# Patient Record
Sex: MALE | Race: WHITE | NOT HISPANIC OR LATINO | Employment: FULL TIME | URBAN - METROPOLITAN AREA
[De-identification: names, ages, dates, MRNs, and addresses within clinical notes are randomized per-mention and may not be internally consistent; named-entity substitution may affect disease eponyms.]

---

## 2017-01-03 ENCOUNTER — ALLSCRIPTS OFFICE VISIT (OUTPATIENT)
Dept: OTHER | Facility: OTHER | Age: 47
End: 2017-01-03

## 2017-03-07 ENCOUNTER — ALLSCRIPTS OFFICE VISIT (OUTPATIENT)
Dept: OTHER | Facility: OTHER | Age: 47
End: 2017-03-07

## 2017-03-07 DIAGNOSIS — E29.1 TESTICULAR HYPOFUNCTION: ICD-10-CM

## 2017-03-07 DIAGNOSIS — R68.82 DECREASED LIBIDO: ICD-10-CM

## 2017-03-27 ENCOUNTER — TRANSCRIBE ORDERS (OUTPATIENT)
Dept: ADMINISTRATIVE | Facility: HOSPITAL | Age: 47
End: 2017-03-27

## 2017-03-27 DIAGNOSIS — R68.82 DECREASED LIBIDO: Primary | ICD-10-CM

## 2017-04-03 DIAGNOSIS — E29.1 TESTICULAR HYPOFUNCTION: ICD-10-CM

## 2017-04-05 ENCOUNTER — TRANSCRIBE ORDERS (OUTPATIENT)
Dept: ADMINISTRATIVE | Facility: HOSPITAL | Age: 47
End: 2017-04-05

## 2017-04-05 DIAGNOSIS — G47.33 OBSTRUCTIVE SLEEP APNEA (ADULT) (PEDIATRIC): Primary | ICD-10-CM

## 2017-04-24 ENCOUNTER — LAB CONVERSION - ENCOUNTER (OUTPATIENT)
Dept: OTHER | Facility: OTHER | Age: 47
End: 2017-04-24

## 2017-04-24 LAB
25(OH)D3 SERPL-MCNC: 42 NG/ML (ref 30–100)
FERRITIN SERPL-MCNC: 266 NG/ML (ref 20–380)
PROLACTIN (HISTORICAL): 4.1 NG/ML (ref 2–18)

## 2017-04-25 ENCOUNTER — HOSPITAL ENCOUNTER (OUTPATIENT)
Dept: SLEEP CENTER | Facility: CLINIC | Age: 47
Discharge: HOME | End: 2017-04-25
Payer: COMMERCIAL

## 2017-04-25 DIAGNOSIS — G47.33 OBSTRUCTIVE SLEEP APNEA (ADULT) (PEDIATRIC): ICD-10-CM

## 2017-04-25 PROCEDURE — 95810 POLYSOM 6/> YRS 4/> PARAM: CPT

## 2017-05-01 ENCOUNTER — GENERIC CONVERSION - ENCOUNTER (OUTPATIENT)
Dept: OTHER | Facility: OTHER | Age: 47
End: 2017-05-01

## 2017-06-02 ENCOUNTER — ALLSCRIPTS OFFICE VISIT (OUTPATIENT)
Dept: OTHER | Facility: OTHER | Age: 47
End: 2017-06-02

## 2017-06-02 DIAGNOSIS — R73.01 IMPAIRED FASTING GLUCOSE: ICD-10-CM

## 2017-06-02 DIAGNOSIS — E04.1 NONTOXIC SINGLE THYROID NODULE: ICD-10-CM

## 2017-06-04 ENCOUNTER — HOSPITAL ENCOUNTER (OUTPATIENT)
Dept: ULTRASOUND IMAGING | Facility: HOSPITAL | Age: 47
Discharge: HOME | End: 2017-06-04
Attending: INTERNAL MEDICINE
Payer: COMMERCIAL

## 2017-06-04 DIAGNOSIS — E04.1 NONTOXIC SINGLE THYROID NODULE: ICD-10-CM

## 2017-06-04 PROCEDURE — 76536 US EXAM OF HEAD AND NECK: CPT

## 2017-06-06 ENCOUNTER — GENERIC CONVERSION - ENCOUNTER (OUTPATIENT)
Dept: OTHER | Facility: OTHER | Age: 47
End: 2017-06-06

## 2017-06-08 ENCOUNTER — TRANSCRIBE ORDERS (OUTPATIENT)
Dept: ADMINISTRATIVE | Facility: HOSPITAL | Age: 47
End: 2017-06-08

## 2017-06-08 DIAGNOSIS — E04.1 NONTOXIC UNINODULAR GOITER: Primary | ICD-10-CM

## 2017-06-17 ENCOUNTER — LAB CONVERSION - ENCOUNTER (OUTPATIENT)
Dept: OTHER | Facility: OTHER | Age: 47
End: 2017-06-17

## 2017-06-17 LAB
A/G RATIO (HISTORICAL): 2.3 (CALC) (ref 1–2.5)
ALBUMIN SERPL BCP-MCNC: 4.3 G/DL (ref 3.6–5.1)
ALP SERPL-CCNC: 34 U/L (ref 40–115)
ALT SERPL W P-5'-P-CCNC: 16 U/L (ref 9–46)
AST SERPL W P-5'-P-CCNC: 12 U/L (ref 10–40)
BILIRUB SERPL-MCNC: 0.7 MG/DL (ref 0.2–1.2)
BUN SERPL-MCNC: 14 MG/DL (ref 7–25)
BUN/CREA RATIO (HISTORICAL): ABNORMAL (CALC) (ref 6–22)
CALCIUM SERPL-MCNC: 9.3 MG/DL (ref 8.6–10.3)
CHLORIDE SERPL-SCNC: 107 MMOL/L (ref 98–110)
CO2 SERPL-SCNC: 28 MMOL/L (ref 20–31)
CREAT SERPL-MCNC: 0.86 MG/DL (ref 0.6–1.35)
EGFR AFRICAN AMERICAN (HISTORICAL): 121 ML/MIN/1.73M2
EGFR-AMERICAN CALC (HISTORICAL): 104 ML/MIN/1.73M2
GAMMA GLOBULIN (HISTORICAL): 1.9 G/DL (CALC) (ref 1.9–3.7)
GLUCOSE (HISTORICAL): 94 MG/DL (ref 65–99)
HBA1C MFR BLD HPLC: 4.9 % OF TOTAL HGB
POTASSIUM SERPL-SCNC: 4.4 MMOL/L (ref 3.5–5.3)
SODIUM SERPL-SCNC: 141 MMOL/L (ref 135–146)
T4 FREE SERPL-MCNC: 1.3 NG/DL (ref 0.8–1.8)
TOTAL PROTEIN (HISTORICAL): 6.2 G/DL (ref 6.1–8.1)
TSH SERPL DL<=0.05 MIU/L-ACNC: 1.35 MIU/L (ref 0.4–4.5)

## 2017-06-19 ENCOUNTER — HOSPITAL ENCOUNTER (OUTPATIENT)
Dept: RADIOLOGY | Facility: HOSPITAL | Age: 47
Discharge: HOME | End: 2017-06-19
Attending: INTERNAL MEDICINE
Payer: COMMERCIAL

## 2017-06-19 DIAGNOSIS — E04.1 NONTOXIC UNINODULAR GOITER: ICD-10-CM

## 2017-06-19 PROCEDURE — 88173 CYTOPATH EVAL FNA REPORT: CPT | Performed by: INTERNAL MEDICINE

## 2017-06-19 PROCEDURE — 88172 CYTP DX EVAL FNA 1ST EA SITE: CPT | Performed by: INTERNAL MEDICINE

## 2017-06-19 PROCEDURE — 10022 HB FNA W/IMAGE: CPT

## 2017-06-19 PROCEDURE — 76942 ECHO GUIDE FOR BIOPSY: CPT

## 2017-06-19 RX ORDER — LIDOCAINE HYDROCHLORIDE 10 MG/ML
4 INJECTION, SOLUTION INFILTRATION; PERINEURAL ONCE
Status: COMPLETED | OUTPATIENT
Start: 2017-06-19 | End: 2017-06-19

## 2017-06-19 RX ADMIN — LIDOCAINE HYDROCHLORIDE 4 ML: 10 INJECTION, SOLUTION INFILTRATION; PERINEURAL at 09:24

## 2017-06-20 ENCOUNTER — GENERIC CONVERSION - ENCOUNTER (OUTPATIENT)
Dept: OTHER | Facility: OTHER | Age: 47
End: 2017-06-20

## 2017-06-27 ENCOUNTER — ALLSCRIPTS OFFICE VISIT (OUTPATIENT)
Dept: OTHER | Facility: OTHER | Age: 47
End: 2017-06-27

## 2017-07-14 ENCOUNTER — ALLSCRIPTS OFFICE VISIT (OUTPATIENT)
Dept: OTHER | Facility: OTHER | Age: 47
End: 2017-07-14

## 2017-07-17 ENCOUNTER — GENERIC CONVERSION - ENCOUNTER (OUTPATIENT)
Dept: OTHER | Facility: OTHER | Age: 47
End: 2017-07-17

## 2017-07-31 DIAGNOSIS — E29.1 TESTICULAR HYPOFUNCTION: ICD-10-CM

## 2017-08-01 ENCOUNTER — GENERIC CONVERSION - ENCOUNTER (OUTPATIENT)
Dept: OTHER | Facility: OTHER | Age: 47
End: 2017-08-01

## 2017-08-02 ENCOUNTER — LAB CONVERSION - ENCOUNTER (OUTPATIENT)
Dept: OTHER | Facility: OTHER | Age: 47
End: 2017-08-02

## 2017-08-02 LAB
TESTOSTERONE FREE (HISTORICAL): 98.4 PG/ML (ref 35–155)
TESTOSTERONE TOTAL (HISTORICAL): 734 NG/DL (ref 250–1100)

## 2017-08-03 ENCOUNTER — ALLSCRIPTS OFFICE VISIT (OUTPATIENT)
Dept: OTHER | Facility: OTHER | Age: 47
End: 2017-08-03

## 2017-08-06 ENCOUNTER — GENERIC CONVERSION - ENCOUNTER (OUTPATIENT)
Dept: OTHER | Facility: OTHER | Age: 47
End: 2017-08-06

## 2017-08-31 ENCOUNTER — GENERIC CONVERSION - ENCOUNTER (OUTPATIENT)
Dept: OTHER | Facility: OTHER | Age: 47
End: 2017-08-31

## 2017-09-12 ENCOUNTER — GENERIC CONVERSION - ENCOUNTER (OUTPATIENT)
Dept: OTHER | Facility: OTHER | Age: 47
End: 2017-09-12

## 2017-10-12 ENCOUNTER — GENERIC CONVERSION - ENCOUNTER (OUTPATIENT)
Dept: OTHER | Facility: OTHER | Age: 47
End: 2017-10-12

## 2017-11-28 ENCOUNTER — GENERIC CONVERSION - ENCOUNTER (OUTPATIENT)
Dept: FAMILY MEDICINE CLINIC | Facility: CLINIC | Age: 47
End: 2017-11-28

## 2017-12-05 ENCOUNTER — GENERIC CONVERSION - ENCOUNTER (OUTPATIENT)
Dept: FAMILY MEDICINE CLINIC | Facility: CLINIC | Age: 47
End: 2017-12-05

## 2017-12-22 ENCOUNTER — GENERIC CONVERSION - ENCOUNTER (OUTPATIENT)
Dept: OTHER | Facility: OTHER | Age: 47
End: 2017-12-22

## 2017-12-22 ENCOUNTER — GENERIC CONVERSION - ENCOUNTER (OUTPATIENT)
Dept: ENDOCRINOLOGY | Facility: CLINIC | Age: 47
End: 2017-12-22

## 2018-01-02 ENCOUNTER — GENERIC CONVERSION - ENCOUNTER (OUTPATIENT)
Dept: FAMILY MEDICINE CLINIC | Facility: CLINIC | Age: 48
End: 2018-01-02

## 2018-01-10 NOTE — RESULT NOTES
Discussion/Summary   Needs ultrasound-guided fine needle aspirate of the right thyroid nodule  Verified Results  US THYROID [id number] 02:42PM Bharti Fulton Order Number: [id number]    - Patient Instructions: To schedule this appointment, please contact Central Scheduling at [phone]  Test Name Result Flag Reference   US THYROID (Report)     THYROID ULTRASOUND     INDICATION: Palpable nodule     COMPARISON: None  TECHNIQUE:  Ultrasound of the thyroid was performed with a high frequency linear transducer in transverse and sagittal planes including volumetric imaging sweeps as well as traditional still imaging technique  FINDINGS:   Normal homogeneous smooth echotexture  Right gland: 1 8 x 2 0 x 4 7 cm  Right lower pole nodule measuring 2 0 x 2 2 x 2 5 cm  Solid hypoechoic with microcalcification and infiltrative margins with extension to the capsule  No priors for comparison  HIGH SUSPICION PATTERN (estimated risk of malignancy > 70-90%) FNA    recommended at >/= 1 0 cm  Left gland: 1 1 x 1 3 x 3 7 cm  No dominant nodules  Isthmus: The isthmus is 0 3 cm in AP dimension  IMPRESSION:      Right thyroid nodule, highly suspicious for malignancy  Sampling recommended       ##cfslh   I personally discussed this result with Dylan Cox on 6/6/2017 10:03 AM    ##               Workstation performed: [id number]     Signed by:   Devin Tubbs MD   6/6/17

## 2018-01-12 VITALS
HEART RATE: 66 BPM | DIASTOLIC BLOOD PRESSURE: 80 MMHG | WEIGHT: 204 LBS | SYSTOLIC BLOOD PRESSURE: 120 MMHG | HEIGHT: 67 IN | BODY MASS INDEX: 32.02 KG/M2

## 2018-01-13 VITALS
HEIGHT: 67 IN | SYSTOLIC BLOOD PRESSURE: 118 MMHG | HEART RATE: 92 BPM | BODY MASS INDEX: 31.58 KG/M2 | DIASTOLIC BLOOD PRESSURE: 84 MMHG | WEIGHT: 201.19 LBS

## 2018-01-13 VITALS
HEIGHT: 67 IN | BODY MASS INDEX: 31.57 KG/M2 | WEIGHT: 201.13 LBS | DIASTOLIC BLOOD PRESSURE: 90 MMHG | HEART RATE: 84 BPM | SYSTOLIC BLOOD PRESSURE: 130 MMHG

## 2018-01-13 VITALS
DIASTOLIC BLOOD PRESSURE: 86 MMHG | WEIGHT: 203.13 LBS | HEART RATE: 76 BPM | BODY MASS INDEX: 31.81 KG/M2 | SYSTOLIC BLOOD PRESSURE: 140 MMHG

## 2018-01-14 VITALS
WEIGHT: 196.5 LBS | SYSTOLIC BLOOD PRESSURE: 132 MMHG | BODY MASS INDEX: 30.84 KG/M2 | HEART RATE: 84 BPM | HEIGHT: 67 IN | DIASTOLIC BLOOD PRESSURE: 82 MMHG

## 2018-01-15 VITALS
HEIGHT: 67 IN | WEIGHT: 200 LBS | TEMPERATURE: 97.7 F | BODY MASS INDEX: 31.39 KG/M2 | RESPIRATION RATE: 16 BRPM | SYSTOLIC BLOOD PRESSURE: 136 MMHG | DIASTOLIC BLOOD PRESSURE: 84 MMHG | OXYGEN SATURATION: 98 % | HEART RATE: 96 BPM

## 2018-01-16 NOTE — RESULT NOTES
Discussion/Summary   Normal laboratory testing     Verified Results  US GUIDED THYROID BIOPSY [id number] 08:59AM Sherron Villa     Test Name Result Flag Reference   US GUIDED THYROID BIOPSY (Report)     ULTRASOUND-GUIDED THYROID BIOPSY     HISTORY: 56year-old male with a history of a right-sided thyroid nodule  COMPARISON: Thyroid ultrasound from June 4, 2017  FINDINGS:      Prior ultrasound images were reviewed  On prior thyroid ultrasound from June 4, 2017, there was a right lower pole thyroid nodule measuring 2 0 x 2 2 x 2 5 cm that met criteria for FNA  On today's limited thyroid ultrasound, the right lower pole thyroid nodule measured 2 5 x 2 0 x 2 1 cm  The procedure was explained to the patient, including risks of hemorrhage, infection and local injury  Informed consent was freely obtained  The patient verbalized expressed understanding of the above risks and wished to proceed with the procedure  Final standard time-out procedure performed  PROCEDURE: The neck was prepped and draped in normal sterile fashion  Under real-time ultrasound guidance and local anesthesia 3 passes with a 25 gauge needle were made through the right lower pole thyroid nodule  Cytopathology was present and deemed    the specimens adequate for evaluation  The patient tolerated the procedure well  There was no evidence of post biopsy hematoma formation on repeat ultrasound evaluation  Postprocedure instructions were provided for the patient  I asked the patient to    call us with any questions, concerns, or acute problems  The patient expressed understanding of the above  IMPRESSION:     Status post successful ultrasound-guided thyroid biopsy of the right lower pole thyroid nodule previously measuring 2 0 x 2 2 x 2 5 cm  There was no evidence of post biopsy hematoma on repeat ultrasound evaluation  I reviewed the above findings and procedure with Dr Pia Santillan        Procedure was performed by Nan Wilder [place] PA-C under the direct supervision of Dr Yenni Jack  PERFORMED, DICTATED AND SIGNED BY: Thalia Feng       Workstation performed: [id number]     Signed by:   Laura Cordova MD   6/19/17     (1) COMPREHENSIVE METABOLIC PANEL [id number] 14:27ZO Percell Citrin     Test Name Result Flag Reference   GLUCOSE 94 mg/dL  65-99   Fasting reference interval   UREA NITROGEN (BUN) 14 mg/dL  7-25   CREATININE 0 86 mg/dL  0 60-1 35   eGFR NON-AFR  AMERICAN 104 mL/min/1 73m2  > OR = 60   eGFR AFRICAN AMERICAN 121 mL/min/1 73m2  > OR = 60   BUN/CREATININE RATIO   3-17   NOT APPLICABLE (calc)   SODIUM 141 mmol/L  135-146   POTASSIUM 4 4 mmol/L  3 5-5 3   CHLORIDE 107 mmol/L     CARBON DIOXIDE 28 mmol/L  20-31   CALCIUM 9 3 mg/dL  8 6-10 3   PROTEIN, TOTAL 6 2 g/dL  6 1-8 1   ALBUMIN 4 3 g/dL  3 6-5 1   GLOBULIN 1 9 g/dL (calc)  1 9-3 7   ALBUMIN/GLOBULIN RATIO 2 3 (calc)  1 0-2 5   BILIRUBIN, TOTAL 0 7 mg/dL  0 2-1 2   ALKALINE PHOSPHATASE 34 U/L L    AST 12 U/L  10-40   ALT 16 U/L  9-46     (1) T4, FREE [id number] 06:32AM Daisy, Bankim     Test Name Result Flag Reference   T4, FREE 1 3 ng/dL  0 8-1 8     (Q) TSH, 3RD GENERATION [id number] 06:32AM Daisy, Bankim     Test Name Result Flag Reference   TSH 1 35 mIU/L  0 40-4 50     (Q) HEMOGLOBIN A1c [id number] 06:32AM Daisy, Bankim   REPORT COMMENT:  FASTING:YES     Test Name Result Flag Reference   HEMOGLOBIN A1c 4 9 % of total Hgb  <5 7   For the purpose of screening for the presence of  diabetes:     <5 7%       Consistent with the absence of diabetes  5 7-6 4%    Consistent with increased risk for diabetes              (prediabetes)  > or =6 5%  Consistent with diabetes     This assay result is consistent with a decreased risk  of diabetes  Currently, no consensus exists regarding use of  hemoglobin A1c for diagnosis of diabetes in children       According to American Diabetes Association (ADA)  guidelines, hemoglobin A1c <7 0% represents optimal  control in non-pregnant diabetic patients  Different  metrics may apply to specific patient populations  Standards of Medical Care in Diabetes(ADA)  Plan  Right thyroid nodule    · Biopsy Thyroid FNA Using Ultrasound Guidance, [id number]; Status:Active;  Requested  EZS:[id number];

## 2018-01-16 NOTE — RESULT NOTES
Discussion/Summary   Normal     Verified Results  (1) OP ALEKSANDRA FERRITIN 21Apr2017 09:33AM Ivon Shearer     Test Name Result Flag Reference   FERRITIN 266 ng/mL       *(Q) VITAMIN D, 25-HYDROXY, LC/MS/MS 21Apr2017 09:33AM Sherron Villa   REPORT COMMENT:  FASTING:YES     Test Name Result Flag Reference   VITAMIN D, 25-OH, TOTAL 42 ng/mL     Vitamin D Status         25-OH Vitamin D:     Deficiency:                    <20 ng/mL  Insufficiency:             20 - 29 ng/mL  Optimal:                 > or = 30 ng/mL     For 25-OH Vitamin D testing on patients on   D2-supplementation and patients for whom quantitation   of D2 and D3 fractions is required, the QuestAssureD(TM)  25-OH VIT D, (D2,D3), LC/MS/MS is recommended: order   code 81998 (patients >2yrs)  For more information on this test, go to:  http://education  [employer]/faq/RDD601  (This link is being provided for   informational/educational purposes only )

## 2018-02-12 DIAGNOSIS — E23.0 HYPOPITUITARISM (HCC): ICD-10-CM

## 2018-02-12 DIAGNOSIS — R73.01 IMPAIRED FASTING GLUCOSE: ICD-10-CM

## 2018-02-12 DIAGNOSIS — E29.1 TESTICULAR HYPOFUNCTION: ICD-10-CM

## 2018-02-14 LAB
ALBUMIN SERPL-MCNC: 4.3 G/DL (ref 3.6–5.1)
ALBUMIN/GLOB SERPL: 2.2 (CALC) (ref 1–2.5)
ALP SERPL-CCNC: 36 U/L (ref 40–115)
ALT SERPL-CCNC: 17 U/L (ref 9–46)
AST SERPL-CCNC: 14 U/L (ref 10–40)
BILIRUB SERPL-MCNC: 0.8 MG/DL (ref 0.2–1.2)
BUN SERPL-MCNC: 16 MG/DL (ref 7–25)
BUN/CREAT SERPL: ABNORMAL (CALC) (ref 6–22)
CALCIUM SERPL-MCNC: 9.1 MG/DL (ref 8.6–10.3)
CHLORIDE SERPL-SCNC: 108 MMOL/L (ref 98–110)
CO2 SERPL-SCNC: 28 MMOL/L (ref 20–31)
CREAT SERPL-MCNC: 0.84 MG/DL (ref 0.6–1.35)
GLOBULIN SER CALC-MCNC: 2 G/DL (CALC) (ref 1.9–3.7)
GLUCOSE SERPL-MCNC: 106 MG/DL (ref 65–99)
HCT VFR BLD AUTO: 41.6 % (ref 38.5–50)
HGB BLD-MCNC: 14.4 G/DL (ref 13.2–17.1)
POTASSIUM SERPL-SCNC: 4.2 MMOL/L (ref 3.5–5.3)
PROT SERPL-MCNC: 6.3 G/DL (ref 6.1–8.1)
PSA SERPL-MCNC: 0.8 NG/ML
SL AMB EGFR AFRICAN AMERICAN: 122 ML/MIN/1.73M2
SL AMB EGFR NON AFRICAN AMERICAN: 105 ML/MIN/1.73M2
SODIUM SERPL-SCNC: 142 MMOL/L (ref 135–146)
TESTOST FREE SERPL-MCNC: 105.5 PG/ML (ref 35–155)
TESTOST SERPL-MCNC: 668 NG/DL (ref 250–1100)

## 2018-02-15 NOTE — PROGRESS NOTES
Please call the patient regarding his abnormal result  Most of the laboratory testing is normal except for slightly elevated blood sugar  Please send him a slip for hemoglobin A1c to be done with his next blood draw

## 2018-02-19 ENCOUNTER — TELEPHONE (OUTPATIENT)
Dept: ENDOCRINOLOGY | Facility: CLINIC | Age: 48
End: 2018-02-19

## 2018-02-19 DIAGNOSIS — E23.0 HYPOGONADOTROPIC HYPOGONADISM (HCC): Primary | ICD-10-CM

## 2018-02-19 NOTE — TELEPHONE ENCOUNTER
----- Message from Nataliya Lam MD sent at 2/15/2018  2:09 PM EST -----  Please call the patient regarding his abnormal result  Most of the laboratory testing is normal except for slightly elevated blood sugar  Please send him a slip for hemoglobin A1c to be done with his next blood draw

## 2018-02-21 LAB — HBA1C MFR BLD: 4.8 % OF TOTAL HGB

## 2018-02-21 RX ORDER — MULTIVIT-MIN/IRON/FOLIC ACID/K 18-600-40
1 CAPSULE ORAL DAILY
COMMUNITY
Start: 2017-03-07

## 2018-02-21 RX ORDER — GABAPENTIN 300 MG/1
CAPSULE ORAL
COMMUNITY
End: 2018-04-27

## 2018-02-21 RX ORDER — LEVOTHYROXINE SODIUM 137 UG/1
150 TABLET ORAL DAILY
COMMUNITY
End: 2018-04-27

## 2018-02-22 ENCOUNTER — TELEPHONE (OUTPATIENT)
Dept: ENDOCRINOLOGY | Facility: CLINIC | Age: 48
End: 2018-02-22

## 2018-02-22 NOTE — TELEPHONE ENCOUNTER
----- Message from Josee Almonte MD sent at 2/22/2018  2:09 PM EST -----  The laboratory testing results are normal     Has appointment tomorrow

## 2018-02-23 ENCOUNTER — OFFICE VISIT (OUTPATIENT)
Dept: ENDOCRINOLOGY | Facility: CLINIC | Age: 48
End: 2018-02-23
Payer: COMMERCIAL

## 2018-02-23 VITALS
HEART RATE: 91 BPM | HEIGHT: 67 IN | SYSTOLIC BLOOD PRESSURE: 124 MMHG | DIASTOLIC BLOOD PRESSURE: 62 MMHG | BODY MASS INDEX: 29.9 KG/M2 | WEIGHT: 190.5 LBS

## 2018-02-23 DIAGNOSIS — E89.0 POSTOPERATIVE HYPOTHYROIDISM: ICD-10-CM

## 2018-02-23 DIAGNOSIS — C73 PAPILLARY THYROID CARCINOMA (HCC): ICD-10-CM

## 2018-02-23 DIAGNOSIS — E23.0 HYPOGONADOTROPIC HYPOGONADISM (HCC): Primary | ICD-10-CM

## 2018-02-23 DIAGNOSIS — F52.21 ERECTILE DYSFUNCTION OF NON-ORGANIC ORIGIN: ICD-10-CM

## 2018-02-23 PROCEDURE — 99214 OFFICE O/P EST MOD 30 MIN: CPT | Performed by: INTERNAL MEDICINE

## 2018-02-23 NOTE — ASSESSMENT & PLAN NOTE
He continues on coma fan every other day  His testosterone levels are in the normal range  Continue current therapy  Recheck total and free testosterone and PSA prior to the next visit

## 2018-02-23 NOTE — PROGRESS NOTES
Assessment/Plan:    Hypogonadotropic hypogonadism ([facility] [address] )  He continues on coma fan every other day  His testosterone levels are in the normal range  Continue current therapy  Recheck total and free testosterone and PSA prior to the next visit  Papillary thyroid carcinoma [facility])  He is seeing physicians at [facility]  Postoperative hypothyroidism  The TSH is in the low-normal range  Management per [facility]  Erectile dysfunction of non-organic origin  This has improved with increase in testosterone  Diagnoses and all orders for this visit:    Hypogonadotropic hypogonadism ([facility] [address] )  -     Testosterone, free, total Lab Collect; Future  -     PSA Lab Collect; Future    Erectile dysfunction of non-organic origin    Papillary thyroid carcinoma (HCC)    Postoperative hypothyroidism    Other orders  -     clomiPHENE (CLOMID) 50 mg tablet; Take 0 5 tablets by mouth daily    -     Omega-3 Fatty Acids (FISH OIL) 645 MG CAPS; Take by mouth  -     gabapentin (NEURONTIN) 300 mg capsule; Take by mouth  -     levothyroxine 137 mcg tablet; Take 150 tablets by mouth daily    -     Cholecalciferol (VITAMIN D) 2000 units CAPS; Take 1 capsule by mouth daily          Subjective:      Patient ID: Lucy Gauthier is a 56 y o  male  60-year-old male with hypogonadotropic hypogonadism for which she is on clomiphene  He denies any difficulty with urination  He has papillary thyroid cancer for which she had total thyroidectomy in July of 2017  He has not had radioactive iodine therapy  His most recent thyroglobulin level was undetectable with negative thyroglobulin antibodies  For the postoperative hypothyroidism, he is on levothyroxine  He denies any symptoms of hypo or hyperthyroidism  Erectile dysfunction has improved          The following portions of the patient's history were reviewed and updated as appropriate: allergies, current medications, past family history, past medical history, past social history, past surgical history and problem list     Review of Systems   Constitutional: Negative for chills and fever  Respiratory: Negative for shortness of breath  Cardiovascular: Negative for chest pain  Gastrointestinal: Negative for constipation, diarrhea, nausea and vomiting  Endocrine: Negative for cold intolerance and heat intolerance  All other systems reviewed and are negative  Objective:      /62   Pulse 91   Ht 5' 7" (1 702 m)   Wt 86 4 kg (190 lb 8 oz)   BMI 29 84 kg/m²          Physical Exam   Constitutional: He is oriented to person, place, and time  He appears well-developed and well-nourished  No distress  HENT:   Head: Normocephalic and atraumatic  Mouth/Throat: Oropharynx is clear and moist and mucous membranes are normal  No oropharyngeal exudate  Eyes: Conjunctivae, EOM and lids are normal  Right eye exhibits no discharge  Left eye exhibits no discharge  No scleral icterus  Neck: Neck supple  The thyroid is absent  Cardiovascular: Normal rate, regular rhythm and normal heart sounds  Exam reveals no gallop and no friction rub  No murmur heard  Pulmonary/Chest: Effort normal and breath sounds normal  No respiratory distress  He has no wheezes  Abdominal: Soft  Bowel sounds are normal  He exhibits no distension  There is no tenderness  Musculoskeletal: Normal range of motion  He exhibits no edema, tenderness or deformity  Lymphadenopathy:        Head (right side): No occipital adenopathy present  Head (left side): No occipital adenopathy present  Right: No supraclavicular adenopathy present  Left: No supraclavicular adenopathy present  Neurological: He is alert and oriented to person, place, and time  No cranial nerve deficit  Coordination normal    Skin: Skin is warm and intact  No rash noted  He is not diaphoretic  No erythema  Psychiatric: He has a normal mood and affect  Vitals reviewed

## 2018-04-27 ENCOUNTER — OFFICE VISIT (OUTPATIENT)
Dept: FAMILY MEDICINE CLINIC | Facility: CLINIC | Age: 48
End: 2018-04-27
Payer: COMMERCIAL

## 2018-04-27 VITALS
BODY MASS INDEX: 27.94 KG/M2 | SYSTOLIC BLOOD PRESSURE: 124 MMHG | TEMPERATURE: 98.1 F | WEIGHT: 178 LBS | DIASTOLIC BLOOD PRESSURE: 68 MMHG | RESPIRATION RATE: 16 BRPM | HEIGHT: 67 IN

## 2018-04-27 DIAGNOSIS — E89.0 POSTOPERATIVE HYPOTHYROIDISM: Primary | ICD-10-CM

## 2018-04-27 DIAGNOSIS — E23.0 HYPOGONADOTROPIC HYPOGONADISM (HCC): ICD-10-CM

## 2018-04-27 DIAGNOSIS — R10.32 GROIN DISCOMFORT, LEFT: ICD-10-CM

## 2018-04-27 DIAGNOSIS — E78.00 HYPERCHOLESTEROLEMIA: ICD-10-CM

## 2018-04-27 DIAGNOSIS — M79.2 NEURALGIA OF LEFT INGUINAL REGION: ICD-10-CM

## 2018-04-27 PROCEDURE — 1036F TOBACCO NON-USER: CPT | Performed by: FAMILY MEDICINE

## 2018-04-27 PROCEDURE — 3008F BODY MASS INDEX DOCD: CPT | Performed by: FAMILY MEDICINE

## 2018-04-27 PROCEDURE — 99214 OFFICE O/P EST MOD 30 MIN: CPT | Performed by: FAMILY MEDICINE

## 2018-04-27 RX ORDER — LEVOTHYROXINE SODIUM 0.15 MG/1
TABLET ORAL
COMMUNITY
Start: 2018-03-06 | End: 2019-08-27

## 2018-04-27 RX ORDER — MULTIVIT WITH MINERALS/LUTEIN
1000 TABLET ORAL DAILY
COMMUNITY

## 2018-04-27 NOTE — ASSESSMENT & PLAN NOTE
Patient remains on clomiphene  Check free and total testosterone level    Will need to have conversation with endocrinologist in regards to management and presc

## 2018-04-27 NOTE — PROGRESS NOTES
Subjective:      Patient ID: Jackelin Degroot is a 53 y o  male  Patient presents primarily complaining of a fullness in numbness in the left inguinal region  Patient states that approximately 2 weeks ago he was doing sit-ups when he felt a sharp pain  He stop doing any lifting and sit-ups  He still has discomfort in the left inguinal region as well as a numbness  In the left groin that extends across his abdomen  No difficulty with bowel movements  Patient does have history of left inguinal hernia repair with mesh approximately 10 years ago on the same side  Patient also has a very extensive history of having been diagnosed with papillary carcinoma of the thyroid which was noted by endocrinologist when patient was being seen for testosterone deficiency  Patient had partial thyroidectomy at [facility] and is following up with endocrinologist at [facility]  Patient has been on clomiphene as prescribed by endocrinologist to increase his testosterone level  He would like to have testosterone levels repeated  Endocrinologist have mentioned having clomiphene refilled here at the primary care office  Patient otherwise feels well  He will be going for screening for his employer  He has lost a significant amount of weight since having surgery performed  He also had spine surgery including micro diskectomy and decompression of his right shoulder by physicians at [facility] [employer]        No past medical history on file  No family history on file  No past surgical history on file  reports that he has never smoked  He has never used smokeless tobacco  He reports that he drinks alcohol  He reports that he does not use drugs        Current Outpatient Prescriptions:     Ascorbic Acid (VITAMIN C) 1000 MG tablet, Take 1,000 mg by mouth daily, Disp: , Rfl:     Cholecalciferol (VITAMIN D) 2000 units CAPS, Take 1 capsule by mouth daily, Disp: , Rfl:    Cholecalciferol 1000 units capsule, Take by mouth, Disp: , Rfl:     clomiPHENE (CLOMID) 50 mg tablet, Take 0 5 tablets by mouth daily  , Disp: , Rfl:     levothyroxine 150 mcg tablet, , Disp: , Rfl:     Magnesium 100 MG TABS, Take by mouth, Disp: , Rfl:     Omega-3 Fatty Acids (FISH OIL) 645 MG CAPS, Take by mouth, Disp: , Rfl:     Potassium 99 MG TABS, Take by mouth, Disp: , Rfl:     The following portions of the patient's history were reviewed and updated as appropriate: allergies, current medications, past family history, past medical history, past social history, past surgical history and problem list     Review of Systems   Constitutional: Negative for activity change and unexpected weight change ( weight loss)  HENT: Negative  Respiratory: Negative  Cardiovascular: Negative  Gastrointestinal: Negative  Genitourinary:        Inguinal discomfort in the left inguinal region   Musculoskeletal: Negative  Objective:    /68   Temp 98 1 °F (36 7 °C) (Tympanic)   Resp 16   Ht 5' 7" (1 702 m)   Wt 80 7 kg (178 lb)   BMI 27 88 kg/m²      Physical Exam   Constitutional: He appears well-developed and well-nourished  Neck: Normal range of motion  Neck supple  Cardiovascular: Normal rate, regular rhythm and normal heart sounds  Abdominal: Soft  Bowel sounds are normal  There is no rebound  A hernia is present  Hernia confirmed positive in the left inguinal area ( questionable hernia)  Hernia confirmed negative in the right inguinal area  Genitourinary:       Right testis shows no mass, no swelling and no tenderness  Left testis shows no mass, no swelling and no tenderness  Lymphadenopathy:        Right: No inguinal adenopathy present  Left: No inguinal adenopathy present  No results found for this or any previous visit (from the past 1008 hour(s))  Assessment/Plan:    No problem-specific Assessment & Plan notes found for this encounter             Problem List Items Addressed This Visit     Hypogonadotropic hypogonadism [facility])      Patient remains on clomiphene  Check free and total testosterone level  Will need to have conversation with endocrinologist in regards to management and presc         Relevant Orders    Testosterone, free, total    Postoperative hypothyroidism - Primary      Management as per endocrinologist at [facility] and oncologic surgeon         Relevant Medications    levothyroxine 150 mcg tablet    Other Relevant Orders    CBC and differential    Vitamin D 25 hydroxy    Groin discomfort, left    Relevant Orders    US groin/inguinal area    Hypercholesterolemia      Patient commended on his recent weight loss  Check lipid profile         Relevant Orders    Comprehensive metabolic panel    Neuralgia of left inguinal region      Patient may have developed a hernia or even door mesh  Check left inguinal ultrasound              Follow-up will be based upon results of ultrasound and laboratory testing

## 2018-04-28 ENCOUNTER — HOSPITAL ENCOUNTER (OUTPATIENT)
Dept: ULTRASOUND IMAGING | Facility: HOSPITAL | Age: 48
Discharge: HOME | End: 2018-04-28
Payer: COMMERCIAL

## 2018-04-28 DIAGNOSIS — R10.32 GROIN DISCOMFORT, LEFT: ICD-10-CM

## 2018-04-28 PROCEDURE — 76705 ECHO EXAM OF ABDOMEN: CPT

## 2018-05-03 NOTE — PROGRESS NOTES
Please call patient and notify that results are normal   No evidence of hernia or fluid collection  In all likelihood he probably has a muscular strain

## 2018-05-09 LAB
25(OH)D3 SERPL-MCNC: 77 NG/ML (ref 30–100)
ALBUMIN SERPL-MCNC: 4.4 G/DL (ref 3.6–5.1)
ALBUMIN/GLOB SERPL: 2.2 (CALC) (ref 1–2.5)
ALP SERPL-CCNC: 36 U/L (ref 40–115)
ALT SERPL-CCNC: 20 U/L (ref 9–46)
AST SERPL-CCNC: 18 U/L (ref 10–40)
BASOPHILS # BLD AUTO: 28 CELLS/UL (ref 0–200)
BASOPHILS NFR BLD AUTO: 0.6 %
BILIRUB SERPL-MCNC: 0.4 MG/DL (ref 0.2–1.2)
BUN SERPL-MCNC: 16 MG/DL (ref 7–25)
BUN/CREAT SERPL: ABNORMAL (CALC) (ref 6–22)
CALCIUM SERPL-MCNC: 9.3 MG/DL (ref 8.6–10.3)
CHLORIDE SERPL-SCNC: 106 MMOL/L (ref 98–110)
CO2 SERPL-SCNC: 25 MMOL/L (ref 20–31)
CREAT SERPL-MCNC: 0.97 MG/DL (ref 0.6–1.35)
EOSINOPHIL # BLD AUTO: 189 CELLS/UL (ref 15–500)
EOSINOPHIL NFR BLD AUTO: 4.1 %
ERYTHROCYTE [DISTWIDTH] IN BLOOD BY AUTOMATED COUNT: 12.8 % (ref 11–15)
GLOBULIN SER CALC-MCNC: 2 G/DL (CALC) (ref 1.9–3.7)
GLUCOSE SERPL-MCNC: 102 MG/DL (ref 65–99)
HCT VFR BLD AUTO: 44.3 % (ref 38.5–50)
HGB BLD-MCNC: 14.5 G/DL (ref 13.2–17.1)
LYMPHOCYTES # BLD AUTO: 1467 CELLS/UL (ref 850–3900)
LYMPHOCYTES NFR BLD AUTO: 31.9 %
MCH RBC QN AUTO: 29 PG (ref 27–33)
MCHC RBC AUTO-ENTMCNC: 32.7 G/DL (ref 32–36)
MCV RBC AUTO: 88.6 FL (ref 80–100)
MONOCYTES # BLD AUTO: 359 CELLS/UL (ref 200–950)
MONOCYTES NFR BLD AUTO: 7.8 %
NEUTROPHILS # BLD AUTO: 2558 CELLS/UL (ref 1500–7800)
NEUTROPHILS NFR BLD AUTO: 55.6 %
PLATELET # BLD AUTO: 259 THOUSAND/UL (ref 140–400)
PMV BLD REES-ECKER: 11 FL (ref 7.5–12.5)
POTASSIUM SERPL-SCNC: 4.4 MMOL/L (ref 3.5–5.3)
PROT SERPL-MCNC: 6.4 G/DL (ref 6.1–8.1)
RBC # BLD AUTO: 5 MILLION/UL (ref 4.2–5.8)
SL AMB EGFR AFRICAN AMERICAN: 107 ML/MIN/1.73M2
SL AMB EGFR NON AFRICAN AMERICAN: 93 ML/MIN/1.73M2
SODIUM SERPL-SCNC: 139 MMOL/L (ref 135–146)
TESTOST FREE SERPL-MCNC: 102.9 PG/ML (ref 35–155)
TESTOST SERPL-MCNC: 865 NG/DL (ref 250–1100)
WBC # BLD AUTO: 4.6 THOUSAND/UL (ref 3.8–10.8)

## 2018-10-11 DIAGNOSIS — E34.9 TESTOSTERONE DEFICIENCY: Primary | ICD-10-CM

## 2019-02-10 DIAGNOSIS — E34.9 TESTOSTERONE DEFICIENCY: ICD-10-CM

## 2019-06-11 DIAGNOSIS — E34.9 TESTOSTERONE DEFICIENCY: ICD-10-CM

## 2019-08-27 ENCOUNTER — OFFICE VISIT (OUTPATIENT)
Dept: FAMILY MEDICINE CLINIC | Facility: CLINIC | Age: 49
End: 2019-08-27
Payer: COMMERCIAL

## 2019-08-27 VITALS
WEIGHT: 178 LBS | SYSTOLIC BLOOD PRESSURE: 122 MMHG | HEIGHT: 67 IN | RESPIRATION RATE: 16 BRPM | HEART RATE: 72 BPM | DIASTOLIC BLOOD PRESSURE: 64 MMHG | OXYGEN SATURATION: 99 % | BODY MASS INDEX: 27.94 KG/M2

## 2019-08-27 DIAGNOSIS — Z80.42 FAMILY HX OF PROSTATE CANCER: ICD-10-CM

## 2019-08-27 DIAGNOSIS — E23.0 HYPOGONADOTROPIC HYPOGONADISM (HCC): ICD-10-CM

## 2019-08-27 DIAGNOSIS — F52.21 ERECTILE DYSFUNCTION OF NON-ORGANIC ORIGIN: ICD-10-CM

## 2019-08-27 DIAGNOSIS — Z12.5 PROSTATE CANCER SCREENING: ICD-10-CM

## 2019-08-27 DIAGNOSIS — E34.9 TESTOSTERONE DEFICIENCY: ICD-10-CM

## 2019-08-27 DIAGNOSIS — E78.00 HYPERCHOLESTEROLEMIA: Primary | ICD-10-CM

## 2019-08-27 DIAGNOSIS — C73 PAPILLARY THYROID CARCINOMA (HCC): ICD-10-CM

## 2019-08-27 PROBLEM — R10.32 GROIN DISCOMFORT, LEFT: Status: RESOLVED | Noted: 2018-04-27 | Resolved: 2019-08-27

## 2019-08-27 PROBLEM — M79.2: Status: RESOLVED | Noted: 2018-04-27 | Resolved: 2019-08-27

## 2019-08-27 PROCEDURE — 3008F BODY MASS INDEX DOCD: CPT | Performed by: FAMILY MEDICINE

## 2019-08-27 PROCEDURE — 99214 OFFICE O/P EST MOD 30 MIN: CPT | Performed by: FAMILY MEDICINE

## 2019-08-27 PROCEDURE — 99396 PREV VISIT EST AGE 40-64: CPT | Performed by: FAMILY MEDICINE

## 2019-08-27 PROCEDURE — 1036F TOBACCO NON-USER: CPT | Performed by: FAMILY MEDICINE

## 2019-08-27 RX ORDER — LEVOTHYROXINE SODIUM 137 UG/1
137 TABLET ORAL DAILY
COMMUNITY
Start: 2019-08-22 | End: 2020-08-21

## 2019-08-27 NOTE — ASSESSMENT & PLAN NOTE
Patient continues to follow a ketogenic diet  Overall his cholesterol is improved but he does have history elevated triglycerides  Triglycerides were not recently completed with labs done for work  Check triglyceride level    He remains on omega-3 fatty acids

## 2019-08-27 NOTE — ASSESSMENT & PLAN NOTE
Management as per endocrinologist at [facility]  Last TSH and free T4 were therapeutic

## 2019-08-27 NOTE — PROGRESS NOTES
Subjective:      Patient ID: Sara Stevens is a 51 y o  male  43-year-old male presents for annual physical examination  Patient does have past medical history of papillary carcinoma of thyroid status post resection  Patient does follow up with Endocrinology at [facility] where he had his cancer treated  He is 2 years status post treatment and is free of disease  Patient remains on levothyroxine 137 mcg once daily  He does have follow-up thyroid function testing performed through [facility]  Patient did have blood work done for his employer which includes biometric testing  Hemoglobin A1c 4 6, total cholesterol 193, HDL 49, , BMI 27 9, blood pressure 117/71, waist circumference 35 inches  Patient feels well  He remains physically active  He is trying to follow a ketogenic diet  Patient questions whether he needs PSA testing  There is family history of prostate cancer around age 48 in his father and grandfather  Patient is also on clomiphene for treatment of hypogonadism  He is due for testosterone testing  Patient overall feels well      Past Medical History:   Diagnosis Date    Disease of thyroid gland        Family History   Problem Relation Age of Onset    Prostate cancer Father     Prostate cancer Paternal Grandfather        Past Surgical History:   Procedure Laterality Date    HERNIA REPAIR      THYROIDECTOMY, PARTIAL          reports that he has never smoked  He has never used smokeless tobacco  He reports that he drinks alcohol  He reports that he does not use drugs  Current Outpatient Medications:     Ascorbic Acid (VITAMIN C) 1000 MG tablet, Take 1,000 mg by mouth daily, Disp: , Rfl:     Cholecalciferol (VITAMIN D) 2000 units CAPS, Take 1 capsule by mouth daily, Disp: , Rfl:     clomiPHENE (CLOMID) 50 mg tablet, 1/2 tablet p o   Every other day, Disp: 15 tablet, Rfl: 5    levothyroxine 137 mcg tablet, Take 137 mcg by mouth daily, Disp: , Rfl:     Magnesium 100 MG TABS, Take by mouth, Disp: , Rfl:     Omega-3 Fatty Acids (FISH OIL) 645 MG CAPS, Take by mouth, Disp: , Rfl:     Potassium 99 MG TABS, Take by mouth, Disp: , Rfl:     The following portions of the patient's history were reviewed and updated as appropriate: allergies, current medications, past family history, past medical history, past social history, past surgical history and problem list     Review of Systems   Constitutional: Negative  HENT: Negative  Eyes: Negative  Respiratory: Negative  Cardiovascular: Negative  Gastrointestinal: Negative  Endocrine: Negative  Genitourinary: Negative  Musculoskeletal: Negative  Skin: Negative  Allergic/Immunologic: Negative  Neurological: Negative  Hematological: Negative  Psychiatric/Behavioral: Negative  All other systems reviewed and are negative  Objective:    /64   Pulse 72   Resp 16   Ht 5' 7" (1 702 m)   Wt 80 7 kg (178 lb)   SpO2 99%   BMI 27 88 kg/m²      Physical Exam   Constitutional: He is oriented to person, place, and time  He appears well-developed and well-nourished  HENT:   Head: Normocephalic  Eyes: Pupils are equal, round, and reactive to light  Conjunctivae and EOM are normal    Neck: Normal range of motion  Neck supple  No thyromegaly present  Cardiovascular: Normal rate, regular rhythm and normal heart sounds  Pulmonary/Chest: Effort normal and breath sounds normal    Abdominal: Soft  Bowel sounds are normal    Musculoskeletal: Normal range of motion  Lymphadenopathy:     He has no cervical adenopathy  Neurological: He is alert and oriented to person, place, and time  Psychiatric: He has a normal mood and affect  His behavior is normal  Judgment and thought content normal    Nursing note and vitals reviewed  No results found for this or any previous visit (from the past 1008 hour(s))      Assessment/Plan:    Papillary thyroid carcinoma [facility])  Follow-up with endocrinologist at [facility]    Postoperative hypothyroidism  Management as per endocrinologist at [facility]  Last TSH and free T4 were therapeutic  Hypogonadotropic hypogonadism ([address] )  Patient remains on clomiphene 25 mg every other day  This has been effective for the patient  Patient will have repeat free and total testosterone level performed    Prostate cancer screening  Patient does have family history of prostate cancer in first-degree relatives  Check screening PSA    Hypercholesterolemia  Patient continues to follow a ketogenic diet  Overall his cholesterol is improved but he does have history elevated triglycerides  Triglycerides were not recently completed with labs done for work  Check triglyceride level  He remains on omega-3 fatty acids    Family hx of prostate cancer  Check screening PSA          Problem List Items Addressed This Visit        Endocrine    Hypogonadotropic hypogonadism [facility])     Patient remains on clomiphene 25 mg every other day  This has been effective for the patient  Patient will have repeat free and total testosterone level performed         Relevant Orders    CBC and differential    Testosterone, free, total    Comprehensive metabolic panel    Papillary thyroid carcinoma [facility])     Follow-up with endocrinologist at [facility]         Relevant Medications    levothyroxine 137 mcg tablet    Other Relevant Orders    PSA, Total Screen       Other    Erectile dysfunction of non-organic origin    Family hx of prostate cancer     Check screening PSA         Hypercholesterolemia - Primary     Patient continues to follow a ketogenic diet  Overall his cholesterol is improved but he does have history elevated triglycerides  Triglycerides were not recently completed with labs done for work  Check triglyceride level    He remains on omega-3 fatty acids         Relevant Orders    Triglycerides    Prostate cancer screening     Patient does have family history of prostate cancer in first-degree relatives  Check screening PSA         Relevant Orders    PSA, Total Screen      Other Visit Diagnoses     Testosterone deficiency        Relevant Medications    clomiPHENE (CLOMID) 50 mg tablet          BMI Counseling: Body mass index is 27 88 kg/m²  Discussed the patient's BMI with him  The BMI is above average  BMI counseling and education was provided to the patient  Nutrition recommendations include increasing intake of lean protein, reducing intake of saturated fat and trans fat and reducing intake of cholesterol

## 2019-08-27 NOTE — ASSESSMENT & PLAN NOTE
Patient remains on clomiphene 25 mg every other day  This has been effective for the patient    Patient will have repeat free and total testosterone level performed

## 2019-09-18 LAB
ALBUMIN SERPL-MCNC: 4.9 G/DL (ref 3.6–5.1)
ALBUMIN/GLOB SERPL: 2.3 (CALC) (ref 1–2.5)
ALP SERPL-CCNC: 32 U/L (ref 40–115)
ALT SERPL-CCNC: 22 U/L (ref 9–46)
AST SERPL-CCNC: 18 U/L (ref 10–40)
BASOPHILS # BLD AUTO: 48 CELLS/UL (ref 0–200)
BASOPHILS NFR BLD AUTO: 1 %
BILIRUB SERPL-MCNC: 0.8 MG/DL (ref 0.2–1.2)
BUN SERPL-MCNC: 17 MG/DL (ref 7–25)
BUN/CREAT SERPL: ABNORMAL (CALC) (ref 6–22)
CALCIUM SERPL-MCNC: 9.7 MG/DL (ref 8.6–10.3)
CHLORIDE SERPL-SCNC: 104 MMOL/L (ref 98–110)
CO2 SERPL-SCNC: 25 MMOL/L (ref 20–32)
CREAT SERPL-MCNC: 1.03 MG/DL (ref 0.6–1.35)
EOSINOPHIL # BLD AUTO: 158 CELLS/UL (ref 15–500)
EOSINOPHIL NFR BLD AUTO: 3.3 %
ERYTHROCYTE [DISTWIDTH] IN BLOOD BY AUTOMATED COUNT: 12.6 % (ref 11–15)
GLOBULIN SER CALC-MCNC: 2.1 G/DL (CALC) (ref 1.9–3.7)
GLUCOSE SERPL-MCNC: 86 MG/DL (ref 65–99)
HCT VFR BLD AUTO: 47.4 % (ref 38.5–50)
HGB BLD-MCNC: 15.8 G/DL (ref 13.2–17.1)
LYMPHOCYTES # BLD AUTO: 1555 CELLS/UL (ref 850–3900)
LYMPHOCYTES NFR BLD AUTO: 32.4 %
MCH RBC QN AUTO: 29.9 PG (ref 27–33)
MCHC RBC AUTO-ENTMCNC: 33.3 G/DL (ref 32–36)
MCV RBC AUTO: 89.8 FL (ref 80–100)
MONOCYTES # BLD AUTO: 341 CELLS/UL (ref 200–950)
MONOCYTES NFR BLD AUTO: 7.1 %
NEUTROPHILS # BLD AUTO: 2698 CELLS/UL (ref 1500–7800)
NEUTROPHILS NFR BLD AUTO: 56.2 %
PLATELET # BLD AUTO: 277 THOUSAND/UL (ref 140–400)
PMV BLD REES-ECKER: 11.6 FL (ref 7.5–12.5)
POTASSIUM SERPL-SCNC: 4.2 MMOL/L (ref 3.5–5.3)
PROT SERPL-MCNC: 7 G/DL (ref 6.1–8.1)
PSA SERPL-MCNC: 0.7 NG/ML
RBC # BLD AUTO: 5.28 MILLION/UL (ref 4.2–5.8)
SL AMB EGFR AFRICAN AMERICAN: 99 ML/MIN/1.73M2
SL AMB EGFR NON AFRICAN AMERICAN: 85 ML/MIN/1.73M2
SODIUM SERPL-SCNC: 139 MMOL/L (ref 135–146)
TESTOST FREE SERPL-MCNC: 72.7 PG/ML (ref 35–155)
TESTOST SERPL-MCNC: 643 NG/DL (ref 250–1100)
TRIGL SERPL-MCNC: 93 MG/DL
WBC # BLD AUTO: 4.8 THOUSAND/UL (ref 3.8–10.8)

## 2020-02-12 ENCOUNTER — TELEPHONE (OUTPATIENT)
Dept: FAMILY MEDICINE CLINIC | Facility: CLINIC | Age: 50
End: 2020-02-12

## 2020-02-12 DIAGNOSIS — E34.9 TESTOSTERONE DEFICIENCY: ICD-10-CM

## 2020-02-12 NOTE — TELEPHONE ENCOUNTER
Patient called for a refill of Clomid   Patient was last seen in August  Patient needs an office visit scheduled to received refills

## 2020-05-08 ENCOUNTER — TELEMEDICINE (OUTPATIENT)
Dept: FAMILY MEDICINE CLINIC | Facility: CLINIC | Age: 50
End: 2020-05-08
Payer: COMMERCIAL

## 2020-05-08 VITALS — WEIGHT: 175 LBS | BODY MASS INDEX: 27.47 KG/M2 | HEIGHT: 67 IN

## 2020-05-08 DIAGNOSIS — Z20.822 EXPOSURE TO COVID-19 VIRUS: ICD-10-CM

## 2020-05-08 DIAGNOSIS — E23.0 HYPOGONADOTROPIC HYPOGONADISM (HCC): Primary | ICD-10-CM

## 2020-05-08 DIAGNOSIS — Z80.42 FAMILY HX OF PROSTATE CANCER: ICD-10-CM

## 2020-05-08 DIAGNOSIS — E89.0 POSTOPERATIVE HYPOTHYROIDISM: ICD-10-CM

## 2020-05-08 DIAGNOSIS — Z12.5 PROSTATE CANCER SCREENING: ICD-10-CM

## 2020-05-08 DIAGNOSIS — E78.00 HYPERCHOLESTEROLEMIA: ICD-10-CM

## 2020-05-08 DIAGNOSIS — C73 PAPILLARY THYROID CARCINOMA (HCC): ICD-10-CM

## 2020-05-08 PROCEDURE — 3008F BODY MASS INDEX DOCD: CPT | Performed by: FAMILY MEDICINE

## 2020-05-08 PROCEDURE — 99213 OFFICE O/P EST LOW 20 MIN: CPT | Performed by: FAMILY MEDICINE

## 2020-05-15 LAB
ALBUMIN SERPL-MCNC: 4.5 G/DL (ref 3.6–5.1)
ALBUMIN/GLOB SERPL: 2.6 (CALC) (ref 1–2.5)
ALP SERPL-CCNC: 30 U/L (ref 36–130)
ALT SERPL-CCNC: 27 U/L (ref 9–46)
AST SERPL-CCNC: 21 U/L (ref 10–40)
BASOPHILS # BLD AUTO: 39 CELLS/UL (ref 0–200)
BASOPHILS NFR BLD AUTO: 0.9 %
BILIRUB SERPL-MCNC: 0.5 MG/DL (ref 0.2–1.2)
BUN SERPL-MCNC: 18 MG/DL (ref 7–25)
BUN/CREAT SERPL: ABNORMAL (CALC) (ref 6–22)
CALCIUM SERPL-MCNC: 9 MG/DL (ref 8.6–10.3)
CHLORIDE SERPL-SCNC: 109 MMOL/L (ref 98–110)
CHOLEST SERPL-MCNC: 192 MG/DL
CHOLEST/HDLC SERPL: 3.7 (CALC)
CO2 SERPL-SCNC: 27 MMOL/L (ref 20–32)
CREAT SERPL-MCNC: 1.02 MG/DL (ref 0.6–1.35)
EOSINOPHIL # BLD AUTO: 249 CELLS/UL (ref 15–500)
EOSINOPHIL NFR BLD AUTO: 5.8 %
ERYTHROCYTE [DISTWIDTH] IN BLOOD BY AUTOMATED COUNT: 12.7 % (ref 11–15)
GLOBULIN SER CALC-MCNC: 1.7 G/DL (CALC) (ref 1.9–3.7)
GLUCOSE SERPL-MCNC: 92 MG/DL (ref 65–99)
HCT VFR BLD AUTO: 45.1 % (ref 38.5–50)
HDLC SERPL-MCNC: 52 MG/DL
HGB BLD-MCNC: 15.1 G/DL (ref 13.2–17.1)
LDLC SERPL CALC-MCNC: 119 MG/DL (CALC)
LYMPHOCYTES # BLD AUTO: 1324 CELLS/UL (ref 850–3900)
LYMPHOCYTES NFR BLD AUTO: 30.8 %
MCH RBC QN AUTO: 29.6 PG (ref 27–33)
MCHC RBC AUTO-ENTMCNC: 33.5 G/DL (ref 32–36)
MCV RBC AUTO: 88.4 FL (ref 80–100)
MONOCYTES # BLD AUTO: 327 CELLS/UL (ref 200–950)
MONOCYTES NFR BLD AUTO: 7.6 %
NEUTROPHILS # BLD AUTO: 2361 CELLS/UL (ref 1500–7800)
NEUTROPHILS NFR BLD AUTO: 54.9 %
NONHDLC SERPL-MCNC: 140 MG/DL (CALC)
PLATELET # BLD AUTO: 242 THOUSAND/UL (ref 140–400)
PMV BLD REES-ECKER: 11.1 FL (ref 7.5–12.5)
POTASSIUM SERPL-SCNC: 4.4 MMOL/L (ref 3.5–5.3)
PROT SERPL-MCNC: 6.2 G/DL (ref 6.1–8.1)
PSA SERPL-MCNC: 0.7 NG/ML
RBC # BLD AUTO: 5.1 MILLION/UL (ref 4.2–5.8)
SARS-COV-2 IGG SERPL QL IA: NEGATIVE
SL AMB EGFR AFRICAN AMERICAN: 100 ML/MIN/1.73M2
SL AMB EGFR NON AFRICAN AMERICAN: 86 ML/MIN/1.73M2
SODIUM SERPL-SCNC: 143 MMOL/L (ref 135–146)
TESTOST FREE SERPL-MCNC: 53.8 PG/ML (ref 35–155)
TESTOST SERPL-MCNC: 522 NG/DL (ref 250–1100)
TRIGL SERPL-MCNC: 99 MG/DL
TSH SERPL-ACNC: 1.56 MIU/L (ref 0.4–4.5)
WBC # BLD AUTO: 4.3 THOUSAND/UL (ref 3.8–10.8)

## 2020-09-02 ENCOUNTER — TELEMEDICINE (OUTPATIENT)
Dept: FAMILY MEDICINE CLINIC | Facility: CLINIC | Age: 50
End: 2020-09-02
Payer: COMMERCIAL

## 2020-09-02 ENCOUNTER — HOSPITAL ENCOUNTER (OUTPATIENT)
Dept: CT IMAGING | Facility: HOSPITAL | Age: 50
Discharge: HOME | End: 2020-09-02
Payer: COMMERCIAL

## 2020-09-02 VITALS — BODY MASS INDEX: 27.47 KG/M2 | TEMPERATURE: 97.4 F | WEIGHT: 175 LBS | HEIGHT: 67 IN

## 2020-09-02 DIAGNOSIS — G44.84 PRIMARY EXERTIONAL HEADACHE: ICD-10-CM

## 2020-09-02 DIAGNOSIS — R51.9 ACUTE INTRACTABLE HEADACHE, UNSPECIFIED HEADACHE TYPE: Primary | ICD-10-CM

## 2020-09-02 DIAGNOSIS — R51.9 ACUTE INTRACTABLE HEADACHE, UNSPECIFIED HEADACHE TYPE: ICD-10-CM

## 2020-09-02 PROCEDURE — 70450 CT HEAD/BRAIN W/O DYE: CPT

## 2020-09-02 PROCEDURE — G1004 CDSM NDSC: HCPCS

## 2020-09-02 PROCEDURE — 99214 OFFICE O/P EST MOD 30 MIN: CPT | Performed by: FAMILY MEDICINE

## 2020-09-02 RX ORDER — BUTALBITAL, ACETAMINOPHEN AND CAFFEINE 50; 325; 40 MG/1; MG/1; MG/1
1 TABLET ORAL EVERY 4 HOURS PRN
Qty: 20 TABLET | Refills: 0 | Status: SHIPPED | OUTPATIENT
Start: 2020-09-02

## 2020-09-02 RX ORDER — BUTALBITAL, ACETAMINOPHEN AND CAFFEINE 50; 325; 40 MG/1; MG/1; MG/1
1 TABLET ORAL EVERY 4 HOURS PRN
Qty: 20 TABLET | Refills: 0 | Status: SHIPPED | OUTPATIENT
Start: 2020-09-02 | End: 2020-09-02 | Stop reason: SDUPTHER

## 2020-09-02 NOTE — ASSESSMENT & PLAN NOTE
Patient given a prescription for Fioricet  I did advise the patient that when he does go to the pharmacy to  the prescription to check his blood pressure at the pharmacy    -patient will be sent for CT scan of the brain to rule out intracranial pathology especially since the patient has history of papillary carcinoma of the thyroid

## 2020-09-02 NOTE — ASSESSMENT & PLAN NOTE
- persistent headache for the last 5 days    -no focal neurologic deficits however this is being done as a virtual visit unfortunately    -patient will be sent for stat CT scan of the brain to rule out any intracranial pathology  Patient does have history of papillary carcinoma of the thyroid    This is being treated and monitored at [facility]

## 2020-09-02 NOTE — PROGRESS NOTES
Virtual Regular Visit      Assessment/Plan:    Problem List Items Addressed This Visit        Other    Acute intractable headache - Primary     Patient given a prescription for Fioricet  I did advise the patient that when he does go to the pharmacy to  the prescription to check his blood pressure at the pharmacy    -patient will be sent for CT scan of the brain to rule out intracranial pathology especially since the patient has history of papillary carcinoma of the thyroid  Relevant Medications    butalbital-acetaminophen-caffeine (FIORICET,ESGIC) -40 mg per tablet    Other Relevant Orders    CT head wo contrast    Primary exertional headache     - persistent headache for the last 5 days    -no focal neurologic deficits however this is being done as a virtual visit unfortunately    -patient will be sent for stat CT scan of the brain to rule out any intracranial pathology  Patient does have history of papillary carcinoma of the thyroid  This is being treated and monitored at [facility]         Relevant Medications    butalbital-acetaminophen-caffeine (FIORICET,ESGIC) -40 mg per tablet    Other Relevant Orders    CT head wo contrast               Reason for visit is   Chief Complaint   Patient presents with    Headache    Virtual Regular Visit        Encounter provider Lynda Ahuja DO    Provider located at [address] [place] [id number]      Recent Visits  No visits were found meeting these conditions  Showing recent visits within past 7 days and meeting all other requirements     Today's Visits  Date Type Provider Dept   09/02/20 Telemedicine Lynda Ahuja DO [facility]   Showing today's visits and meeting all other requirements     Future Appointments  No visits were found meeting these conditions     Showing future appointments within next 150 days and meeting all other requirements        The patient was identified by name and date of birth  Dwight Aldrich was informed that this is a telemedicine visit and that the visit is being conducted through [facility] and patient was informed that this is a secure, HIPAA-compliant platform  He agrees to proceed     My office door was closed  No one else was in the room  He acknowledged consent and understanding of privacy and security of the video platform  The patient has agreed to participate and understands they can discontinue the visit at any time  Patient is aware this is a billable service  Subjective  Dwight Aldrich is a 53 y o  male    50-year-old male with past medical history papillary carcinoma of the thyroid presents complaining of right-sided exertional headache for the last 5 days  Patient states that headache started on Saturday as a sharp, stabbing pain behind his right eye in on the right side of his head  Headache seems to intensify with any type of exertion  He try going running in could not do it because the headache became more throbbing and intense  He went to carry a box of text books down the stairs and the headache intensified as well  Patient does feel a little off and dizzy  No nausea or vomiting  No blurring of his vision  Patient states that the headache becomes less intense when he is not doing anything but does not completely resolve  Patient did have blood pressure checked last month while at [facility] which was 124/78    Patient did try taking over-the-counter NSAID without any relief       Past Medical History:   Diagnosis Date    Disease of thyroid gland        Past Surgical History:   Procedure Laterality Date    HERNIA REPAIR      THYROIDECTOMY, PARTIAL         Current Outpatient Medications   Medication Sig Dispense Refill    Ascorbic Acid (VITAMIN C) 1000 MG tablet Take 1,000 mg by mouth daily      Cholecalciferol (VITAMIN D) 2000 units CAPS Take 1 capsule by mouth daily      clomiPHENE (CLOMID) 50 mg tablet 1/2 tablet p o  Every other day 15 tablet 5    Magnesium 100 MG TABS Take by mouth      Omega-3 Fatty Acids (FISH OIL) 645 MG CAPS Take by mouth      Potassium 99 MG TABS Take by mouth      butalbital-acetaminophen-caffeine (FIORICET,ESGIC) -40 mg per tablet Take 1 tablet by mouth every 4 (four) hours as needed for headaches 20 tablet 0    levothyroxine 137 mcg tablet Take 137 mcg by mouth daily       No current facility-administered medications for this visit  No Known Allergies    Review of Systems   Constitutional: Negative  Eyes: Negative for visual disturbance  Neurological: Positive for dizziness, light-headedness and headaches  Negative for tremors, seizures, syncope, speech difficulty, weakness and numbness  Psychiatric/Behavioral: Negative  Negative for confusion  Video Exam    Vitals:    09/02/20 0918   Temp: (!) 97 4 °F (36 3 °C)   Weight: 79 4 kg (175 lb)   Height: 5' 7" (1 702 m)       Physical Exam  Vitals signs and nursing note reviewed  Constitutional:       General: He is not in acute distress  Appearance: Normal appearance  He is well-developed  He is not ill-appearing  HENT:      Head: Normocephalic and atraumatic  Eyes:      Conjunctiva/sclera: Conjunctivae normal       Pupils: Pupils are equal, round, and reactive to light  Pulmonary:      Effort: Pulmonary effort is normal  No respiratory distress  Neurological:      General: No focal deficit present  Mental Status: He is alert and oriented to person, place, and time  Psychiatric:         Behavior: Behavior normal          Thought Content: Thought content normal          Judgment: Judgment normal           I spent 15 minutes directly with the patient during this visit      VIRTUAL VISIT Sanjana Robins 29  acknowledges that he has consented to an online visit or consultation   He understands that the online visit is based solely on information provided by him, and that, in the absence of a face-to-face physical evaluation by the physician, the diagnosis he receives is both limited and provisional in terms of accuracy and completeness  This is not intended to replace a full medical face-to-face evaluation by the physician  Kamar Harrison understands and accepts these terms

## 2020-09-03 ENCOUNTER — TELEMEDICINE (OUTPATIENT)
Dept: FAMILY MEDICINE CLINIC | Facility: CLINIC | Age: 50
End: 2020-09-03
Payer: COMMERCIAL

## 2020-09-03 DIAGNOSIS — J34.2 DEVIATED NASAL SEPTUM: ICD-10-CM

## 2020-09-03 DIAGNOSIS — J01.10 ACUTE NON-RECURRENT FRONTAL SINUSITIS: Primary | ICD-10-CM

## 2020-09-03 DIAGNOSIS — G44.84 PRIMARY EXERTIONAL HEADACHE: ICD-10-CM

## 2020-09-03 PROBLEM — R51.9 ACUTE INTRACTABLE HEADACHE: Status: RESOLVED | Noted: 2020-09-02 | Resolved: 2020-09-03

## 2020-09-03 PROCEDURE — 99214 OFFICE O/P EST MOD 30 MIN: CPT | Performed by: FAMILY MEDICINE

## 2020-09-03 PROCEDURE — 1036F TOBACCO NON-USER: CPT | Performed by: FAMILY MEDICINE

## 2020-09-03 RX ORDER — CLARITHROMYCIN 500 MG/1
500 TABLET, COATED ORAL EVERY 12 HOURS SCHEDULED
Qty: 14 TABLET | Refills: 0 | Status: SHIPPED | OUTPATIENT
Start: 2020-09-03 | End: 2020-09-10

## 2020-09-03 NOTE — PROGRESS NOTES
Virtual Regular Visit      Assessment/Plan:    Problem List Items Addressed This Visit        Respiratory    Acute non-recurrent frontal sinusitis - Primary    Deviated nasal septum       Other    Primary exertional headache               Reason for visit is   Chief Complaint   Patient presents with    Virtual Regular Visit        Encounter provider Anuja Roman DO    Provider located at [address] [place] [id number]      Recent Visits  Date Type Provider Dept   09/02/20 Telemedicine Anuja Roman DO [facility]   Showing recent visits within past 7 days and meeting all other requirements     Future Appointments  No visits were found meeting these conditions  Showing future appointments within next 150 days and meeting all other requirements        The patient was identified by name and date of birth  Chino Sandoval was informed that this is a telemedicine visit and that the visit is being conducted through [facility] and patient was informed that this is a secure, HIPAA-compliant platform  He agrees to proceed     My office door was closed  No one else was in the room  He acknowledged consent and understanding of privacy and security of the video platform  The patient has agreed to participate and understands they can discontinue the visit at any time  Patient is aware this is a billable service  Subjective  Chino Sandoval is a 53 y o  male    Patient presents via video virtual visit to review results of CT scan of the brain that was performed last evening  CT scan brain showed cyst of the sphenoid sinus as well as partial opacification of the right frontal sinus  Patient does have history of deviated nasal septum  He does have history of environmental allergies for which she takes over-the-counter fluticasone nasal spray and Claritin  Patient still has exertional headache  No mass or bleed noted    Otherwise unremarkable CT scan of the brain  Past Medical History:   Diagnosis Date    Disease of thyroid gland        Past Surgical History:   Procedure Laterality Date    HERNIA REPAIR      THYROIDECTOMY, PARTIAL         Current Outpatient Medications   Medication Sig Dispense Refill    Ascorbic Acid (VITAMIN C) 1000 MG tablet Take 1,000 mg by mouth daily      butalbital-acetaminophen-caffeine (FIORICET,ESGIC) -40 mg per tablet Take 1 tablet by mouth every 4 (four) hours as needed for headaches 20 tablet 0    Cholecalciferol (VITAMIN D) 2000 units CAPS Take 1 capsule by mouth daily      clomiPHENE (CLOMID) 50 mg tablet 1/2 tablet p o  Every other day 15 tablet 5    levothyroxine 137 mcg tablet Take 137 mcg by mouth daily      Magnesium 100 MG TABS Take by mouth      Omega-3 Fatty Acids (FISH OIL) 645 MG CAPS Take by mouth      Potassium 99 MG TABS Take by mouth       No current facility-administered medications for this visit  No Known Allergies    Review of Systems   Constitutional: Negative for chills, fatigue and fever  HENT: Positive for congestion, sinus pressure and sinus pain  Negative for ear discharge, ear pain, facial swelling, postnasal drip, rhinorrhea, sore throat and trouble swallowing  Eyes: Negative for discharge, redness and itching  Respiratory: Negative for cough and wheezing  Cardiovascular: Negative  Neurological: Positive for headaches  Negative for dizziness and light-headedness  Video Exam    There were no vitals filed for this visit  Physical Exam  Vitals signs and nursing note reviewed  Constitutional:       Appearance: He is well-developed  HENT:      Head: Normocephalic and atraumatic  Eyes:      Conjunctiva/sclera: Conjunctivae normal       Pupils: Pupils are equal, round, and reactive to light  Pulmonary:      Effort: Pulmonary effort is normal  No respiratory distress     Neurological:      Mental Status: He is alert and oriented to person, place, and time  Psychiatric:         Behavior: Behavior normal          Thought Content: Thought content normal          Judgment: Judgment normal           I spent 10 minutes directly with the patient during this visit      VIRTUAL VISIT Sanjana Robins 28  acknowledges that he has consented to an online visit or consultation  He understands that the online visit is based solely on information provided by him, and that, in the absence of a face-to-face physical evaluation by the physician, the diagnosis he receives is both limited and provisional in terms of accuracy and completeness  This is not intended to replace a full medical face-to-face evaluation by the physician  Smooth Rdz understands and accepts these terms

## 2020-09-03 NOTE — ASSESSMENT & PLAN NOTE
-patient will be treated for acute frontal sinusitis which is likely explaining his headache    Given a course of Biaxin 500 mg twice daily x7 days    -recommended that the patient use over-the-counter Afrin nasal spray or can use his fluticasone nasal spray to help with some nasal congestion as well as flow    -follow-up if no improvement or resolution of symptoms

## 2020-09-03 NOTE — ASSESSMENT & PLAN NOTE
History of nasal bone fracture when he was a child    This may be contributing to decreased drainage from his frontal sinus though he has not gotten many sinus infections in the past     -if he does get recurrent sinus infections then he would need referral to ENT for further evaluation and possibly correction of deviated nasal septum

## 2020-12-28 DIAGNOSIS — E34.9 TESTOSTERONE DEFICIENCY: ICD-10-CM

## 2021-03-11 ENCOUNTER — TELEPHONE (OUTPATIENT)
Dept: FAMILY MEDICINE CLINIC | Facility: CLINIC | Age: 51
End: 2021-03-11

## 2021-03-11 DIAGNOSIS — E23.0 HYPOGONADOTROPIC HYPOGONADISM (HCC): ICD-10-CM

## 2021-03-11 DIAGNOSIS — Z00.00 PHYSICAL EXAM, ANNUAL: Primary | ICD-10-CM

## 2021-03-11 DIAGNOSIS — E89.0 POSTOPERATIVE HYPOTHYROIDISM: ICD-10-CM

## 2021-03-11 DIAGNOSIS — Z12.5 PROSTATE CANCER SCREENING: ICD-10-CM

## 2021-03-11 DIAGNOSIS — E78.00 HYPERCHOLESTEROLEMIA: ICD-10-CM

## 2021-03-11 NOTE — TELEPHONE ENCOUNTER
Patient scheduled physical for 4/23/21 and would like to have labs completed prior to appointment  Can you please generate lab order for patient if he is due for labs?

## 2021-04-08 DIAGNOSIS — Z23 ENCOUNTER FOR IMMUNIZATION: ICD-10-CM

## 2021-04-18 ENCOUNTER — IMMUNIZATIONS (OUTPATIENT)
Dept: FAMILY MEDICINE CLINIC | Facility: HOSPITAL | Age: 51
End: 2021-04-18

## 2021-04-18 DIAGNOSIS — Z23 ENCOUNTER FOR IMMUNIZATION: Primary | ICD-10-CM

## 2021-04-18 LAB
ALBUMIN SERPL-MCNC: 4.5 G/DL (ref 3.6–5.1)
ALBUMIN/GLOB SERPL: 2.5 (CALC) (ref 1–2.5)
ALP SERPL-CCNC: 28 U/L (ref 35–144)
ALT SERPL-CCNC: 14 U/L (ref 9–46)
AST SERPL-CCNC: 15 U/L (ref 10–35)
BASOPHILS # BLD AUTO: 42 CELLS/UL (ref 0–200)
BASOPHILS NFR BLD AUTO: 1 %
BILIRUB SERPL-MCNC: 0.5 MG/DL (ref 0.2–1.2)
BUN SERPL-MCNC: 19 MG/DL (ref 7–25)
BUN/CREAT SERPL: ABNORMAL (CALC) (ref 6–22)
CALCIUM SERPL-MCNC: 9.3 MG/DL (ref 8.6–10.3)
CHLORIDE SERPL-SCNC: 107 MMOL/L (ref 98–110)
CHOLEST SERPL-MCNC: 189 MG/DL
CHOLEST/HDLC SERPL: 4.5 (CALC)
CO2 SERPL-SCNC: 30 MMOL/L (ref 20–32)
CREAT SERPL-MCNC: 0.91 MG/DL (ref 0.7–1.33)
EOSINOPHIL # BLD AUTO: 231 CELLS/UL (ref 15–500)
EOSINOPHIL NFR BLD AUTO: 5.5 %
ERYTHROCYTE [DISTWIDTH] IN BLOOD BY AUTOMATED COUNT: 12.9 % (ref 11–15)
GLOBULIN SER CALC-MCNC: 1.8 G/DL (CALC) (ref 1.9–3.7)
GLUCOSE SERPL-MCNC: 80 MG/DL (ref 65–99)
HCT VFR BLD AUTO: 44.8 % (ref 38.5–50)
HDLC SERPL-MCNC: 42 MG/DL
HGB BLD-MCNC: 15.1 G/DL (ref 13.2–17.1)
LDLC SERPL CALC-MCNC: 113 MG/DL (CALC)
LYMPHOCYTES # BLD AUTO: 1495 CELLS/UL (ref 850–3900)
LYMPHOCYTES NFR BLD AUTO: 35.6 %
MCH RBC QN AUTO: 30.5 PG (ref 27–33)
MCHC RBC AUTO-ENTMCNC: 33.7 G/DL (ref 32–36)
MCV RBC AUTO: 90.5 FL (ref 80–100)
MONOCYTES # BLD AUTO: 340 CELLS/UL (ref 200–950)
MONOCYTES NFR BLD AUTO: 8.1 %
NEUTROPHILS # BLD AUTO: 2092 CELLS/UL (ref 1500–7800)
NEUTROPHILS NFR BLD AUTO: 49.8 %
NONHDLC SERPL-MCNC: 147 MG/DL (CALC)
PLATELET # BLD AUTO: 132 THOUSAND/UL (ref 140–400)
PMV BLD REES-ECKER: 12.1 FL (ref 7.5–12.5)
POTASSIUM SERPL-SCNC: 4.6 MMOL/L (ref 3.5–5.3)
PROT SERPL-MCNC: 6.3 G/DL (ref 6.1–8.1)
PSA SERPL-MCNC: 0.6 NG/ML
RBC # BLD AUTO: 4.95 MILLION/UL (ref 4.2–5.8)
SL AMB EGFR AFRICAN AMERICAN: 113 ML/MIN/1.73M2
SL AMB EGFR NON AFRICAN AMERICAN: 98 ML/MIN/1.73M2
SODIUM SERPL-SCNC: 143 MMOL/L (ref 135–146)
TESTOST FREE SERPL-MCNC: 79.5 PG/ML (ref 35–155)
TESTOST SERPL-MCNC: 552 NG/DL (ref 250–1100)
TRIGL SERPL-MCNC: 219 MG/DL
TSH SERPL-ACNC: 1.42 MIU/L (ref 0.4–4.5)
WBC # BLD AUTO: 4.2 THOUSAND/UL (ref 3.8–10.8)

## 2021-04-18 PROCEDURE — 91300 SARS-COV-2 / COVID-19 MRNA VACCINE (PFIZER-BIONTECH) 30 MCG: CPT

## 2021-04-18 PROCEDURE — 0001A SARS-COV-2 / COVID-19 MRNA VACCINE (PFIZER-BIONTECH) 30 MCG: CPT

## 2021-04-23 ENCOUNTER — OFFICE VISIT (OUTPATIENT)
Dept: FAMILY MEDICINE CLINIC | Facility: CLINIC | Age: 51
End: 2021-04-23
Payer: COMMERCIAL

## 2021-04-23 VITALS
HEART RATE: 60 BPM | BODY MASS INDEX: 28.09 KG/M2 | OXYGEN SATURATION: 97 % | WEIGHT: 179 LBS | DIASTOLIC BLOOD PRESSURE: 68 MMHG | RESPIRATION RATE: 16 BRPM | SYSTOLIC BLOOD PRESSURE: 122 MMHG | HEIGHT: 67 IN

## 2021-04-23 DIAGNOSIS — E78.00 HYPERCHOLESTEROLEMIA: ICD-10-CM

## 2021-04-23 DIAGNOSIS — Z00.00 PHYSICAL EXAM, ANNUAL: Primary | ICD-10-CM

## 2021-04-23 DIAGNOSIS — L71.9 ROSACEA: ICD-10-CM

## 2021-04-23 DIAGNOSIS — E89.0 POSTOPERATIVE HYPOTHYROIDISM: ICD-10-CM

## 2021-04-23 DIAGNOSIS — E23.0 HYPOGONADOTROPIC HYPOGONADISM (HCC): ICD-10-CM

## 2021-04-23 DIAGNOSIS — C73 PAPILLARY THYROID CARCINOMA (HCC): ICD-10-CM

## 2021-04-23 DIAGNOSIS — Z12.11 COLON CANCER SCREENING: ICD-10-CM

## 2021-04-23 PROCEDURE — 99396 PREV VISIT EST AGE 40-64: CPT | Performed by: FAMILY MEDICINE

## 2021-04-23 PROCEDURE — 3725F SCREEN DEPRESSION PERFORMED: CPT | Performed by: FAMILY MEDICINE

## 2021-04-23 PROCEDURE — 3008F BODY MASS INDEX DOCD: CPT | Performed by: FAMILY MEDICINE

## 2021-04-23 PROCEDURE — 99213 OFFICE O/P EST LOW 20 MIN: CPT | Performed by: FAMILY MEDICINE

## 2021-04-23 PROCEDURE — 1036F TOBACCO NON-USER: CPT | Performed by: FAMILY MEDICINE

## 2021-04-23 NOTE — PROGRESS NOTES
Subjective:      Patient ID: Klaudia Montana is a 49 y o  male  27-year-old male presents for annual physical examination  Patient is still following up at the [facility] in regards to history of papillary carcinoma of the thyroid  Overall the patient has been feeling well  Vegetarian diet  Physically active  Recently had fall while fishing  Patient did have labs performed which showed normal CBC with the exception of platelet count of [id number], normal CMP, total cholesterol 189, triglycerides 219, , HDL 42, normal PSA at 0 6  Family history of prostate cancer  Patient is in need of colon cancer screening    Patient does complain of rash on either side of his nose  Becomes intensely red and flaky especially when exposed to sunlight or outside  Mother has rosacea      Past Medical History:   Diagnosis Date    Disease of thyroid gland        Family History   Problem Relation Age of Onset    Prostate cancer Father     Prostate cancer Paternal Grandfather        Past Surgical History:   Procedure Laterality Date    HERNIA REPAIR      THYROIDECTOMY, PARTIAL          reports that he has never smoked  He has never used smokeless tobacco  He reports current alcohol use  He reports that he does not use drugs        Current Outpatient Medications:     Ascorbic Acid (VITAMIN C) 1000 MG tablet, Take 1,000 mg by mouth daily, Disp: , Rfl:     butalbital-acetaminophen-caffeine (FIORICET,ESGIC) -40 mg per tablet, Take 1 tablet by mouth every 4 (four) hours as needed for headaches, Disp: 20 tablet, Rfl: 0    Cholecalciferol (VITAMIN D) 2000 units CAPS, Take 1 capsule by mouth daily, Disp: , Rfl:     clomiPHENE (CLOMID) 50 mg tablet, TAKE 1/2 TABLET BY MOUTH EVERY OTHER DAY, Disp: 15 tablet, Rfl: 5    Magnesium 100 MG TABS, Take by mouth, Disp: , Rfl:     Omega-3 Fatty Acids (FISH OIL) 645 MG CAPS, Take by mouth, Disp: , Rfl:     Potassium 99 MG TABS, Take by mouth, Disp: , Rfl:     levothyroxine 137 mcg tablet, Take 137 mcg by mouth daily, Disp: , Rfl:     metroNIDAZOLE (METROCREAM) 0 75 % cream, Apply topically 2 (two) times a day, Disp: 45 g, Rfl: 3    The following portions of the patient's history were reviewed and updated as appropriate: allergies, current medications, past family history, past medical history, past social history, past surgical history and problem list     Review of Systems   Constitutional: Negative  HENT: Negative  Eyes: Negative  Respiratory: Negative  Cardiovascular: Negative  Gastrointestinal: Negative  Endocrine: Negative  Genitourinary: Negative  Musculoskeletal: Negative  Skin: Positive for rash  Allergic/Immunologic: Negative  Neurological: Negative  Hematological: Negative  Psychiatric/Behavioral: Negative  All other systems reviewed and are negative  Objective:    /68   Pulse 60   Resp 16   Ht 5' 7" (1 702 m)   Wt 81 2 kg (179 lb)   SpO2 97%   BMI 28 04 kg/m²      Physical Exam  Vitals signs and nursing note reviewed  Constitutional:       General: He is not in acute distress  Appearance: Normal appearance  He is well-developed and normal weight  He is not ill-appearing  HENT:      Head: Normocephalic and atraumatic  Right Ear: Tympanic membrane, ear canal and external ear normal       Left Ear: Tympanic membrane, ear canal and external ear normal    Eyes:      Extraocular Movements: Extraocular movements intact  Conjunctiva/sclera: Conjunctivae normal       Pupils: Pupils are equal, round, and reactive to light  Neck:      Musculoskeletal: Normal range of motion and neck supple  Cardiovascular:      Rate and Rhythm: Normal rate and regular rhythm  Pulses: Normal pulses  Heart sounds: Normal heart sounds  No murmur  Pulmonary:      Effort: Pulmonary effort is normal       Breath sounds: Normal breath sounds  Abdominal:      General: Abdomen is flat  Bowel sounds are normal       Palpations: Abdomen is soft  Musculoskeletal: Normal range of motion  Skin:     General: Skin is warm and dry  Findings: Rash (Picture is in media folder  Erythematous patches on either side of the nose consistent with rosacea) present  Neurological:      General: No focal deficit present  Mental Status: He is alert and oriented to person, place, and time  Psychiatric:         Mood and Affect: Mood normal          Behavior: Behavior normal          Thought Content:  Thought content normal          Judgment: Judgment normal            Recent Results (from the past 1008 hour(s))   Lipid panel    Collection Time: 04/15/21  7:08 AM   Result Value Ref Range    Total Cholesterol 189 <200 mg/dL    HDL 42 > OR = 40 mg/dL    Triglycerides 219 (H) <150 mg/dL    LDL Calculated 113 (H) mg/dL (calc)    Chol HDLC Ratio 4 5 <5 0 (calc)    Non-HDL Cholesterol 147 (H) <130 mg/dL (calc)   Comprehensive metabolic panel    Collection Time: 04/15/21  7:08 AM   Result Value Ref Range    Glucose, Random 80 65 - 99 mg/dL    BUN 19 7 - 25 mg/dL    Creatinine 0 91 0 70 - 1 33 mg/dL    eGFR Non  98 > OR = 60 mL/min/1 73m2    eGFR  113 > OR = 60 mL/min/1 73m2    SL AMB BUN/CREATININE RATIO NOT APPLICABLE 6 - 22 (calc)    Sodium 143 135 - 146 mmol/L    Potassium 4 6 3 5 - 5 3 mmol/L    Chloride 107 98 - 110 mmol/L    CO2 30 20 - 32 mmol/L    Calcium 9 3 8 6 - 10 3 mg/dL    Protein, Total 6 3 6 1 - 8 1 g/dL    Albumin 4 5 3 6 - 5 1 g/dL    Globulin 1 8 (L) 1 9 - 3 7 g/dL (calc)    Albumin/Globulin Ratio 2 5 1 0 - 2 5 (calc)    TOTAL BILIRUBIN 0 5 0 2 - 1 2 mg/dL    Alkaline Phosphatase 28 (L) 35 - 144 U/L    AST 15 10 - 35 U/L    ALT 14 9 - 46 U/L   CBC and differential    Collection Time: 04/15/21  7:08 AM   Result Value Ref Range    White Blood Cell Count 4 2 3 8 - 10 8 Thousand/uL    Red Blood Cell Count 4 95 4 20 - 5 80 Million/uL    Hemoglobin 15 1 13 2 - 17 1 g/dL    HCT 44 8 38 5 - 50 0 %    MCV 90 5 80 0 - 100 0 fL    MCH 30 5 27 0 - 33 0 pg    MCHC 33 7 32 0 - 36 0 g/dL    RDW 12 9 11 0 - 15 0 %    Platelet Count 369 (L) 140 - 400 Thousand/uL    SL AMB MPV 12 1 7 5 - 12 5 fL    Neutrophils (Absolute) 2,092 1,500 - 7,800 cells/uL    Lymphocytes (Absolute) 1,495 850 - 3,900 cells/uL    Monocytes (Absolute) 340 200 - 950 cells/uL    Eosinophils (Absolute) 231 15 - 500 cells/uL    Basophils ABS 42 0 - 200 cells/uL    Neutrophils 49 8 %    Lymphocytes 35 6 %    Monocytes 8 1 %    Eosinophils 5 5 %    Basophils PCT 1 0 %   PSA, Total Screen    Collection Time: 04/15/21  7:08 AM   Result Value Ref Range    Prostate Specific Antigen Total 0 6 < OR = 4 0 ng/mL   TSH, 3rd generation with Free T4 reflex    Collection Time: 04/15/21  7:08 AM   Result Value Ref Range    TSH W/RFX TO FREE T4 1 42 0 40 - 4 50 mIU/L   Testosterone, free, total    Collection Time: 04/15/21  7:08 AM   Result Value Ref Range    Testosterone, Total, LC/ 250 - 1,100 ng/dL    Testosterone, Free 79 5 35 0 - 155 0 pg/mL       Assessment/Plan:    Postoperative hypothyroidism  Normalized TSH on thyroid hormone supplementation which is managed by specialist at the [facility]    Papillary thyroid carcinoma [facility])  History of papillary carcinoma of the thyroid  Patient does follow up with endocrinologist at [facility]    Hypogonadotropic hypogonadism [facility])  Patient remains on Clomid  I assume management of this  Doing well  Normal free and total testosterone levels  Refill not necessary at this time    Rosacea  Rashes consistent with rosacea  Prescription provided for [employer] cream to apply to the area twice daily  Recommended using sunblock when outside  Physical exam, annual  Annual physical examination performed    Hypercholesterolemia  Patient has made lifestyle changes  Triglycerides are elevated    This is usually related to dietary intake of carbohydrates though clomiphene can also raise triglycerides  Patient remains on fish oil supplementation  Repeat lipid profile to be done in 6 months    Colon cancer screening  Discussed options for colon cancer screening  Cologuard ordered          Problem List Items Addressed This Visit        Endocrine    Hypogonadotropic hypogonadism [facility])     Patient remains on Clomid  I assume management of this  Doing well  Normal free and total testosterone levels  Refill not necessary at this time         Relevant Orders    CBC and differential    Testosterone, free, total    Papillary thyroid carcinoma ([address] )     History of papillary carcinoma of the thyroid  Patient does follow up with endocrinologist at [facility]         Postoperative hypothyroidism     Normalized TSH on thyroid hormone supplementation which is managed by specialist at the [place] [id number]          Relevant Orders    CBC and differential    TSH, 3rd generation with Free T4 reflex       Musculoskeletal and Integument    Rosacea     Rashes consistent with rosacea  Prescription provided for [employer] cream to apply to the area twice daily  Recommended using sunblock when outside  Relevant Medications    metroNIDAZOLE (METROCREAM) 0 75 % cream       Other    Colon cancer screening     Discussed options for colon cancer screening  Cologuard ordered         Relevant Orders    Cologuard    Hypercholesterolemia     Patient has made lifestyle changes  Triglycerides are elevated  This is usually related to dietary intake of carbohydrates though clomiphene can also raise triglycerides  Patient remains on fish oil supplementation  Repeat lipid profile to be done in 6 months         Relevant Orders    Comprehensive metabolic panel    Lipid panel    Physical exam, annual - Primary     Annual physical examination performed         Relevant Orders    CBC and differential          BMI Counseling: Body mass index is 28 04 kg/m²   The BMI is above normal  Nutrition recommendations include moderation in carbohydrate intake and increasing intake of lean protein

## 2021-04-23 NOTE — ASSESSMENT & PLAN NOTE
History of papillary carcinoma of the thyroid    Patient does follow up with endocrinologist at [facility]

## 2021-04-23 NOTE — ASSESSMENT & PLAN NOTE
Patient has made lifestyle changes  Triglycerides are elevated  This is usually related to dietary intake of carbohydrates though clomiphene can also raise triglycerides  Patient remains on fish oil supplementation    Repeat lipid profile to be done in 6 months

## 2021-04-23 NOTE — ASSESSMENT & PLAN NOTE
Patient remains on Clomid  I assume management of this  Doing well  Normal free and total testosterone levels    Refill not necessary at this time

## 2021-04-23 NOTE — ASSESSMENT & PLAN NOTE
Normalized TSH on thyroid hormone supplementation which is managed by specialist at the [place] [id number]

## 2021-04-23 NOTE — ASSESSMENT & PLAN NOTE
Rashes consistent with rosacea  Prescription provided for [employer] cream to apply to the area twice daily  Recommended using sunblock when outside

## 2021-05-11 ENCOUNTER — IMMUNIZATIONS (OUTPATIENT)
Dept: FAMILY MEDICINE CLINIC | Facility: HOSPITAL | Age: 51
End: 2021-05-11

## 2021-05-11 DIAGNOSIS — Z23 ENCOUNTER FOR IMMUNIZATION: Primary | ICD-10-CM

## 2021-05-11 PROCEDURE — 0002A SARS-COV-2 / COVID-19 MRNA VACCINE (PFIZER-BIONTECH) 30 MCG: CPT

## 2021-05-11 PROCEDURE — 91300 SARS-COV-2 / COVID-19 MRNA VACCINE (PFIZER-BIONTECH) 30 MCG: CPT

## 2021-11-02 LAB
ALBUMIN SERPL-MCNC: 4.7 G/DL (ref 3.6–5.1)
ALBUMIN/GLOB SERPL: 2.4 (CALC) (ref 1–2.5)
ALP SERPL-CCNC: 35 U/L (ref 35–144)
ALT SERPL-CCNC: 20 U/L (ref 9–46)
AST SERPL-CCNC: 18 U/L (ref 10–35)
BASOPHILS # BLD AUTO: 59 CELLS/UL (ref 0–200)
BASOPHILS NFR BLD AUTO: 1.1 %
BILIRUB SERPL-MCNC: 0.7 MG/DL (ref 0.2–1.2)
BUN SERPL-MCNC: 22 MG/DL (ref 7–25)
BUN/CREAT SERPL: ABNORMAL (CALC) (ref 6–22)
CALCIUM SERPL-MCNC: 9.6 MG/DL (ref 8.6–10.3)
CHLORIDE SERPL-SCNC: 108 MMOL/L (ref 98–110)
CHOLEST SERPL-MCNC: 190 MG/DL
CHOLEST/HDLC SERPL: 3.6 (CALC)
CO2 SERPL-SCNC: 28 MMOL/L (ref 20–32)
CREAT SERPL-MCNC: 0.93 MG/DL (ref 0.7–1.33)
EOSINOPHIL # BLD AUTO: 238 CELLS/UL (ref 15–500)
EOSINOPHIL NFR BLD AUTO: 4.4 %
ERYTHROCYTE [DISTWIDTH] IN BLOOD BY AUTOMATED COUNT: 12.3 % (ref 11–15)
GLOBULIN SER CALC-MCNC: 2 G/DL (CALC) (ref 1.9–3.7)
GLUCOSE SERPL-MCNC: 101 MG/DL (ref 65–99)
HCT VFR BLD AUTO: 45.1 % (ref 38.5–50)
HDLC SERPL-MCNC: 53 MG/DL
HGB BLD-MCNC: 15.3 G/DL (ref 13.2–17.1)
LDLC SERPL CALC-MCNC: 117 MG/DL (CALC)
LYMPHOCYTES # BLD AUTO: 2014 CELLS/UL (ref 850–3900)
LYMPHOCYTES NFR BLD AUTO: 37.3 %
MCH RBC QN AUTO: 30 PG (ref 27–33)
MCHC RBC AUTO-ENTMCNC: 33.9 G/DL (ref 32–36)
MCV RBC AUTO: 88.4 FL (ref 80–100)
MONOCYTES # BLD AUTO: 400 CELLS/UL (ref 200–950)
MONOCYTES NFR BLD AUTO: 7.4 %
NEUTROPHILS # BLD AUTO: 2689 CELLS/UL (ref 1500–7800)
NEUTROPHILS NFR BLD AUTO: 49.8 %
NONHDLC SERPL-MCNC: 137 MG/DL (CALC)
PLATELET # BLD AUTO: 234 THOUSAND/UL (ref 140–400)
PMV BLD REES-ECKER: 11.6 FL (ref 7.5–12.5)
POTASSIUM SERPL-SCNC: 4.4 MMOL/L (ref 3.5–5.3)
PROT SERPL-MCNC: 6.7 G/DL (ref 6.1–8.1)
RBC # BLD AUTO: 5.1 MILLION/UL (ref 4.2–5.8)
SL AMB EGFR AFRICAN AMERICAN: 111 ML/MIN/1.73M2
SL AMB EGFR NON AFRICAN AMERICAN: 95 ML/MIN/1.73M2
SODIUM SERPL-SCNC: 142 MMOL/L (ref 135–146)
TESTOST FREE SERPL-MCNC: 117.8 PG/ML (ref 35–155)
TESTOST SERPL-MCNC: 727 NG/DL (ref 250–1100)
TRIGL SERPL-MCNC: 102 MG/DL
TSH SERPL-ACNC: 1.05 MIU/L (ref 0.4–4.5)
WBC # BLD AUTO: 5.4 THOUSAND/UL (ref 3.8–10.8)

## 2021-11-03 ENCOUNTER — RA CDI HCC (OUTPATIENT)
Dept: OTHER | Facility: HOSPITAL | Age: 51
End: 2021-11-03

## 2021-12-10 ENCOUNTER — OFFICE VISIT (OUTPATIENT)
Dept: FAMILY MEDICINE CLINIC | Facility: CLINIC | Age: 51
End: 2021-12-10
Payer: COMMERCIAL

## 2021-12-10 VITALS
OXYGEN SATURATION: 98 % | RESPIRATION RATE: 18 BRPM | BODY MASS INDEX: 28.97 KG/M2 | SYSTOLIC BLOOD PRESSURE: 110 MMHG | WEIGHT: 184.6 LBS | HEIGHT: 67 IN | HEART RATE: 64 BPM | DIASTOLIC BLOOD PRESSURE: 80 MMHG

## 2021-12-10 DIAGNOSIS — Z12.5 PROSTATE CANCER SCREENING: ICD-10-CM

## 2021-12-10 DIAGNOSIS — E23.0 HYPOGONADOTROPIC HYPOGONADISM (HCC): ICD-10-CM

## 2021-12-10 DIAGNOSIS — E34.9 TESTOSTERONE DEFICIENCY: ICD-10-CM

## 2021-12-10 DIAGNOSIS — E78.00 HYPERCHOLESTEROLEMIA: ICD-10-CM

## 2021-12-10 DIAGNOSIS — C73 PAPILLARY THYROID CARCINOMA (HCC): ICD-10-CM

## 2021-12-10 DIAGNOSIS — L71.9 ROSACEA: Primary | ICD-10-CM

## 2021-12-10 PROCEDURE — 3008F BODY MASS INDEX DOCD: CPT | Performed by: FAMILY MEDICINE

## 2021-12-10 PROCEDURE — 1036F TOBACCO NON-USER: CPT | Performed by: FAMILY MEDICINE

## 2021-12-10 PROCEDURE — 3725F SCREEN DEPRESSION PERFORMED: CPT | Performed by: FAMILY MEDICINE

## 2021-12-10 PROCEDURE — 99214 OFFICE O/P EST MOD 30 MIN: CPT | Performed by: FAMILY MEDICINE

## 2022-01-20 ENCOUNTER — CONSULT (OUTPATIENT)
Dept: DERMATOLOGY | Facility: CLINIC | Age: 52
End: 2022-01-20
Payer: COMMERCIAL

## 2022-01-20 VITALS — HEIGHT: 67 IN | BODY MASS INDEX: 28.56 KG/M2 | TEMPERATURE: 98.2 F | WEIGHT: 182 LBS

## 2022-01-20 DIAGNOSIS — Z12.83 SCREENING FOR MALIGNANT NEOPLASM OF SKIN: Primary | ICD-10-CM

## 2022-01-20 PROCEDURE — 99242 OFF/OP CONSLTJ NEW/EST SF 20: CPT | Performed by: DERMATOLOGY

## 2022-01-20 PROCEDURE — 3008F BODY MASS INDEX DOCD: CPT | Performed by: DERMATOLOGY

## 2022-01-20 PROCEDURE — 1036F TOBACCO NON-USER: CPT | Performed by: DERMATOLOGY

## 2022-01-20 NOTE — PROGRESS NOTES
[facility] Note     Patient Name: Mayra Loza  Encounter Date: 01/20/22     Have you been cared for by a [facility] Dermatologist in the last 3 years and, if so, which one? No    · Have you traveled outside of the [address] [place] [address] in the past 3 months or outside of the [facility] in the last 2 weeks? No     May we call your Preferred Phone number to discuss your specific medical information? Yes     May we leave a detailed message that includes your specific medical information? Yes      Today's Chief Concerns:   Concern #1:  Skin check      Past Medical History:  Have you personally ever had or currently have any of the following? · Skin cancer (such as Melanoma, Basal Cell Carcinoma, Squamous Cell Carcinoma? (If Yes, please provide more detail)- No  · Eczema: No  · Psoriasis: No  · HIV/AIDS: No  · Hepatitis B or C: No  · Tuberculosis: No  · Systemic Immunosuppression such as Diabetes, Biologic or Immunotherapy, Chemotherapy, Organ Transplantation, Bone Marrow Transplantation (If YES, please provide more detail): No  · Radiation Treatment (If YES, please provide more detail): No  · Any other major medical conditions/concerns? 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 Family History:  Have any of your "first degree relatives" (parent, brother, sister, or child) had any of the following       · Skin cancer such as Melanoma or Merkel Cell Carcinoma or Pancreatic Cancer? No  · Eczema, Asthma, Hay Fever or Seasonal Allergies: No  · Psoriasis or Psoriatic Arthritis: No  · Do any other medical conditions seem to run in your family? If Yes, what condition and which relatives?   YES, father and paternal grandfather has hx of prostate cancer    Current Medications:         Current Outpatient Medications:     Ascorbic Acid (VITAMIN C) 1000 MG tablet, Take 1,000 mg by mouth daily, Disp: , Rfl:     butalbital-acetaminophen-caffeine (FIORICET,ESGIC) -40 mg per tablet, Take 1 tablet by mouth every 4 (four) hours as needed for headaches, Disp: 20 tablet, Rfl: 0    Cholecalciferol (VITAMIN D) 2000 units CAPS, Take 1 capsule by mouth daily, Disp: , Rfl:     clomiPHENE (CLOMID) 50 mg tablet, TAKE 1/2 TABLET BY MOUTH EVERY OTHER DAY, Disp: 23 tablet, Rfl: 3    Magnesium 100 MG TABS, Take by mouth, Disp: , Rfl:     metroNIDAZOLE (METROCREAM) 0 75 % cream, Apply topically 2 (two) times a day, Disp: 45 g, Rfl: 3    Multiple Vitamins-Minerals (ZINC PO), Take by mouth, Disp: , Rfl:     Omega-3 Fatty Acids (FISH OIL) 645 MG CAPS, Take by mouth, Disp: , Rfl:     Potassium 99 MG TABS, Take by mouth, Disp: , Rfl:     levothyroxine 137 mcg tablet, Take 137 mcg by mouth daily, Disp: , Rfl:       Review of Systems:  Have you recently had or currently have any of the following? If YES, what are you doing for the problem? · Fever, chills or unintended weight loss: No  · Sudden loss or change in your vision: No  · Nausea, vomiting or blood in your stool: No  · Painful or swollen joints: No  · Wheezing or cough: No  · Changing mole or non-healing wound: No  · Nosebleeds: No  · Excessive sweating: No  · Easy or prolonged bleeding? No  · Over the last 2 weeks, how often have you been bothered by the following problems? · Taking little interest or pleasure in doing things: 1 - Not at All  · Feeling down, depressed, or hopeless: 1 - Not at All  · Rapid heartbeat with epinephrine:  No    · Any known allergies? · No Known Allergies      Physical Exam:     Was a chaperone (Derm Clinical Assistant) present throughout the entire Physical Exam? Yes     Did the Dermatology Team specifically  the patient on the importance of a Full Skin Exam to be sure that nothing is missed clinically?  Yes}  o Did the patient ultimately request or accept a Full Skin Exam?  Yes  o Did the patient specifically refuse to have the areas "under-the-bra" examined by the Dermatologist? No  o Did the patient specifically refuse to have the areas "under-the-underwear" examined by the Dermatologist? No    CONSTITUTIONAL:   Vitals:    01/20/22 1543   Temp: 98 2 °F (36 8 °C)   TempSrc: Temporal   Weight: 82 6 kg (182 lb)   Height: 5' 7" (1 702 m)     PSYCH: Normal mood and affect  EYES: Normal conjunctiva  ENT: Normal lips and oral mucosa  CARDIOVASCULAR: No edema  RESPIRATORY: Normal respirations  HEME/LYMPH/IMMUNO:  No regional lymphadenopathy except as noted below in "ASSESSMENT AND PLAN BY DIAGNOSIS"    SKIN:  FULL ORGAN SYSTEM EXAM  Hair, Scalp, Ears, Face Normal except as noted below in Assessment   Neck Normal except as noted below in Assessment   Right Arm/Hand/Fingers Normal except as noted below in Assessment   Left Arm/Hand/Fingers Normal except as noted below in Assessment   Chest/Breasts/Axillae Viewed areas Normal except as noted below in Assessment   Abdomen, Umbilicus Normal except as noted below in Assessment   Back/Spine Normal except as noted below in Assessment   Buttocks Normal except as noted below in Assessment   Right Leg, Foot, Toes Normal except as noted below in Assessment   Left Leg, Foot, Toes Normal except as noted below in Assessment        Assessment and Plan by Diagnosis:    History of Present Condition:     Duration:  How long has this been an issue for you?    o  1 5 years   Location Affected:  Where on the body is this affecting you?    o  face   Quality:  Is there any bleeding, pain, itch, burning/irritation, or redness associated with the skin lesion? o  redness   Severity:  Describe any bleeding, pain, itch, burning/irritation, or redness on a scale of 1 to 10 (with 10 being the worst)  o  6   Timing:  Does this condition seem to be there pretty constantly or do you notice it more at specific times throughout the day?     o constant   Context:  Have you ever noticed that this condition seems to be associated with specific activities you do?    o  denies   Modifying Factors:    o Anything that seems to make the condition worse?    -  not sure  o What have you tried to do to make the condition better?    -  metronidazole 0 075% cream   Associated Signs and Symptoms:  Does this skin lesion seem to be associated with any of the following:  o  SL AMB DERM SIGNS AND SYMPTOMS: Skin color changes       Physical Exam:   Anatomic Location Affected:  face   Morphological Description:  See photos   Pertinent Positives:   Pertinent Negatives: Additional History of Present Condition:  Was prescribed metronidazole cream in past; not helping    Assessment and Plan:  Based on a thorough discussion of this condition and the management approach to it (including a comprehensive discussion of the known risks, side effects and potential benefits of treatment), the patient (family) agrees to implement the following specific plan:   Consider laser treatments; considered cosmetic; not covered under insurance              Normal skin appearance today  No questionable skin lesions    Scribe Attestation    I,:  Ardeen Hatchet am acting as a scribe while in the presence of the attending physician :       I,:  Shell Fajardo MD personally performed the services described in this documentation    as scribed in my presence :

## 2022-01-20 NOTE — PATIENT INSTRUCTIONS
MELANOCYTIC NEVI ("Moles")    Assessment and Plan:  Based on a thorough discussion of this condition and the management approach to it (including a comprehensive discussion of the known risks, side effects and potential benefits of treatment), the patient (family) agrees to implement the following specific plan:   Reassured benign     Melanocytic Nevi  Melanocytic nevi ("moles") are caused by collections of the color producing skin cells, or melanocytes, in 1 area in the skin  They can range in color from pink to dark brown and be either raised or flat  Some moles are present at birth (I e , "congenital nevi"), while others come up later in life (i e , "acquired nevi")  Cheryln Kenning exposure also stimulates the body to make more moles, ie the more sun you get the more moles you'll grow  Clinically distinguishing a healthy mole from melanoma may be difficult  The "ABCDE's" of moles have been suggested as a means of helping to alert a person to a suspicious mole and the possible increased risk of melanoma  Asymmetry: Healthy moles tend to be symmetric, while melanomas are often asymmetric  Asymmetry means if you draw a line through the mole, the two halves do not match in color, size, shape, or surface texture Any mole that starts to demonstrate "asymmetry" should be examined promptly by a board certified dermatologist      Border: Healthy moles tend to have discrete, even borders  The border of a melanoma often blends into the normal skin and does not sharply delineate the mole from normal skin  Any mole that starts to demonstrate "uneven borders" should be examined promptly     Color: Healthy moles tend to be one color throughout  Melanomas tend to be made up of different colors ranging from dark black, blue, white, or red    Any mole that demonstrates a color change should be examined promptly    Diameter: Healthy moles tend to be smaller than 0 6 cm in size; an exception are "congenital nevi" that can be larger  Melanomas tend to grow and can often be greater than 0 6 cm (1/4 of an inch, or the size of a pencil eraser)  This is only a guideline, and many normal moles may be larger than 0 6 cm without being unhealthy  Any mole that starts to change in size (small to bigger or bigger to smaller) should be examined promptly    Evolving: Healthy moles tend to "stay the same "  Melanomas may often show signs of change or evolution such as a change in size, shape, color, or elevation  Any mole that starts to itch, bleed, crust, burn, hurt, or ulcerate or demonstrate a change or evolution should be examined promptly by a board certified dermatologist       What are atypical moles or dysplastic nevi? Dysplastic moles are moles that have some of the ABCDE  changes listed above but  are not cancerous  Sometimes a biopsy and microscopic examination are needed to determine the difference  They may indicate an increased risk of melanoma in that person, especially if there is a family history of melanoma  What is a Melanoma? The main concern when looking at a new or changing mole it to evaluate whether it may be a melanoma  The appearance of a "new mole" remains one of the most reliable methods for identifying a malignant melanoma  A melanoma is a type of skin cancer that can be deadly if it spreads throughout the body  The prognosis of a Melanoma depends on how deep it has penetrated in the skin  If caught early, they generally will not have had time to grow into the deeper layers of the skin and they cure rate is then very high  Once the melanoma grows deeper into the skin, the cure rate drops dramatically  Therefore, early detection and removal of a malignant melanoma results in a much better chance of complete cure       LENTIGO    Assessment and Plan:  Based on a thorough discussion of this condition and the management approach to it (including a comprehensive discussion of the known risks, side effects and potential benefits of treatment), the patient (family) agrees to implement the following specific plan:   Reassured benign    What is a lentigo? A lentigo is a pigmented flat or slightly raised lesion with a clearly defined edge  Unlike an ephelis (freckle), it does not fade in the winter months  There are several kinds of lentigo  The name lentigo originally referred to its appearance resembling a small lentil  The plural of lentigo is lentigines, although lentigos is also in common use  Who gets lentigines? Lentigines can affect males and females of all ages and races  Solar lentigines are especially prevalent in fair skinned adults  Lentigines associated with syndromes are present at birth or arise during childhood  What causes lentigines? Common forms of lentigo are due to exposure to ultraviolet radiation:   Sun damage including sunburn    Indoor tanning    Phototherapy, especially photochemotherapy (PUVA)    Ionizing radiation, eg radiation therapy, can also cause lentigines  Several familial syndromes associated with widespread lentigines originate from mutations in Albert-MAP kinase, mTOR signaling and PTEN pathways  What are the clinical features of lentigines? Lentigines have been classified into several different types depending on what they look like, where they appear on the body, causative factors, and whether they are associated to other diseases or conditions  Lentigines may be solitary or more often, multiple  Most lentigines are smaller than 5 mm in diameter      Lentigo simplex   A precursor to junctional naevus    Arises during childhood and early adult life    Found on trunk and limbs    Small brown round or oval macule or thin plaque    Jagged or smooth edge    May have a dry surface    May disappear in time  Solar lentigo   A precursor to seborrhoeic keratosis    Found on chronically sun exposed sites such as hands, face, lower legs    May also follow sunburn to shoulders    Yellow, light or dark brown regular or irregular macule or thin plaque    May have a dry surface    Often has moth-eaten outline    Can slowly enlarge to several centimeters in diameter    May disappear, often through the process known as lichenoid keratosis    When atypical in appearance, may be difficult to distinguish from melanoma in situ  Ink spot lentigo   Also known as reticulated lentigo    Few in number compared to solar lentigines    Follows sunburn in very fair skinned individuals    Dark brown to black irregular ink spot-like macule  PUVA lentigo   Similar to ink spot lentigo but follows photochemotherapy (PUVA)    Location anywhere exposed to PUVA  Tanning bed lentigo   Similar to ink spot lentigo but follows indoor tanning    Location anywhere exposed to tanning bed  Radiation lentigo   Occurs in site of irradiation (accidental or therapeutic)    Associated with late-stage radiation dermatitis: epidermal atrophy, subcutaneous fibrosis, keratosis, telangiectasias  Melanotic macule   Mucosal surfaces or adjacent glabrous skin eg lip, vulva, penis, anus    Light to dark brown    Also called mucosal melanosis  Generalised lentigines   Found on any exposed or covered site from early childhood    Small macules may merge to form larger patches    Not associated with a syndrome    Also called lentigines profusa, multiple lentigines  Agminated lentigines   Naevoid eruption of lentigos confined to a single segmental area    Sharp demarcation in midline    May have associated neurological and developmental abnormalities  Patterned lentigines   Inherited tendency to lentigines on face, lips, buttocks, palms, soles    Recognised mainly in people of  ethnicity  Centrofacial neurodysraphic lentiginosis  Associated with mental retardation  Lentiginosis syndromes   Syndromes include LEOPARD/Adrianna, Peutz-Jeghers, Laugier-Hunziker, Moynahan, Xeroderma pigmentosum, myxoma syndromes (HARDY, NAME, Medrano), Ruvalcaba-Myhre-Santos, Bannayan-Zonnana syndrome, Cowden disease (multiple hamartoma syndrome )    Inheritance is autosomal dominant; sporadic cases common    Widespread lentigines present at birth or arise in early childhood    Associated with neural, endocrine, and mesenchymal tumors    How is the diagnosis made? Lentigines are usually diagnosed clinically by their typical appearance  Concern regarding possibility of melanoma may lead to:   Dermatoscopy    Diagnostic excision biopsy    Histopathology of a lentigo shows:   Thickened epidermis    An increased number of melanocytes along the basal layer of epidermis    Unlike junctional melanocytic naevus, there are no nests of melanocytes    Increased melanin pigment within the keratinocytes    Additional features depending on type of lentigo    In contrast, an ephelis (freckle) shows sun-induced increased melanin within the keratinocytes, without an increase in number of cells  What is the treatment for lentigines? Most lentigines are left alone  Attempts to lighten them may not be successful  The following approaches are used:   SPF 50+ broad-spectrum sunscreen    Hydroquinone bleaching cream    Alpha hydroxy acids    Vitamin C    Retinoids    Azelaic acid    Chemical peels  Individual lesions can be permanently removed using:   Cryotherapy    Intense pulsed light    Pigment lasers    How can lentigines be prevented? Lentigines associated with exposure ultraviolet radiation can be prevented by very careful sun protection  Clothing is more successful at preventing new lentigines than are sunscreens  What is the outlook for lentigines? Lentigines usually persist  They may increase in number with age and sun exposure  Some in sun-protected sites may fade and disappear      SPIDER TELANGIECTASIS ("SPIDER ANGIOMA")    Assessment and Plan:  Based on a thorough discussion of this condition and the management approach to it (including a comprehensive discussion of the known risks, side effects and potential benefits of treatment), the patient (family) agrees to implement the following specific plan:   Consider laser treatments; considered cosmetic; not covered under insurance    Spider Telangiectasis  A spider telangiectasis (plural: telangiectases) is composed of dilated blood vessels, and is clinically characterised by its spider-like appearance  It is given that name because it has a central red papule (the body of the 'spider') from which fine red lines (the spider legs) extend radially  An alternative explanation for the name explains that the red lines form a spider-like network  Other names for spider telangiectasis are spider angioma (a misnomer), spider naevus and naevus araneus  A solitary spider telangiectasis is common in children and adults, affecting 10-15% of the population  Although they can affect people of any race, spider telangiectases are more easily seen in fair skin compared to skin of colour  Multiple spider telangiectases arise most frequently in pregnancy, in women taking a combined oral contraceptive pill, in patients with liver disease (particularly, in cirrhosis due to alcohol abuse), and in those with thyrotoxicosis  What causes a spider telangiectasis? Spider telangiectasis is an acquired vascular malformation  It occurs because of the failure of a tiny muscle restricting the size of an arteriole  Increased pulsating flow through the vessel (the central papule) results in the dilatation of distal vessels (the red lines)  Spider telangiectasis may arise spontaneously or may be induced by circulating oestrogen, which is increased in pregnancy, women taking combined oral contraceptives, and in those with liver disease  Various vascular endothelial growth factors may be involved      Spider telangiectases are often located on the face, neck, and upper chest (this has been postulated to relate to the distribution of a large vein draining the heart, the superior vena cava)  Spider telangiectases may also occur on the hands, arms, or other sites  Spider telangiectases vary in size and number, tending to be larger and more numerous in people with severe liver disease when other cutaneous signs of liver disease may be present such as palmar erythema, leukonychia, and jaundice  A central dilated arteriole may be present without radial capillaries, and the capillaries may vary in diameter, length, and number  They can also be star-shaped  The lesions may briefly bleed on trauma but otherwise do not cause any symptoms or complications  A spider telangiectasis is diagnosed by its typical appearance  Compression of the central arteriole results in the disappearance of the radial capillaries, which rapidly refill when the compression is relieved  This is best seen through a transparent object, such as a glass slide or the lens of a contact dermatoscope  Spider telangiectases are distinct from 'spider veins', which are blue-coloured dilated venules arising on the thighs and lower legs and often associated with varicose veins  What is the treatment for a spider telangiectasis? A spider telangiectasis is harmless and does not require treatment  A lesion that is unsightly can be removed by destroying the feeding central arteriole, but this may result in a small scar  Treatments to remove spider telangiectasis include:   Cryotherapy   Electrocautery   Intense pulsed light   Vascular laser  Surgical excision is rarely necessary and inevitably leaves a scar  Spider telangiectases can persist or disappear  In women, oestrogen-induced telangiectases often disappear within 6 months after having a baby or of stopping the combined contraceptive pill  They can also reappear after initially successful treatment

## 2022-03-28 LAB
ALBUMIN SERPL-MCNC: 4.4 G/DL (ref 3.6–5.1)
ALBUMIN/GLOB SERPL: 2.3 (CALC) (ref 1–2.5)
ALP SERPL-CCNC: 36 U/L (ref 35–144)
ALT SERPL-CCNC: 22 U/L (ref 9–46)
AST SERPL-CCNC: 21 U/L (ref 10–35)
BASOPHILS # BLD AUTO: 50 CELLS/UL (ref 0–200)
BASOPHILS NFR BLD AUTO: 1 %
BILIRUB SERPL-MCNC: 0.5 MG/DL (ref 0.2–1.2)
BUN SERPL-MCNC: 19 MG/DL (ref 7–25)
BUN/CREAT SERPL: NORMAL (CALC) (ref 6–22)
CALCIUM SERPL-MCNC: 8.8 MG/DL (ref 8.6–10.3)
CHLORIDE SERPL-SCNC: 107 MMOL/L (ref 98–110)
CHOLEST SERPL-MCNC: 180 MG/DL
CHOLEST/HDLC SERPL: 3.6 (CALC)
CO2 SERPL-SCNC: 29 MMOL/L (ref 20–32)
CREAT SERPL-MCNC: 0.88 MG/DL (ref 0.7–1.33)
EOSINOPHIL # BLD AUTO: 240 CELLS/UL (ref 15–500)
EOSINOPHIL NFR BLD AUTO: 4.8 %
ERYTHROCYTE [DISTWIDTH] IN BLOOD BY AUTOMATED COUNT: 12.8 % (ref 11–15)
GLOBULIN SER CALC-MCNC: 1.9 G/DL (CALC) (ref 1.9–3.7)
GLUCOSE SERPL-MCNC: 87 MG/DL (ref 65–99)
HCT VFR BLD AUTO: 42.1 % (ref 38.5–50)
HDLC SERPL-MCNC: 50 MG/DL
HGB BLD-MCNC: 14.1 G/DL (ref 13.2–17.1)
LDLC SERPL CALC-MCNC: 108 MG/DL (CALC)
LYMPHOCYTES # BLD AUTO: 1525 CELLS/UL (ref 850–3900)
LYMPHOCYTES NFR BLD AUTO: 30.5 %
MCH RBC QN AUTO: 29.1 PG (ref 27–33)
MCHC RBC AUTO-ENTMCNC: 33.5 G/DL (ref 32–36)
MCV RBC AUTO: 87 FL (ref 80–100)
MONOCYTES # BLD AUTO: 370 CELLS/UL (ref 200–950)
MONOCYTES NFR BLD AUTO: 7.4 %
NEUTROPHILS # BLD AUTO: 2815 CELLS/UL (ref 1500–7800)
NEUTROPHILS NFR BLD AUTO: 56.3 %
NONHDLC SERPL-MCNC: 130 MG/DL (CALC)
PLATELET # BLD AUTO: 212 THOUSAND/UL (ref 140–400)
PMV BLD REES-ECKER: 10.9 FL (ref 7.5–12.5)
POTASSIUM SERPL-SCNC: 4 MMOL/L (ref 3.5–5.3)
PROT SERPL-MCNC: 6.3 G/DL (ref 6.1–8.1)
PSA SERPL-MCNC: 0.66 NG/ML
RBC # BLD AUTO: 4.84 MILLION/UL (ref 4.2–5.8)
SL AMB EGFR AFRICAN AMERICAN: 115 ML/MIN/1.73M2
SL AMB EGFR NON AFRICAN AMERICAN: 99 ML/MIN/1.73M2
SODIUM SERPL-SCNC: 141 MMOL/L (ref 135–146)
TESTOST FREE SERPL-MCNC: 90.4 PG/ML (ref 35–155)
TESTOST SERPL-MCNC: 508 NG/DL (ref 250–1100)
TRIGL SERPL-MCNC: 108 MG/DL
TSH SERPL-ACNC: 2.1 MIU/L (ref 0.4–4.5)
WBC # BLD AUTO: 5 THOUSAND/UL (ref 3.8–10.8)

## 2022-04-02 ENCOUNTER — RA CDI HCC (OUTPATIENT)
Dept: OTHER | Facility: HOSPITAL | Age: 52
End: 2022-04-02

## 2022-04-08 ENCOUNTER — OFFICE VISIT (OUTPATIENT)
Dept: FAMILY MEDICINE CLINIC | Facility: CLINIC | Age: 52
End: 2022-04-08
Payer: COMMERCIAL

## 2022-04-08 VITALS
OXYGEN SATURATION: 98 % | SYSTOLIC BLOOD PRESSURE: 128 MMHG | HEART RATE: 72 BPM | HEIGHT: 67 IN | WEIGHT: 184 LBS | RESPIRATION RATE: 16 BRPM | DIASTOLIC BLOOD PRESSURE: 78 MMHG | BODY MASS INDEX: 28.88 KG/M2

## 2022-04-08 DIAGNOSIS — Z00.00 PHYSICAL EXAM, ANNUAL: Primary | ICD-10-CM

## 2022-04-08 DIAGNOSIS — E89.0 POSTOPERATIVE HYPOTHYROIDISM: ICD-10-CM

## 2022-04-08 DIAGNOSIS — E78.00 HYPERCHOLESTEROLEMIA: ICD-10-CM

## 2022-04-08 DIAGNOSIS — E23.0 HYPOGONADOTROPIC HYPOGONADISM (HCC): ICD-10-CM

## 2022-04-08 PROBLEM — M72.2 PLANTAR FASCIITIS, LEFT: Status: ACTIVE | Noted: 2022-04-08

## 2022-04-08 PROBLEM — C73 PAPILLARY THYROID CARCINOMA (HCC): Status: RESOLVED | Noted: 2018-02-23 | Resolved: 2022-04-08

## 2022-04-08 PROCEDURE — 3725F SCREEN DEPRESSION PERFORMED: CPT | Performed by: FAMILY MEDICINE

## 2022-04-08 PROCEDURE — 1036F TOBACCO NON-USER: CPT | Performed by: FAMILY MEDICINE

## 2022-04-08 PROCEDURE — 3008F BODY MASS INDEX DOCD: CPT | Performed by: FAMILY MEDICINE

## 2022-04-08 PROCEDURE — 99396 PREV VISIT EST AGE 40-64: CPT | Performed by: FAMILY MEDICINE

## 2022-04-08 NOTE — PROGRESS NOTES
Subjective:      Patient ID: Dameon Longoria is a 47 y o  male  50-year-old male presents for annual physical examination  Patient does have prior history of papillary thyroid carcinoma status post thyroidectomy  Patient does follow up with endocrinologist at the [facility]  Patient does have history of hypogonadotropic hypogonadism and remains on clomiphene half tablet every other day  Labs reviewed which shows normalized testosterone level, thyroid level  Normal fasting glucose, cholesterol  Patient is exercising regularly  He does run  Patient does complain of pain on the bottom of his left foot  Patient states that the pain started in November and he took a month off from running  Pain is worse when he is not wearing sneakers or is walking around his house in bare feet or wearing his dress shoes to work  He has had plantar fasciitis in the past      Past Medical History:   Diagnosis Date    Cancer [facility]) July 24 2017    thyroid removed    Disease of thyroid gland     Papillary thyroid carcinoma ([facility] [address] ) 2/23/2018       Family History   Problem Relation Age of Onset    Prostate cancer Father         Prostate removed    Hypertension Father         on meds    Prostate cancer Paternal Grandfather     Diabetes Maternal Grandfather        Past Surgical History:   Procedure Laterality Date    HERNIA REPAIR      THYROIDECTOMY, PARTIAL          reports that he has never smoked  He quit smokeless tobacco use about 20 years ago  His smokeless tobacco use included chew  He reports current alcohol use  He reports that he does not use drugs        Current Outpatient Medications:     Ascorbic Acid (VITAMIN C) 1000 MG tablet, Take 1,000 mg by mouth daily, Disp: , Rfl:     Cholecalciferol (VITAMIN D) 2000 units CAPS, Take 1 capsule by mouth daily, Disp: , Rfl:     clomiPHENE (CLOMID) 50 mg tablet, TAKE 1/2 TABLET BY MOUTH EVERY OTHER DAY, Disp: 23 tablet, Rfl: 3    Magnesium 100 MG TABS, Take by mouth, Disp: , Rfl:     Multiple Vitamins-Minerals (ZINC PO), Take by mouth, Disp: , Rfl:     Omega-3 Fatty Acids (FISH OIL) 645 MG CAPS, Take by mouth, Disp: , Rfl:     Potassium 99 MG TABS, Take by mouth, Disp: , Rfl:     butalbital-acetaminophen-caffeine (FIORICET,ESGIC) -40 mg per tablet, Take 1 tablet by mouth every 4 (four) hours as needed for headaches (Patient not taking: Reported on 4/8/2022 ), Disp: 20 tablet, Rfl: 0    levothyroxine 137 mcg tablet, Take 137 mcg by mouth daily, Disp: , Rfl:     metroNIDAZOLE (METROCREAM) 0 75 % cream, Apply topically 2 (two) times a day (Patient not taking: Reported on 4/8/2022 ), Disp: 45 g, Rfl: 3    The following portions of the patient's history were reviewed and updated as appropriate: allergies, current medications, past family history, past medical history, past social history, past surgical history and problem list     Review of Systems   Constitutional: Negative  HENT: Negative  Eyes: Negative  Respiratory: Negative  Cardiovascular: Negative  Gastrointestinal: Negative  Endocrine: Negative  Genitourinary: Negative  Musculoskeletal: Positive for arthralgias (Left foot pain)  Skin: Negative  Allergic/Immunologic: Negative  Neurological: Negative  Hematological: Negative  Psychiatric/Behavioral: Negative  All other systems reviewed and are negative  Objective:    /78   Pulse 72   Resp 16   Ht 5' 7" (1 702 m)   Wt 83 5 kg (184 lb)   SpO2 98%   BMI 28 82 kg/m²      Physical Exam  Vitals and nursing note reviewed  Constitutional:       General: He is not in acute distress  Appearance: Normal appearance  He is well-developed and normal weight  He is not ill-appearing  HENT:      Head: Normocephalic and atraumatic        Right Ear: Tympanic membrane, ear canal and external ear normal       Left Ear: Tympanic membrane, ear canal and external ear normal       Nose: Nose normal  Mouth/Throat:      Mouth: Mucous membranes are moist    Eyes:      Extraocular Movements: Extraocular movements intact  Conjunctiva/sclera: Conjunctivae normal       Pupils: Pupils are equal, round, and reactive to light  Cardiovascular:      Rate and Rhythm: Normal rate and regular rhythm  Pulses: Normal pulses  Heart sounds: Normal heart sounds  No murmur heard  Pulmonary:      Effort: Pulmonary effort is normal       Breath sounds: Normal breath sounds  Abdominal:      General: Abdomen is flat  Bowel sounds are normal       Palpations: Abdomen is soft  Musculoskeletal:         General: Tenderness (Plantar fascia of the left foot) present  Normal range of motion  Cervical back: Normal range of motion and neck supple  Skin:     General: Skin is warm and dry  Neurological:      General: No focal deficit present  Mental Status: He is alert and oriented to person, place, and time  Psychiatric:         Mood and Affect: Mood normal          Behavior: Behavior normal          Thought Content:  Thought content normal          Judgment: Judgment normal            Recent Results (from the past 1008 hour(s))   Lipid panel    Collection Time: 03/22/22  7:00 AM   Result Value Ref Range    Total Cholesterol 180 <200 mg/dL    HDL 50 > OR = 40 mg/dL    Triglycerides 108 <150 mg/dL    LDL Calculated 108 (H) mg/dL (calc)    Chol HDLC Ratio 3 6 <5 0 (calc)    Non-HDL Cholesterol 130 (H) <130 mg/dL (calc)   Comprehensive metabolic panel    Collection Time: 03/22/22  7:00 AM   Result Value Ref Range    Glucose, Random 87 65 - 99 mg/dL    BUN 19 7 - 25 mg/dL    Creatinine 0 88 0 70 - 1 33 mg/dL    eGFR Non  99 > OR = 60 mL/min/1 73m2    eGFR  115 > OR = 60 mL/min/1 73m2    SL AMB BUN/CREATININE RATIO NOT APPLICABLE 6 - 22 (calc)    Sodium 141 135 - 146 mmol/L    Potassium 4 0 3 5 - 5 3 mmol/L    Chloride 107 98 - 110 mmol/L    CO2 29 20 - 32 mmol/L    Calcium 8 8 8 6 - 10 3 mg/dL    Protein, Total 6 3 6 1 - 8 1 g/dL    Albumin 4 4 3 6 - 5 1 g/dL    Globulin 1 9 1 9 - 3 7 g/dL (calc)    Albumin/Globulin Ratio 2 3 1 0 - 2 5 (calc)    TOTAL BILIRUBIN 0 5 0 2 - 1 2 mg/dL    Alkaline Phosphatase 36 35 - 144 U/L    AST 21 10 - 35 U/L    ALT 22 9 - 46 U/L   CBC and differential    Collection Time: 03/22/22  7:00 AM   Result Value Ref Range    White Blood Cell Count 5 0 3 8 - 10 8 Thousand/uL    Red Blood Cell Count 4 84 4 20 - 5 80 Million/uL    Hemoglobin 14 1 13 2 - 17 1 g/dL    HCT 42 1 38 5 - 50 0 %    MCV 87 0 80 0 - 100 0 fL    MCH 29 1 27 0 - 33 0 pg    MCHC 33 5 32 0 - 36 0 g/dL    RDW 12 8 11 0 - 15 0 %    Platelet Count 478 095 - 400 Thousand/uL    SL AMB MPV 10 9 7 5 - 12 5 fL    Neutrophils (Absolute) 2,815 1,500 - 7,800 cells/uL    Lymphocytes (Absolute) 1,525 850 - 3,900 cells/uL    Monocytes (Absolute) 370 200 - 950 cells/uL    Eosinophils (Absolute) 240 15 - 500 cells/uL    Basophils ABS 50 0 - 200 cells/uL    Neutrophils 56 3 %    Lymphocytes 30 5 %    Monocytes 7 4 %    Eosinophils 4 8 %    Basophils PCT 1 0 %   PSA, Total Screen    Collection Time: 03/22/22  7:00 AM   Result Value Ref Range    Prostate Specific Antigen Total 0 66 < OR = 4 00 ng/mL   TSH, 3rd generation with Free T4 reflex    Collection Time: 03/22/22  7:00 AM   Result Value Ref Range    TSH W/RFX TO FREE T4 2 10 0 40 - 4 50 mIU/L   Testosterone, free, total    Collection Time: 03/22/22  7:00 AM   Result Value Ref Range    Testosterone, Total, LC/ 250 - 1,100 ng/dL    Testosterone, Free 90 4 35 0 - 155 0 pg/mL       Assessment/Plan:    Hypogonadotropic hypogonadism (HCC)  Patient remains on low-dose clomiphene  Normal free and total testosterone levels      Postoperative hypothyroidism  Patient does follow up with specialist at the [facility]    Physical exam, annual  Annual physical examination performed    Plantar fasciitis, left  -advised on wearing supportive sneakers, using golf ball for massage, Wilder soc at night to help alleviate pain          Problem List Items Addressed This Visit        Endocrine    Hypogonadotropic hypogonadism [facility])     Patient remains on low-dose clomiphene  Normal free and total testosterone levels           Relevant Orders    Testosterone, free, total    Postoperative hypothyroidism     Patient does follow up with specialist at the [place] [id number]          Relevant Orders    TSH, 3rd generation with Free T4 reflex       Other    Hypercholesterolemia    Relevant Orders    Comprehensive metabolic panel    Lipid panel    Physical exam, annual - Primary     Annual physical examination performed         Relevant Orders    CBC and differential    Comprehensive metabolic panel    Vitamin D 1,25 dihydroxy

## 2022-08-09 DIAGNOSIS — E34.9 TESTOSTERONE DEFICIENCY: ICD-10-CM

## 2022-10-12 PROBLEM — Z12.11 COLON CANCER SCREENING: Status: RESOLVED | Noted: 2021-04-23 | Resolved: 2022-10-12

## 2023-04-07 ENCOUNTER — TELEPHONE (OUTPATIENT)
Dept: FAMILY MEDICINE CLINIC | Facility: CLINIC | Age: 53
End: 2023-04-07

## 2023-04-07 LAB
1,25(OH)2D SERPL-MCNC: 32 PG/ML (ref 18–72)
1,25(OH)2D2 SERPL-MCNC: <8 PG/ML
1,25(OH)2D3 SERPL-MCNC: 32 PG/ML
ALBUMIN SERPL-MCNC: 4.6 G/DL (ref 3.6–5.1)
ALBUMIN/GLOB SERPL: 2.6 (CALC) (ref 1–2.5)
ALP SERPL-CCNC: 39 U/L (ref 35–144)
ALT SERPL-CCNC: 24 U/L (ref 9–46)
AST SERPL-CCNC: 19 U/L (ref 10–35)
BASOPHILS # BLD AUTO: 40 CELLS/UL (ref 0–200)
BASOPHILS NFR BLD AUTO: 1.1 %
BILIRUB SERPL-MCNC: 0.6 MG/DL (ref 0.2–1.2)
BUN SERPL-MCNC: 19 MG/DL (ref 7–25)
BUN/CREAT SERPL: ABNORMAL (CALC) (ref 6–22)
CALCIUM SERPL-MCNC: 10 MG/DL (ref 8.6–10.3)
CHLORIDE SERPL-SCNC: 103 MMOL/L (ref 98–110)
CHOLEST SERPL-MCNC: 188 MG/DL
CHOLEST/HDLC SERPL: 3.8 (CALC)
CO2 SERPL-SCNC: 28 MMOL/L (ref 20–32)
CREAT SERPL-MCNC: 0.89 MG/DL (ref 0.7–1.3)
EOSINOPHIL # BLD AUTO: 234 CELLS/UL (ref 15–500)
EOSINOPHIL NFR BLD AUTO: 6.5 %
ERYTHROCYTE [DISTWIDTH] IN BLOOD BY AUTOMATED COUNT: 12.4 % (ref 11–15)
GFR/BSA.PRED SERPLBLD CYS-BASED-ARV: 103 ML/MIN/1.73M2
GLOBULIN SER CALC-MCNC: 1.8 G/DL (CALC) (ref 1.9–3.7)
GLUCOSE SERPL-MCNC: 89 MG/DL (ref 65–99)
HCT VFR BLD AUTO: 44.3 % (ref 38.5–50)
HDLC SERPL-MCNC: 50 MG/DL
HGB BLD-MCNC: 15.3 G/DL (ref 13.2–17.1)
LDLC SERPL CALC-MCNC: 112 MG/DL (CALC)
LYMPHOCYTES # BLD AUTO: 1094 CELLS/UL (ref 850–3900)
LYMPHOCYTES NFR BLD AUTO: 30.4 %
MCH RBC QN AUTO: 29.4 PG (ref 27–33)
MCHC RBC AUTO-ENTMCNC: 34.5 G/DL (ref 32–36)
MCV RBC AUTO: 85.2 FL (ref 80–100)
MONOCYTES # BLD AUTO: 295 CELLS/UL (ref 200–950)
MONOCYTES NFR BLD AUTO: 8.2 %
NEUTROPHILS # BLD AUTO: 1937 CELLS/UL (ref 1500–7800)
NEUTROPHILS NFR BLD AUTO: 53.8 %
NONHDLC SERPL-MCNC: 138 MG/DL (CALC)
PLATELET # BLD AUTO: 239 THOUSAND/UL (ref 140–400)
PMV BLD REES-ECKER: 11.1 FL (ref 7.5–12.5)
POTASSIUM SERPL-SCNC: 4.2 MMOL/L (ref 3.5–5.3)
PROT SERPL-MCNC: 6.4 G/DL (ref 6.1–8.1)
RBC # BLD AUTO: 5.2 MILLION/UL (ref 4.2–5.8)
SODIUM SERPL-SCNC: 139 MMOL/L (ref 135–146)
T4 FREE SERPL-MCNC: 1.7 NG/DL (ref 0.8–1.8)
TESTOST FREE SERPL-MCNC: 64.1 PG/ML (ref 35–155)
TESTOST SERPL-MCNC: 509 NG/DL (ref 250–1100)
TRIGL SERPL-MCNC: 151 MG/DL
TSH SERPL-ACNC: 0.26 MIU/L (ref 0.4–4.5)
WBC # BLD AUTO: 3.6 THOUSAND/UL (ref 3.8–10.8)

## 2023-04-07 NOTE — TELEPHONE ENCOUNTER
----- Message from Du Jerome DO sent at 4/7/2023  2:47 PM EDT -----  Please call the patient regarding his abnormal result  I will be seeing him on April 14 however wanted to make certain that he is aware that his TSH is suppressed at 0 26 with free T4 at 1 7    Not certain if he was scheduled for follow-up with his thyroid cancer specialist

## 2023-04-14 PROBLEM — R35.1 NOCTURIA ASSOCIATED WITH BENIGN PROSTATIC HYPERPLASIA: Status: ACTIVE | Noted: 2023-04-14

## 2023-04-14 PROBLEM — N40.1 NOCTURIA ASSOCIATED WITH BENIGN PROSTATIC HYPERPLASIA: Status: ACTIVE | Noted: 2023-04-14

## 2023-04-14 PROBLEM — I83.891 VARICOSE VEINS OF RIGHT LOWER EXTREMITY WITH OTHER COMPLICATIONS: Status: ACTIVE | Noted: 2023-04-14

## 2023-09-05 ENCOUNTER — OFFICE VISIT (OUTPATIENT)
Dept: VASCULAR SURGERY | Facility: CLINIC | Age: 53
End: 2023-09-05
Payer: COMMERCIAL

## 2023-09-05 VITALS
DIASTOLIC BLOOD PRESSURE: 84 MMHG | OXYGEN SATURATION: 99 % | SYSTOLIC BLOOD PRESSURE: 124 MMHG | HEIGHT: 67 IN | BODY MASS INDEX: 29.19 KG/M2 | WEIGHT: 186 LBS | HEART RATE: 75 BPM

## 2023-09-05 DIAGNOSIS — I83.891 VARICOSE VEINS OF RIGHT LOWER EXTREMITY WITH OTHER COMPLICATIONS: Primary | ICD-10-CM

## 2023-09-05 PROCEDURE — 99203 OFFICE O/P NEW LOW 30 MIN: CPT | Performed by: SURGERY

## 2023-09-05 NOTE — PROGRESS NOTES
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 Now with worsening pain associated with prominent varicosities. Pain worse with running. Tender varicosity along medial aspect of calf with overlying skin discoloration. Plan: Reflux study with RTO to discuss results and treatment options/plan. 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 male. Patient is new to our office. Patient c/o bulging,painful veins in his right leg that causes his leg to cramp and throb. He c/o a patch of veins that are painful to touch and his ankle turn purple. Patient has a history of previous vein surgery done about 5 years ago OS. Patient denies wearing compression stockings. He is a non-smoker. Ji Castro is a pleasant 81-year-old gentleman who presents to the office with painful right leg varicosities. Approximate 5 years ago he did undergo a right lower extremity varicose vein intervention (superficial venous closure)  He now reports worsening varicosities along the right medial thigh and calf. He reports varicose veins are quite painful especially with running. His right foot also becomes discolored during running. The following portions of the patient's history were reviewed and updated as appropriate: allergies, current medications, past family history, past medical history, past social history, past surgical history and problem list.    Review of Systems   Constitutional: Negative. HENT: Negative. Eyes: Negative. Respiratory: Negative. Cardiovascular:        Painful veins   Gastrointestinal: Negative. Endocrine: Negative. Genitourinary: Negative. Musculoskeletal:        Right leg pain   Skin: Negative. Allergic/Immunologic: Negative. Neurological: Negative. Hematological: Negative. Psychiatric/Behavioral: Negative. Objective:      /84 (BP Location: Left arm, Patient Position: Sitting, Cuff Size: Standard)   Pulse 75   Ht 5' 7" (1.702 m)   Wt 84.4 kg (186 lb)   SpO2 99%   BMI 29.13 kg/m²          Physical Exam  Constitutional:       General: He is not in acute distress. Appearance: He is well-developed. HENT:      Head: Normocephalic and atraumatic. Eyes:      General: No scleral icterus. Conjunctiva/sclera: Conjunctivae normal.   Neck:      Trachea: No tracheal deviation. Cardiovascular:      Rate and Rhythm: Normal rate and regular rhythm. Heart sounds: Normal heart sounds. Pulmonary:      Effort: Pulmonary effort is normal.      Breath sounds: Normal breath sounds. Abdominal:      General: There is no distension. Palpations: Abdomen is soft. There is no mass (no appreciable aortic pulsation/aneurysm). Tenderness: There is no abdominal tenderness. There is no guarding or rebound. Musculoskeletal:         General: Normal range of motion. Cervical back: Normal range of motion and neck supple. Skin:     General: Skin is warm and dry. Comments: Varicosities along medial thigh/calf   Neurological:      Mental Status: He is alert and oriented to person, place, and time. Psychiatric:         Mood and Affect: Mood normal.         Behavior: Behavior normal.         Thought Content:  Thought content normal.         Judgment: Judgment normal.         Imaging: no venous imaging available

## 2023-09-05 NOTE — ASSESSMENT & PLAN NOTE
Hx RLE varicose vein intervention approximately 5 years ago. Now with worsening pain associated with prominent varicosities. Pain worse with running. Tender varicosity along medial aspect of calf with overlying skin discoloration. Plan: Reflux study with RTO to discuss results and treatment options/plan.

## 2023-09-22 ENCOUNTER — HOSPITAL ENCOUNTER (OUTPATIENT)
Dept: VASCULAR ULTRASOUND | Facility: HOSPITAL | Age: 53
Discharge: HOME | End: 2023-09-22
Attending: SURGERY
Payer: COMMERCIAL

## 2023-09-22 DIAGNOSIS — I83.891 VARICOSE VEINS OF RIGHT LOWER EXTREMITY WITH OTHER COMPLICATIONS: ICD-10-CM

## 2023-09-22 PROCEDURE — 93971 EXTREMITY STUDY: CPT

## 2023-09-25 PROCEDURE — 93971 EXTREMITY STUDY: CPT | Performed by: SURGERY

## 2023-10-03 DIAGNOSIS — E34.9 TESTOSTERONE DEFICIENCY: ICD-10-CM

## 2023-10-06 ENCOUNTER — TELEPHONE (OUTPATIENT)
Dept: VASCULAR SURGERY | Facility: CLINIC | Age: 53
End: 2023-10-06

## 2023-10-06 ENCOUNTER — OFFICE VISIT (OUTPATIENT)
Dept: VASCULAR SURGERY | Facility: CLINIC | Age: 53
End: 2023-10-06
Payer: COMMERCIAL

## 2023-10-06 VITALS
BODY MASS INDEX: 28.88 KG/M2 | OXYGEN SATURATION: 97 % | WEIGHT: 184 LBS | DIASTOLIC BLOOD PRESSURE: 86 MMHG | SYSTOLIC BLOOD PRESSURE: 124 MMHG | HEIGHT: 67 IN | HEART RATE: 70 BPM

## 2023-10-06 DIAGNOSIS — I83.891 VARICOSE VEINS OF RIGHT LOWER EXTREMITY WITH OTHER COMPLICATIONS: Primary | ICD-10-CM

## 2023-10-06 PROCEDURE — 99214 OFFICE O/P EST MOD 30 MIN: CPT | Performed by: SURGERY

## 2023-10-06 RX ORDER — CEFAZOLIN SODIUM 2 G/50ML
2000 SOLUTION INTRAVENOUS ONCE
OUTPATIENT
Start: 2023-10-06 | End: 2023-10-06

## 2023-10-06 RX ORDER — CHLORHEXIDINE GLUCONATE ORAL RINSE 1.2 MG/ML
15 SOLUTION DENTAL ONCE
OUTPATIENT
Start: 2023-10-06 | End: 2023-10-06

## 2023-10-06 NOTE — TELEPHONE ENCOUNTER
REMINDER: Under Reason For Call, comments MUST be formatted as:   (Surgeon's Initials) / (Procedure)      Special Instructions / FYI: Patient request surgery in mid November due to going on vaction    Procedure: RIGHT ENDOVASCULAR LASER THERAPY (EVLT) + stab phlebectomies     Level: 4 - Route clearance(s) to [facility] Surgery Coordinator Pool    Allergies: Patient has no known allergies. Instructions Given: NO Bowel Prep General Instructions     Dialysis: Patient is not on dialysis. Return Visit Required Prior to Procedure: No.    Consent: I certify that patient has signed, printed, timed, and dated their surgery consent. I certify that the patient's LEGAL NAME and DATE OF BIRTH are written in the upper left corner on BOTH sides of the consent. I certify that BOTH sides of the completed surgery consent have been scanned into the patient's Epic chart by myself on 10/6/2023. Yes, I have LABELED the consent in Epic as Consent for Vascular Procedure. For Surgical Clearances     Levels   1-3   ROUTE this encounter to [facility] Clearance Pool (AND)   [facility] Surgery Coordinator Pool     Level   4   ROUTE this encounter to [facility] Surgery Coordinator Pool       HYDRATION CLEARANCES   ONLY ROUTE TO  [facility] Clearance Pool     Patient does not require any pre operative clearance. Yes, I have ROUTED this encounter to [facility] Surgery Coordinator and/or [facility] Clearance Pool.

## 2023-10-06 NOTE — ASSESSMENT & PLAN NOTE
Rosario Greer turns to the office to review venous reflux study. Of note today patient reports he did have a prior right leg varicose vein intervention approximately 10 years ago? Having said that his venous reflux study does suggest right great saphenous vein recanalization? With reflux. We discussed the potential challenges with redo right great saphenous vein closure specifically with inability to pass the wire and laser fiber. Having said that do recommend attempt at repeat right greater saphenous vein closure with stab phlebectomies. He understands that if I am unable to perform the ablation portion of procedure that we would go ahead and do the phlebectomy portion of the procedure only. The risk benefits, alternatives and anticipated postoperative course were reviewed. All questions were answered to his satisfaction. Patient was agreeable to proceed. Right consent was obtained.

## 2023-10-06 NOTE — PROGRESS NOTES
Assessment/Plan:    Varicose veins of right lower extremity with other complications  Early  turns to the office to review venous reflux study. Of note today patient reports he did have a prior right leg varicose vein intervention approximately 10 years ago? Having said that his venous reflux study does suggest right great saphenous vein recanalization? With reflux. We discussed the potential challenges with redo right great saphenous vein closure specifically with inability to pass the wire and laser fiber. Having said that do recommend attempt at repeat right greater saphenous vein closure with stab phlebectomies. He understands that if I am unable to perform the ablation portion of procedure that we would go ahead and do the phlebectomy portion of the procedure only. The risk benefits, alternatives and anticipated postoperative course were reviewed. All questions were answered to his satisfaction. Patient was agreeable to proceed. Right consent was obtained. Diagnoses and all orders for this visit:    Varicose veins of right lower extremity with other complications  -     Case request operating room: ENDOVASCULAR LASER THERAPY (EVLT) + stab phlebectomies; Standing  -     Case request operating room: ENDOVASCULAR LASER THERAPY (EVLT) + stab phlebectomies    Other orders  -     Diet NPO; Sips with meds; Standing  -     Void on call to OR; Standing  -     Insert peripheral IV; Standing  -     Nursing Communication CHG bath, have staff wash entire body (neck down) per pre op bathing protocol. Routine, evening prior to, and day of surgery.; Standing  -     Nursing Communication Swab both nares with Povidone-Iodine solution, EXCLUDE if patient has shellfish/Iodine allergy, and replace with nasal alcohol swabstick.  Routine, day of surgery, on call to OR.; Standing  -     chlorhexidine (PERIDEX) 0.12 % oral rinse 15 mL  -     Place sequential compression device; Standing  -     ceFAZolin (ANCEF) IVPB (premix in dextrose) 2,000 mg 50 mL          Subjective:      Patient ID: Briseida Song is a 47 y.o. male. Patient is here today to review results of a LEVDR done 9/22/2023. He has a history of a previous vein procedure 5 years ago done OSH. Patient c/o bulging, painful veins of the right lower extremity that cause his leg to cramp and throb. He is a non-smoker. The results of venous reflux study of right lower extremity. The following portions of the patient's history were reviewed and updated as appropriate: allergies, current medications, past family history, past medical history, past social history, past surgical history and problem list.    Review of Systems   Constitutional: Negative. HENT: Negative. Eyes: Negative. Respiratory: Negative. Cardiovascular:        Painful veins   Gastrointestinal: Negative. Endocrine: Negative. Genitourinary: Negative. Musculoskeletal: Negative. Skin: Negative. Allergic/Immunologic: Negative. Neurological: Negative. Hematological: Negative. Psychiatric/Behavioral: Negative. Objective:      /86 (BP Location: Left arm, Patient Position: Sitting, Cuff Size: Standard)   Pulse 70   Ht 5' 7" (1.702 m)   Wt 83.5 kg (184 lb)   SpO2 97%   BMI 28.82 kg/m²          Physical Exam  Constitutional:       General: He is not in acute distress. Appearance: He is well-developed. HENT:      Head: Normocephalic and atraumatic. Eyes:      General: No scleral icterus. Conjunctiva/sclera: Conjunctivae normal.   Neck:      Trachea: No tracheal deviation. Cardiovascular:      Rate and Rhythm: Normal rate and regular rhythm. Heart sounds: Normal heart sounds. Pulmonary:      Effort: Pulmonary effort is normal.      Breath sounds: Normal breath sounds. Abdominal:      General: There is no distension. Palpations: Abdomen is soft. There is no mass (no appreciable aortic pulsation/aneurysm).       Tenderness: There is no abdominal tenderness. There is no guarding or rebound. Musculoskeletal:         General: Normal range of motion. Cervical back: Normal range of motion and neck supple. Skin:     General: Skin is warm and dry. Neurological:      Mental Status: He is alert and oriented to person, place, and time. Psychiatric:         Mood and Affect: Mood normal.         Behavior: Behavior normal.         Thought Content: Thought content normal.         Judgment: Judgment normal.             Venous reflux study:  CONCLUSION:     Impression:  RIGHT LIMB:  No evidence of deep venous incompetence. The great saphenous vein is incompetent. Tech Note: The greater saphenous vein in the proximal and mid calf is difficult  to follow due to the number of varicose veins. The great saphenous vein exits the saphenous compartment in the mid thigh. The small saphenous vein is competent and does not communicate with the  popliteal vein. There is no evidence of incompetent perforators in the thigh or calf. There is no evidence of deep vein thrombosis in the CFV, the proximal PFV, the  femoral vein and the popliteal vein. Operative Scheduling Information:    Hospital:  [facility]    Physician:  Matt De La Vega    Surgery: Right great saphenous vein EVLT and stab phlebectomies    Urgency:  Standard    Level:  Level 4: Outpatients to be scheduled for screening procedures and elective surgery that can be delayed for longer than one month without reasonable expectation of detriment to patient.     Case Length:  2 hours    Post-op Bed:  Outpatient    OR Table:  Standard    Equipment Needs:  None    Medication Instructions:  None    Hydration:  No    Contrast Allergy:  no       EVLT Operative Scheduling Information:      Venous Clinical Severity Scores (VCSS)  Item Absent   (0 points) Mild   (1 point) Moderate   (2 points) Severe   (3 points)   Pain [] None [] Occasional [] Daily [x] Daily limiting   Varicose veins [] None [] Few [x] Calf or thigh [] Calf and thigh   Venous edema [] None [x] Foot and ankle [] Above ankle, below knee [] To knee of above   Skin pigmentation [] None [x] Perimalleolar [] Diffuse, lower 1/3 calf [] Wider, above lower 1/3 calf   Inflammation [x] None [] Perimalleolar [] Diffuse, lower 1/3 calf [] Wider, above lower 1/3 calf   Induration [x] None [] Perimalleolar [] Diffuse, lower 1/3 calf [] Wider, above lower 1/3 calf   No. active ulcers [x] None [] 1 [] 2 [] ? 3   Active ulcer size [x] None [] <2 cm [] 2 - 6 cm [] >6 cm   Ulcer duration [x] None [] <3 months [] 3 - 12 months [] >1 year   Compression therapy [] None [] Intermittent [x] Most days [] Fully comply   Total 9          CEAP Clinical Classification  [x] Symptomatic   [] Asymptomatic     [] Class 0 No visible or palpable signs of venous disease   [] Class 1 Telangiectasies or reticular veins   [x] Class 2 Varicose veins; distinguished from reticular veins by a diameter of 3mm or more   [] Class 3 Edema   [] Class 4 Changes in skin and subcutaneous tissue secondary to CVD    [] Class 4a Pigmentation or eczema   [] Class 4b Lipodermatosclerosis or atrophie jonah   [] Class 5 Healed venous ulcer   [] Class 6 Active venous ulcer

## 2023-10-11 DIAGNOSIS — I83.891 VARICOSE VEINS OF RIGHT LOWER EXTREMITY WITH OTHER COMPLICATIONS: Primary | ICD-10-CM

## 2023-10-11 NOTE — TELEPHONE ENCOUNTER
Verified patient's insurance   CONFIRMED - Patient's insurance is [employer]  Is patient requesting a call when authorization has been obtained? Patient did not request a call. Surgery Date: 11/13/23  Primary Surgeon: Dean Montemayor // Anupama Powers (NPI: [id number])  Assisting Surgeon: Not Applicable (N/A)  Facility: [facility] (Tax: [id number] / NPI: [id number])  Inpatient / Outpatient: Outpatient  Level: 4    Clearance Received: No clearance ordered. Consent Received: Yes, scanned into Epic on 10/5/23. Medication Hold / Last Dose:  no med hold  IR Notified: Not Applicable (N/A)  Rep. Notified: Not Applicable (N/A)  Equipment Needs: Not Applicable (N/A)  Vas Lab Requested:  emailed 10/11/23  Patient Contacted: 10/11/23    Diagnosis: Varicose veins of right lower extremity with other complications X45.249     Procedure/ CPT Code(s): (EVLT) Endovenous Laser Treatment WITH Stab Phlebectomies of the right upper leg // CPT: 22530, 05045     For varicose vein related procedures:   Last LEVDR:  9/2/23, patient's [place] was completed within 12-months of their procedure date. CEAP Classification:  class 2  VCSS:  9    Post Operative Date/ Time: 11/15/23 , 930 dressing removal and 10am duplex [place] with INGA Pete (NPI: [id number])     *Please review medication hold(s), PATs, and check H&P with patient. *  PATIENT WAS MAILED SURGERY/SHOWERING/DISCHARGE/COVID INSTRUCTIONS AFTER REVIEWING WITH THEM VIA PHONE CALL.

## 2023-10-31 ENCOUNTER — ANESTHESIA EVENT (OUTPATIENT)
Dept: PERIOP | Facility: HOSPITAL | Age: 53
End: 2023-10-31
Payer: COMMERCIAL

## 2023-11-07 NOTE — PRE-PROCEDURE INSTRUCTIONS
Pre-Surgery Instructions:   Medication Instructions    Ascorbic Acid (VITAMIN C) 1000 MG tablet Stop taking 7 days prior to surgery. butalbital-acetaminophen-caffeine (FIORICET,ESGIC) -40 mg per tablet Hold day of surgery. Cholecalciferol (VITAMIN D) 2000 units CAPS Stop taking 7 days prior to surgery. clomiPHENE (Clomid) 50 mg tablet Take night before surgery    Magnesium 100 MG TABS Hold day of surgery. metroNIDAZOLE (METROCREAM) 0.75 % cream Hold day of surgery. Multiple Vitamins-Minerals (ZINC PO) Hold day of surgery. Omega-3 Fatty Acids (FISH OIL) 645 MG CAPS Stop taking 7 days prior to surgery. Potassium 99 MG TABS Stop taking 7 days prior to surgery. Synthroid 150 MCG tablet Take day of surgery. Medication instructions for day surgery reviewed. Please use only a sip of water to take your instructed medications. Avoid all over the counter vitamins, supplements and NSAIDS for one week prior to surgery per anesthesia guidelines. Tylenol is ok to take as needed. You will receive a call one business day prior to surgery with an arrival time and hospital directions. If your surgery is scheduled on a Monday, the hospital will be calling you on the Friday prior to your surgery. If you have not heard from anyone by 8pm, please call the hospital supervisor through the hospital  at [phone]. Kwesi Cutler [phone]). Do not eat or drink anything after midnight the night before your surgery, including candy, mints, lifesavers, or chewing gum. Do not drink alcohol 24hrs before your surgery. Try not to smoke at least 24hrs before your surgery. Follow the pre surgery showering instructions as listed in the [facility] Surgical Experience Booklet” or otherwise provided by your surgeon's office. Do not use a blade to shave the surgical area 1 week before surgery. It is okay to use a clean electric clippers up to 24 hours before surgery.  Do not apply any lotions, creams, including makeup, cologne, deodorant, or perfumes after showering on the day of your surgery. Do not use dry shampoo, hair spray, hair gel, or any type of hair products. No contact lenses, eye make-up, or artificial eyelashes. Remove nail polish, including gel polish, and any artificial, gel, or acrylic nails if possible. Remove all jewelry including rings and body piercing jewelry. Wear causal clothing that is easy to take on and off. Consider your type of surgery. Keep any valuables, jewelry, piercings at home. Please bring any specially ordered equipment (sling, braces) if indicated. Arrange for a responsible person to drive you to and from the hospital on the day of your surgery. Visitor Guidelines discussed. Call the surgeon's office with any new illnesses, exposures, or additional questions prior to surgery. Please reference your [facility] Surgical Experience Booklet” for additional information to prepare for your upcoming surgery.

## 2023-11-13 ENCOUNTER — HOSPITAL ENCOUNTER (OUTPATIENT)
Facility: HOSPITAL | Age: 53
Setting detail: OUTPATIENT SURGERY
Discharge: HOME | End: 2023-11-13
Attending: SURGERY | Admitting: SURGERY
Payer: COMMERCIAL

## 2023-11-13 ENCOUNTER — ANESTHESIA (OUTPATIENT)
Dept: PERIOP | Facility: HOSPITAL | Age: 53
End: 2023-11-13
Payer: COMMERCIAL

## 2023-11-13 ENCOUNTER — HOSPITAL ENCOUNTER (OUTPATIENT)
Dept: NON INVASIVE DIAGNOSTICS | Facility: HOSPITAL | Age: 53
Discharge: HOME | End: 2023-11-13
Payer: COMMERCIAL

## 2023-11-13 VITALS
HEIGHT: 67 IN | RESPIRATION RATE: 16 BRPM | TEMPERATURE: 97.4 F | SYSTOLIC BLOOD PRESSURE: 136 MMHG | OXYGEN SATURATION: 96 % | DIASTOLIC BLOOD PRESSURE: 88 MMHG | HEART RATE: 66 BPM | BODY MASS INDEX: 28.48 KG/M2 | WEIGHT: 181.44 LBS

## 2023-11-13 DIAGNOSIS — I83.92 VARICOSE VEINS OF LEFT LOWER EXTREMITY, UNSPECIFIED WHETHER COMPLICATED: ICD-10-CM

## 2023-11-13 DIAGNOSIS — I83.891 VARICOSE VEINS OF RIGHT LOWER EXTREMITY WITH OTHER COMPLICATIONS: Primary | ICD-10-CM

## 2023-11-13 PROCEDURE — 37765 STAB PHLEB VEINS XTR 10-20: CPT | Performed by: SURGERY

## 2023-11-13 PROCEDURE — 99024 POSTOP FOLLOW-UP VISIT: CPT | Performed by: SURGERY

## 2023-11-13 RX ORDER — ONDANSETRON 2 MG/ML
4 INJECTION INTRAMUSCULAR; INTRAVENOUS ONCE AS NEEDED
Status: DISCONTINUED | OUTPATIENT
Start: 2023-11-13 | End: 2023-11-13 | Stop reason: HOSPADM

## 2023-11-13 RX ORDER — FENTANYL CITRATE 50 UG/ML
INJECTION, SOLUTION INTRAMUSCULAR; INTRAVENOUS AS NEEDED
Status: DISCONTINUED | OUTPATIENT
Start: 2023-11-13 | End: 2023-11-13

## 2023-11-13 RX ORDER — MIDAZOLAM HYDROCHLORIDE 2 MG/2ML
INJECTION, SOLUTION INTRAMUSCULAR; INTRAVENOUS AS NEEDED
Status: DISCONTINUED | OUTPATIENT
Start: 2023-11-13 | End: 2023-11-13

## 2023-11-13 RX ORDER — MEPERIDINE HYDROCHLORIDE 25 MG/ML
12.5 INJECTION INTRAMUSCULAR; INTRAVENOUS; SUBCUTANEOUS
Status: DISCONTINUED | OUTPATIENT
Start: 2023-11-13 | End: 2023-11-13 | Stop reason: HOSPADM

## 2023-11-13 RX ORDER — CEFAZOLIN SODIUM 2 G/50ML
2000 SOLUTION INTRAVENOUS ONCE
Status: COMPLETED | OUTPATIENT
Start: 2023-11-13 | End: 2023-11-13

## 2023-11-13 RX ORDER — KETOROLAC TROMETHAMINE 30 MG/ML
INJECTION, SOLUTION INTRAMUSCULAR; INTRAVENOUS AS NEEDED
Status: DISCONTINUED | OUTPATIENT
Start: 2023-11-13 | End: 2023-11-13

## 2023-11-13 RX ORDER — CHLORHEXIDINE GLUCONATE ORAL RINSE 1.2 MG/ML
15 SOLUTION DENTAL ONCE
Status: COMPLETED | OUTPATIENT
Start: 2023-11-13 | End: 2023-11-13

## 2023-11-13 RX ORDER — ONDANSETRON 2 MG/ML
INJECTION INTRAMUSCULAR; INTRAVENOUS AS NEEDED
Status: DISCONTINUED | OUTPATIENT
Start: 2023-11-13 | End: 2023-11-13

## 2023-11-13 RX ORDER — HYDROMORPHONE HCL/PF 1 MG/ML
0.5 SYRINGE (ML) INJECTION
Status: DISCONTINUED | OUTPATIENT
Start: 2023-11-13 | End: 2023-11-13 | Stop reason: HOSPADM

## 2023-11-13 RX ORDER — SODIUM CHLORIDE 9 MG/ML
125 INJECTION, SOLUTION INTRAVENOUS CONTINUOUS
Status: DISCONTINUED | OUTPATIENT
Start: 2023-11-13 | End: 2023-11-13 | Stop reason: HOSPADM

## 2023-11-13 RX ORDER — MAGNESIUM HYDROXIDE 1200 MG/15ML
LIQUID ORAL AS NEEDED
Status: DISCONTINUED | OUTPATIENT
Start: 2023-11-13 | End: 2023-11-13 | Stop reason: HOSPADM

## 2023-11-13 RX ORDER — OXYCODONE HYDROCHLORIDE 5 MG/1
5 TABLET ORAL EVERY 4 HOURS PRN
Qty: 12 TABLET | Refills: 0 | Status: SHIPPED | OUTPATIENT
Start: 2023-11-13 | End: 2023-11-23

## 2023-11-13 RX ORDER — FENTANYL CITRATE/PF 50 MCG/ML
25 SYRINGE (ML) INJECTION
Status: DISCONTINUED | OUTPATIENT
Start: 2023-11-13 | End: 2023-11-13 | Stop reason: HOSPADM

## 2023-11-13 RX ORDER — LIDOCAINE HYDROCHLORIDE 20 MG/ML
INJECTION, SOLUTION EPIDURAL; INFILTRATION; INTRACAUDAL; PERINEURAL AS NEEDED
Status: DISCONTINUED | OUTPATIENT
Start: 2023-11-13 | End: 2023-11-13

## 2023-11-13 RX ORDER — DEXAMETHASONE SODIUM PHOSPHATE 10 MG/ML
INJECTION, SOLUTION INTRAMUSCULAR; INTRAVENOUS AS NEEDED
Status: DISCONTINUED | OUTPATIENT
Start: 2023-11-13 | End: 2023-11-13

## 2023-11-13 RX ORDER — PROPOFOL 10 MG/ML
INJECTION, EMULSION INTRAVENOUS AS NEEDED
Status: DISCONTINUED | OUTPATIENT
Start: 2023-11-13 | End: 2023-11-13

## 2023-11-13 RX ADMIN — FENTANYL CITRATE 50 MCG: 50 INJECTION INTRAMUSCULAR; INTRAVENOUS at 08:41

## 2023-11-13 RX ADMIN — LIDOCAINE HYDROCHLORIDE 5 ML: 20 INJECTION, SOLUTION EPIDURAL; INFILTRATION; INTRACAUDAL at 08:03

## 2023-11-13 RX ADMIN — CEFAZOLIN SODIUM 2000 MG: 2 SOLUTION INTRAVENOUS at 08:05

## 2023-11-13 RX ADMIN — CHLORHEXIDINE GLUCONATE 15 ML: 1.2 RINSE ORAL at 06:58

## 2023-11-13 RX ADMIN — FENTANYL CITRATE 50 MCG: 50 INJECTION INTRAMUSCULAR; INTRAVENOUS at 08:09

## 2023-11-13 RX ADMIN — SODIUM CHLORIDE: 0.9 INJECTION, SOLUTION INTRAVENOUS at 09:02

## 2023-11-13 RX ADMIN — MIDAZOLAM 2 MG: 1 INJECTION INTRAMUSCULAR; INTRAVENOUS at 08:03

## 2023-11-13 RX ADMIN — KETOROLAC TROMETHAMINE 30 MG: 30 INJECTION, SOLUTION INTRAMUSCULAR; INTRAVENOUS at 08:50

## 2023-11-13 RX ADMIN — ONDANSETRON 4 MG: 2 INJECTION INTRAMUSCULAR; INTRAVENOUS at 08:50

## 2023-11-13 RX ADMIN — SODIUM CHLORIDE 125 ML/HR: 0.9 INJECTION, SOLUTION INTRAVENOUS at 07:05

## 2023-11-13 RX ADMIN — PROPOFOL 200 MG: 10 INJECTION, EMULSION INTRAVENOUS at 08:03

## 2023-11-13 RX ADMIN — DEXAMETHASONE SODIUM PHOSPHATE 10 MG: 10 INJECTION INTRAMUSCULAR; INTRAVENOUS at 08:19

## 2023-11-13 NOTE — H&P
H & P    Plan: Right GSV EVLT (redo) & stab phlebectomies  Operative site/varicosities marked  /82   Pulse 62   Temp 97.9 °F (36.6 °C) (Temporal)   Resp 16   Ht 5' 7" (1.702 m)   Wt 82.3 kg (181 lb 7 oz)   SpO2 96%   BMI 28.42 kg/m²         Assessment/Plan:     Varicose veins of right lower extremity with other complications  Daniel Haynes turns to the office to review venous reflux study. Of note today patient reports he did have a prior right leg varicose vein intervention approximately 10 years ago? Having said that his venous reflux study does suggest right great saphenous vein recanalization? With reflux. We discussed the potential challenges with redo right great saphenous vein closure specifically with inability to pass the wire and laser fiber. Having said that do recommend attempt at repeat right greater saphenous vein closure with stab phlebectomies. He understands that if I am unable to perform the ablation portion of procedure that we would go ahead and do the phlebectomy portion of the procedure only. The risk benefits, alternatives and anticipated postoperative course were reviewed. All questions were answered to his satisfaction. Patient was agreeable to proceed. Right consent was obtained. Diagnoses and all orders for this visit:     Varicose veins of right lower extremity with other complications  -     Case request operating room: ENDOVASCULAR LASER THERAPY (EVLT) + stab phlebectomies; Standing  -     Case request operating room: ENDOVASCULAR LASER THERAPY (EVLT) + stab phlebectomies     Other orders  -     Diet NPO; Sips with meds; Standing  -     Void on call to OR; Standing  -     Insert peripheral IV; Standing  -     Nursing Communication CHG bath, have staff wash entire body (neck down) per pre op bathing protocol.  Routine, evening prior to, and day of surgery.; Standing  -     Nursing Communication Swab both nares with Povidone-Iodine solution, EXCLUDE if patient has shellfish/Iodine allergy, and replace with nasal alcohol swabstick. Routine, day of surgery, on call to OR.; Standing  -     chlorhexidine (PERIDEX) 0.12 % oral rinse 15 mL  -     Place sequential compression device; Standing  -     ceFAZolin (ANCEF) IVPB (premix in dextrose) 2,000 mg 50 mL            Subjective:       Patient ID: Gladis Mcmillan is a 47 y.o. male. Patient is here today to review results of a LEVDR done 9/22/2023. He has a history of a previous vein procedure 5 years ago done OSH. Patient c/o bulging, painful veins of the right lower extremity that cause his leg to cramp and throb. He is a non-smoker. The results of venous reflux study of right lower extremity. The following portions of the patient's history were reviewed and updated as appropriate: allergies, current medications, past family history, past medical history, past social history, past surgical history and problem list.     Review of Systems  Constitutional: Negative. HENT: Negative. Eyes: Negative. Respiratory: Negative. Cardiovascular:        Painful veins   Gastrointestinal: Negative. Endocrine: Negative. Genitourinary: Negative. Musculoskeletal: Negative. Skin: Negative. Allergic/Immunologic: Negative. Neurological: Negative. Hematological: Negative. Psychiatric/Behavioral: Negative. Objective:        /86 (BP Location: Left arm, Patient Position: Sitting, Cuff Size: Standard)   Pulse 70   Ht 5' 7" (1.702 m)   Wt 83.5 kg (184 lb)   SpO2 97%   BMI 28.82 kg/m²             Physical Exam  Constitutional:       General: He is not in acute distress. Appearance: He is well-developed. HENT:      Head: Normocephalic and atraumatic. Eyes:      General: No scleral icterus. Conjunctiva/sclera: Conjunctivae normal.   Neck:      Trachea: No tracheal deviation. Cardiovascular:      Rate and Rhythm: Normal rate and regular rhythm.       Heart sounds: Normal heart sounds. Pulmonary:      Effort: Pulmonary effort is normal.      Breath sounds: Normal breath sounds. Abdominal:      General: There is no distension. Palpations: Abdomen is soft. There is no mass (no appreciable aortic pulsation/aneurysm). Tenderness: There is no abdominal tenderness. There is no guarding or rebound. Musculoskeletal:         General: Normal range of motion. Cervical back: Normal range of motion and neck supple. Skin:     General: Skin is warm and dry. Neurological:      Mental Status: He is alert and oriented to person, place, and time. Psychiatric:         Mood and Affect: Mood normal.         Behavior: Behavior normal.         Thought Content: Thought content normal.         Judgment: Judgment normal.                Venous reflux study:  CONCLUSION:     Impression:  RIGHT LIMB:  No evidence of deep venous incompetence. The great saphenous vein is incompetent. Tech Note: The greater saphenous vein in the proximal and mid calf is difficult  to follow due to the number of varicose veins. The great saphenous vein exits the saphenous compartment in the mid thigh. The small saphenous vein is competent and does not communicate with the  popliteal vein. There is no evidence of incompetent perforators in the thigh or calf. There is no evidence of deep vein thrombosis in the CFV, the proximal PFV, the  femoral vein and the popliteal vein.

## 2023-11-13 NOTE — OP NOTE
OPERATIVE REPORT  PATIENT NAME: Mitchell Silva    :  1970  MRN: [id number]  Pt Location: [place] [address]    SURGERY DATE: 2023    Surgeon(s) and Role:     * Teddy Roberts DO - Primary    Preop Diagnosis:  Varicose veins of right lower extremity with other complications [A18.245]    Post-Op Diagnosis Codes: * Varicose veins of right lower extremity with other complications [E04.062]    Procedure(s):  Right - MULTIPLE STAB PHLEB    Specimen(s):  * No specimens in log *    Estimated Blood Loss:   50 mL    Drains:  * No LDAs found *    Anesthesia Type:   General    Operative Indications:  Varicose veins of right lower extremity with other complications [X82.862]    46 y/o male with PMH RLE EVLT presenting with recurrent right leg venous insufficiency. Discussed risk and benefits of intervention. Operative Findings:    Right leg ultrasound revealed mostly thrombosed right GSV. There was a patent segment approx 10cm in length in the mid-thigh that was supplied by varicose veins. This area was accessed with micropuncture wire and needle. However, due to the short segment of patency there was not enough room to introduce the EVLT laser. 13 stab phlebectomies performed. Complications:   None    Procedure and Technique:    The patient was brought to the operating room where he/she was positioned supine on the OR table. After adequate induction of anesthesia via LMA the right leg was circumferentially prepped using chlorhexidine and draped in usual sterile fashion. A formal time-out was performed and all were in agreement. The duplex ultrasound was brought to the field and the saphenous vein in the right leg was mapped from the knee to the groin. The vein was then cannulated with the micropuncture set. However, there prior EVLT treatment had successfully thrombosed with too short a segment to deliver the EVLT laser catheter.     The patient was then placed in the Trendelenburg position and previously marked truncal varicosities on the right leg were avulsed through microphlebectomy incisions. A total of 13 incisions were made. Hemostasis was secured with compression at each puncture site and the incisions were then closed at the completion of the procedure with exofin. The leg was then wrapped with 4x4 gauze, kerlix, Ace wrap and Coban from the foot to the groin. The patient was transferred to recovery in stable condition. I was present for the entire procedure and a qualified resident physician was not available     Dr. Corinne Favor was present for the entire procedure.     Patient Disposition:  PACU         SIGNATURE: Jono Roberts DO  DATE: November 13, 2023  TIME: 9:10 AM

## 2023-11-13 NOTE — ANESTHESIA PREPROCEDURE EVALUATION
Procedure:  (EVLT) (Right: Leg Upper)  MULTIPLE STAB PHLEB (Right: Leg Upper)    Relevant Problems   CARDIO   (+) Hypercholesterolemia      ENDO   (+) Postoperative hypothyroidism      NEURO/PSYCH   (+) Primary exertional headache      Endocrine   (+) Hypogonadotropic hypogonadism (HCC)   (+) Papillary thyroid carcinoma (HCC) (Resolved)        Physical Exam    Airway    Mallampati score: II  TM Distance: >3 FB  Neck ROM: full     Dental        Cardiovascular  Rhythm: regular, Rate: normal, Cardiovascular exam normal    Pulmonary  Pulmonary exam normal Breath sounds clear to auscultation    Other Findings      Anesthesia Plan  ASA Score- 2     Anesthesia Type- general with ASA Monitors. Additional Monitors:     Airway Plan: LMA. Plan Factors-    Chart reviewed. Patient summary reviewed. Patient is not a current smoker. Patient instructed to abstain from smoking on day of procedure. Patient did not smoke on day of surgery. Obstructive sleep apnea risk education given perioperatively. Induction- intravenous. Postoperative Plan-     Informed Consent- Anesthetic plan and risks discussed with patient and spouse Tang Dumont.

## 2023-11-13 NOTE — DISCHARGE INSTR - AVS FIRST PAGE
DISCHARGE INSTRUCTIONS  VARICOSE VEIN SURGERY    ACTIVITY:  On the day of your operation, “take it easy”. You can take short walks around the house. When sitting, the leg should be elevated. The preferred position is to have the leg at or above the level of the heart. Starting on the first day after surgery, light walking is encouraged as tolerated. After your ultrasound test, you can resume your normal activity, but no heavy lifting (do not lift more than 15 pounds) or strenuous exercise for 2 weeks. You should not drive a car until your bandages are removed and you are off all narcotic pain medication. You may ride in a car. DIET: Resume your normal diet. Good nutrition is important for healing of your incision. DRESSINGS/SURGICAL SITE:  When released from the hospital, you should have a compression bandage in place on the operated leg. This bandage should feel snug, but not too tight. If the bandage becomes blood soaked or painfully tight, elevate your leg and call the office ([phone])  You may have surgical glue on your incision sites. There are stitches present under the skin which will absorb on their own. The glue is used to cover the incision, assist in closure, and prevent contamination. This adhesive will darken and peel away on its own within one to two weeks. Do not pick at it. You should shower daily. Wash incisions daily with soap and water, but do not rub or scrub the incisions; rinse thoroughly and pat dry. If the operated leg becomes increasingly painful or swollen, or if there is increasing redness or pain around your incision, contact our office. Some bruising of the skin is common after varicose vein surgery. This can be lessened by elevation of the leg. Many patients will notice some numbness of the shin, ankle, calf, or the top of the foot. This usually improves with time but may be persistent.   After surgery you can expect bruising, swelling and hard knots on your leg. As your body heals the bruising will fade and the swelling and knots will subside. Apply sunscreen with SPF 30 to incisions while sun bathing for up to one year after surgery to reduce the chances of your incisions darkening. FOLLOW UP STUDIES:  Your first post-operative appointment will be 2-3 days after your surgery. At this appointment your bandages will be removed, and you will be seen by a Vascular Surgeon, Nurse Practitioner, or Physician Assistant. An appointment for a follow up Doppler ultrasound study will be scheduled on the same day as your follow up appointment. FOLLOW UP APPOINTMENTS:  Making and keeping follow up appointments and ultrasound tests are important to your recovery. If you have difficulty making it to or keeping your follow up appointments, call the office. If you have increased pain, fever >101.5, increased drainage, redness or a bad smell at your surgery site, new coldness/numbness of your arm or leg, please call us immediately and GO directly to the ER. PLEASE CALL THE OFFICE IF YOU HAVE ANY QUESTIONS  [phone]  [phone] [address] [facility], [address], [facility], [address] [facility]  [address] [facility], [address]  [address], [address] [facility]  [address] [facility], [address] [facility], [facility], [address]  [address] [facility]th [address], 1st Floor, [place], [address]  [address], [address], [address], [place], [address] [facility]  [address], [address] [facility] , [facility], [address] [facility]  15[address] [facility], [address] [facility], [address] [place] [address] [address], [place]  [address]

## 2023-11-13 NOTE — ANESTHESIA POSTPROCEDURE EVALUATION
Post-Op Assessment Note    CV Status:  Stable    Pain management: adequate     Mental Status:  Awake   Hydration Status:  Stable   PONV Controlled:  Controlled   Airway Patency:  Patent      Post Op Vitals Reviewed: Yes      Staff: Anesthesiologist         No notable events documented.     BP      Temp     Pulse     Resp      SpO2      /88   Pulse 66   Temp (!) 97.4 °F (36.3 °C) (Temporal)   Resp 16   Ht 5' 7" (1.702 m)   Wt 82.3 kg (181 lb 7 oz)   SpO2 96%   BMI 28.42 kg/m²

## 2023-11-16 ENCOUNTER — HOSPITAL ENCOUNTER (OUTPATIENT)
Dept: VASCULAR ULTRASOUND | Facility: HOSPITAL | Age: 53
Discharge: HOME | End: 2023-11-16
Attending: SURGERY
Payer: COMMERCIAL

## 2023-11-16 ENCOUNTER — OFFICE VISIT (OUTPATIENT)
Dept: VASCULAR SURGERY | Facility: CLINIC | Age: 53
End: 2023-11-16

## 2023-11-16 VITALS
DIASTOLIC BLOOD PRESSURE: 94 MMHG | BODY MASS INDEX: 29.73 KG/M2 | RESPIRATION RATE: 16 BRPM | HEIGHT: 67 IN | HEART RATE: 72 BPM | WEIGHT: 189.4 LBS | SYSTOLIC BLOOD PRESSURE: 142 MMHG

## 2023-11-16 DIAGNOSIS — I83.891 VARICOSE VEINS OF RIGHT LOWER EXTREMITY WITH OTHER COMPLICATIONS: ICD-10-CM

## 2023-11-16 DIAGNOSIS — I83.891 VARICOSE VEINS OF RIGHT LOWER EXTREMITY WITH OTHER COMPLICATIONS: Primary | ICD-10-CM

## 2023-11-16 PROCEDURE — 93971 EXTREMITY STUDY: CPT

## 2023-11-16 PROCEDURE — 99024 POSTOP FOLLOW-UP VISIT: CPT | Performed by: NURSE PRACTITIONER

## 2023-11-16 NOTE — PATIENT INSTRUCTIONS
-Complete Lower extremity ultrasound and return to the office in ~6 weeks for review with your vascular surgeon.   -Continue activity as tolerated. -Do not take off steri-strips. Allow them to fall off on their own. Wash incision sites daily with soap and water, pat dry. - Call the office if you experience any changes to your legs or feet such as new pain, redness,swelling or fever.  - Stay active. Exercise everyday. Walking is the recommended exercise, keep a log if possible. - Continue use of b/l leg compression. Put on first thing in the morning, wear all day and take off before bed at night. - Use frequent leg elevation.  Try to elevate your legs above the level of your heart 3-4 times a day for 15 min increments.  -When looking at buying compression, look for "gradient compression" with a weight 20-30mmHg (medium weight), knee high is fine.  -Try "[employer]"    -A good brand is [employer]

## 2023-11-16 NOTE — PROGRESS NOTES
Assessment/Plan:    Varicose veins of right lower extremity with other complications  8-LANM old male with a PMHx varicose veins, hx of vein surgery >5 years ago and is now s/p R stab phlebectomy on 11/13/23 by .    -Pt presents without complaints, denies pain at the surgical site. Reports he has been ambulating with difficulty. -Stab phlebectomy are scattered throughout the R upper medial thigh and medial calf. Noted to have mild ecchymosis and swelling surrounding the phlebectomy sites. All steri-strips are intact. No fever, no discharge or purulent drainage.         -Educated patient on pathophysiology of peripheral venous disease and the possibility of varicose vein re-occurrence without use of compression. Pt verbalized understanding. Recommendation  -Complete EVLT ultrasound and return to the office in 6 weeks for f/u with .  -Continue with leg compression and leg elevation daily. RX for compression provided today.  -Activity as tolerated. Encouraged frequent ambulation and exercise.   -Wash incision site daily with soap and water, pat dry. Do not remove steri-strips, allow to fall off on their own. -Call the office with any surgical site swelling, pain, discharge or if you get fever/ chills. Diagnoses and all orders for this visit:    Varicose veins of right lower extremity with other complications  -     Compression Stocking          Subjective:      Patient ID: Jeri Washington is a 47 y.o. male. Patient is s/p Rt EVLT w/ 13 stabs on 11/13/23 by Dr. Barrera Gao. Pt states that he has some itching. Pt denies fevers or chills. Pt denies numbness or tingling. 21-year-old male with a past medical history of varicose veins returns to the office status post right EVLT and stab phlebectomy. Patient presents without complaints, reports that he does not have pain at the surgical incision sites.   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 Reviewed with patient the importance of continued use of compression, he requests new prescription for compression which was provided to him. The following portions of the patient's history were reviewed and updated as appropriate: allergies, current medications, past family history, past medical history, past social history, past surgical history, and problem list.    Review of Systems   Constitutional: Negative. HENT: Negative. Eyes: Negative. Respiratory: Negative. Negative for shortness of breath. Cardiovascular: Negative. Negative for chest pain. Gastrointestinal: Negative. Endocrine: Negative. Genitourinary: Negative. Musculoskeletal: Negative. Skin: Negative. Allergic/Immunologic: Negative. Neurological: Negative. Hematological: Negative. Psychiatric/Behavioral: Negative. Objective:      /94 (BP Location: Right arm, Patient Position: Sitting)   Pulse 72   Resp 16   Ht 5' 7" (1.702 m)   Wt 85.9 kg (189 lb 6.4 oz)   BMI 29.66 kg/m²          Physical Exam  Vitals and nursing note reviewed. Constitutional:       Appearance: Normal appearance. HENT:      Head: Normocephalic and atraumatic. Cardiovascular:      Rate and Rhythm: Normal rate. Pulses: Normal pulses. Heart sounds: Normal heart sounds. Pulmonary:      Effort: Pulmonary effort is normal. No respiratory distress. Breath sounds: Normal breath sounds. Skin:     General: Skin is warm and dry. Neurological:      General: No focal deficit present. Mental Status: He is alert and oriented to person, place, and time. Sensory: No sensory deficit. Gait: Gait normal.   Psychiatric:         Mood and Affect: Mood normal.         Behavior: Behavior normal.         I have reviewed and made appropriate changes to the review of systems input by the medical assistant.     Vitals:    11/16/23 1255   BP: 142/94   BP Location: Right arm   Patient Position: Sitting   Pulse: 72   Resp: 16   Weight: 85.9 kg (189 lb 6.4 oz)   Height: 5' 7" (1.702 m)       Patient Active Problem List   Diagnosis    Hypogonadotropic hypogonadism (HCC)    Erectile dysfunction of non-organic origin    Postoperative hypothyroidism    Hypercholesterolemia    Prostate cancer screening    Family hx of prostate cancer    Exposure to COVID-19 virus    Primary exertional headache    Acute non-recurrent frontal sinusitis    Deviated nasal septum    Physical exam, annual    Rosacea    Plantar fasciitis, left    Varicose veins of right lower extremity with other complications    Nocturia associated with benign prostatic hyperplasia       Past Surgical History:   Procedure Laterality Date    CYST REMOVAL      on back    HERNIA REPAIR Left     inguinal with mesh    KNEE ARTHROSCOPY Right     AL STAB PHLEBT VARICOSE VEINS 1 XTR > 20 INCS Right 2023    Procedure: MULTIPLE STAB PHLEB;  Surgeon: Glyn Litten, DO;  Location: [facility];  Service: Vascular    SHOULDER ARTHROSCOPY Bilateral     TOTAL THYROIDECTOMY      WISDOM TOOTH EXTRACTION         Family History   Problem Relation Age of Onset    Prostate cancer Father         Prostate removed    Hypertension Father         on meds    Diabetes Maternal Grandfather     Prostate cancer Paternal Grandfather        Social History     Socioeconomic History    Marital status: /Civil Union     Spouse name: Not on file    Number of children: Not on file    Years of education: Not on file    Highest education level: Not on file   Occupational History    Not on file   Tobacco Use    Smoking status: Former     Types: Cigarettes     Quit date:      Years since quittin.8    Smokeless tobacco: Former     Types: Chew     Quit date: 2002   Vaping Use    Vaping Use: Never used   Substance and Sexual Activity    Alcohol use: Yes     Alcohol/week: 0.0 standard drinks of alcohol     Comment: social    Drug use:  No Sexual activity: Yes     Partners: Female     Comment: , vasectomy   Other Topics Concern    Not on file   Social History Narrative    Not on file     Social Determinants of Health     Financial Resource Strain: Not on file   Food Insecurity: Not on file   Transportation Needs: Not on file   Physical Activity: Not on file   Stress: Not on file   Social Connections: Not on file   Intimate Partner Violence: Not on file   Housing Stability: Not on file       No Known Allergies      Current Outpatient Medications:     Ascorbic Acid (VITAMIN C) 1000 MG tablet, Take 1,000 mg by mouth daily, Disp: , Rfl:     Cholecalciferol (VITAMIN D) 2000 units CAPS, Take 1 capsule by mouth daily, Disp: , Rfl:     clomiPHENE (Clomid) 50 mg tablet, TAKE 1/2 TABLET BY MOUTH EVERY OTHER DAY (Patient taking differently: every evening TAKE 1/2 TABLET BY MOUTH EVERY OTHER DAY), Disp: 23 tablet, Rfl: 2    Magnesium 100 MG TABS, Take by mouth daily, Disp: , Rfl:     metroNIDAZOLE (METROCREAM) 0.75 % cream, Apply topically 2 (two) times a day (Patient taking differently: Apply topically 2 (two) times a day as needed), Disp: 45 g, Rfl: 3    Multiple Vitamins-Minerals (ZINC PO), Take by mouth daily, Disp: , Rfl:     Omega-3 Fatty Acids (FISH OIL) 645 MG CAPS, Take by mouth daily, Disp: , Rfl:     Potassium 99 MG TABS, Take by mouth daily, Disp: , Rfl:     Synthroid 150 MCG tablet, Take by mouth 150 mcg 6x week and extra 75 mcg on Saturdays, Disp: , Rfl:     butalbital-acetaminophen-caffeine (FIORICET,ESGIC) -40 mg per tablet, Take 1 tablet by mouth every 4 (four) hours as needed for headaches (Patient not taking: Reported on 11/16/2023), Disp: 20 tablet, Rfl: 0    oxyCODONE (Roxicodone) 5 immediate release tablet, Take 1 tablet (5 mg total) by mouth every 4 (four) hours as needed for moderate pain for up to 10 days Max Daily Amount: 30 mg (Patient not taking: Reported on 11/16/2023), Disp: 12 tablet, Rfl: 0  I have spent a total time of 25 minutes on 11/16/23 in caring for this patient including Instructions for management, Patient and family education, Importance of tx compliance, Risk factor reductions, and Documenting in the medical record.

## 2023-11-17 PROCEDURE — 93971 EXTREMITY STUDY: CPT | Performed by: SURGERY

## 2023-11-17 NOTE — ASSESSMENT & PLAN NOTE
54-year old male with a PMHx varicose veins, hx of vein surgery >5 years ago and is now s/p R stab phlebectomy on 11/13/23 by .    -Pt presents without complaints, denies pain at the surgical site. Reports he has been ambulating with difficulty. -Stab phlebectomy are scattered throughout the R upper medial thigh and medial calf. Noted to have mild ecchymosis and swelling surrounding the phlebectomy sites. All steri-strips are intact. No fever, no discharge or purulent drainage.         -Educated patient on pathophysiology of peripheral venous disease and the possibility of varicose vein re-occurrence without use of compression. Pt verbalized understanding. Recommendation  -Complete EVLT ultrasound and return to the office in 6 weeks for f/u with .  -Continue with leg compression and leg elevation daily. RX for compression provided today.  -Activity as tolerated. Encouraged frequent ambulation and exercise.   -Wash incision site daily with soap and water, pat dry. Do not remove steri-strips, allow to fall off on their own. -Call the office with any surgical site swelling, pain, discharge or if you get fever/ chills.

## 2024-01-09 ENCOUNTER — OFFICE VISIT (OUTPATIENT)
Dept: VASCULAR SURGERY | Facility: CLINIC | Age: 54
End: 2024-01-09

## 2024-01-09 VITALS
SYSTOLIC BLOOD PRESSURE: 130 MMHG | HEIGHT: 67 IN | WEIGHT: 187 LBS | BODY MASS INDEX: 29.35 KG/M2 | HEART RATE: 87 BPM | DIASTOLIC BLOOD PRESSURE: 82 MMHG | OXYGEN SATURATION: 95 %

## 2024-01-09 DIAGNOSIS — I83.891 VARICOSE VEINS OF RIGHT LOWER EXTREMITY WITH OTHER COMPLICATIONS: Primary | ICD-10-CM

## 2024-01-09 PROCEDURE — 99024 POSTOP FOLLOW-UP VISIT: CPT | Performed by: SURGERY

## 2024-01-09 NOTE — ASSESSMENT & PLAN NOTE
Status post right lower extremity microphlebectomy's on 11/13/2023.  Patient returns for routine scheduled office visit.  His phlebectomy sites have healed nicely.  He has returned to running and doing so without significant discomfort.  Recommend continue compression stockings especially on prolonged car rides/flights.  Otherwise no restrictions from a vascular standpoint.  Patient may follow-up on an as-needed basis.

## 2024-01-09 NOTE — PROGRESS NOTES
"Assessment/Plan:    Varicose veins of right lower extremity with other complications  Status post right lower extremity microphlebectomy's on 11/13/2023.  Patient returns for routine scheduled office visit.  His phlebectomy sites have healed nicely.  He has returned to running and doing so without significant discomfort.  Recommend continue compression stockings especially on prolonged car rides/flights.  Otherwise no restrictions from a vascular standpoint.  Patient may follow-up on an as-needed basis.       Diagnoses and all orders for this visit:    Varicose veins of right lower extremity with other complications    Other orders  -     Zn-Pyg Afri-Nettle-Saw Palmet (SAW PALMETTO COMPLEX PO); Take by mouth          Subjective:      Patient ID: Varun Lopez is a 53 y.o. male.    Patient is s/p EVLT done 11/13/2023. He denies any fever, chills, pain or complaints. He does wear compression socks.     Varun returns to the office for routine office visit status post right lower extremity microphlebectomy's on 11/13/2023.  He offers no complaints.  He has returned to running.        The following portions of the patient's history were reviewed and updated as appropriate: allergies, current medications, past family history, past medical history, past social history, past surgical history, and problem list.    Review of Systems   Constitutional: Negative.    HENT: Negative.     Eyes: Negative.    Respiratory: Negative.     Cardiovascular: Negative.    Gastrointestinal: Negative.    Endocrine: Negative.    Genitourinary: Negative.    Musculoskeletal: Negative.    Skin: Negative.    Allergic/Immunologic: Negative.    Neurological: Negative.    Hematological: Negative.    Psychiatric/Behavioral: Negative.           Objective:      /82 (BP Location: Right arm, Patient Position: Sitting, Cuff Size: Standard)   Pulse 87   Ht 5' 7\" (1.702 m)   Wt 84.8 kg (187 lb)   SpO2 95%   BMI 29.29 kg/m²          Physical " Exam  Constitutional:       General: He is not in acute distress.     Appearance: He is not ill-appearing, toxic-appearing or diaphoretic.   HENT:      Head: Normocephalic and atraumatic.      Right Ear: External ear normal.      Left Ear: External ear normal.   Eyes:      General: No scleral icterus.        Right eye: No discharge.         Left eye: No discharge.   Cardiovascular:      Rate and Rhythm: Normal rate.   Pulmonary:      Effort: Pulmonary effort is normal.   Skin:     General: Skin is warm and dry.   Neurological:      Mental Status: He is alert.   Psychiatric:         Mood and Affect: Mood normal.         Behavior: Behavior normal.         Thought Content: Thought content normal.         Judgment: Judgment normal.           RLE microphlebctomy sites well healed  No edema

## 2024-02-21 PROBLEM — J01.10 ACUTE NON-RECURRENT FRONTAL SINUSITIS: Status: RESOLVED | Noted: 2020-09-03 | Resolved: 2024-02-21

## 2024-02-21 PROBLEM — Z12.5 PROSTATE CANCER SCREENING: Status: RESOLVED | Noted: 2019-08-27 | Resolved: 2024-02-21

## 2024-02-22 ENCOUNTER — HOSPITAL ENCOUNTER (EMERGENCY)
Facility: HOSPITAL | Age: 54
Discharge: HOME | End: 2024-02-22
Attending: EMERGENCY MEDICINE
Payer: COMMERCIAL

## 2024-02-22 ENCOUNTER — PATIENT MESSAGE (OUTPATIENT)
Dept: FAMILY MEDICINE CLINIC | Facility: CLINIC | Age: 54
End: 2024-02-22

## 2024-02-22 ENCOUNTER — NURSE TRIAGE (OUTPATIENT)
Age: 54
End: 2024-02-22

## 2024-02-22 VITALS
RESPIRATION RATE: 18 BRPM | DIASTOLIC BLOOD PRESSURE: 87 MMHG | BODY MASS INDEX: 29.87 KG/M2 | TEMPERATURE: 98.2 F | OXYGEN SATURATION: 95 % | HEART RATE: 79 BPM | SYSTOLIC BLOOD PRESSURE: 138 MMHG | WEIGHT: 190.7 LBS

## 2024-02-22 DIAGNOSIS — R07.9 CHEST PAIN, UNSPECIFIED TYPE: ICD-10-CM

## 2024-02-22 DIAGNOSIS — R00.2 PALPITATIONS: Primary | ICD-10-CM

## 2024-02-22 DIAGNOSIS — R00.2 INTERMITTENT PALPITATIONS: ICD-10-CM

## 2024-02-22 LAB
2HR DELTA HS TROPONIN: 0 NG/L
ALBUMIN SERPL BCP-MCNC: 4.4 G/DL (ref 3.5–5)
ALP SERPL-CCNC: 32 U/L (ref 34–104)
ALT SERPL W P-5'-P-CCNC: 18 U/L (ref 7–52)
ANION GAP SERPL CALCULATED.3IONS-SCNC: 7 MMOL/L
AST SERPL W P-5'-P-CCNC: 15 U/L (ref 13–39)
ATRIAL RATE: 67 BPM
BASOPHILS # BLD AUTO: 0.04 THOUSANDS/ÂΜL (ref 0–0.1)
BASOPHILS NFR BLD AUTO: 1 % (ref 0–1)
BILIRUB SERPL-MCNC: 0.52 MG/DL (ref 0.2–1)
BUN SERPL-MCNC: 18 MG/DL (ref 5–25)
CALCIUM SERPL-MCNC: 9.3 MG/DL (ref 8.4–10.2)
CARDIAC TROPONIN I PNL SERPL HS: 4 NG/L
CARDIAC TROPONIN I PNL SERPL HS: 4 NG/L
CHLORIDE SERPL-SCNC: 108 MMOL/L (ref 96–108)
CO2 SERPL-SCNC: 25 MMOL/L (ref 21–32)
CREAT SERPL-MCNC: 0.86 MG/DL (ref 0.6–1.3)
EOSINOPHIL # BLD AUTO: 0.17 THOUSAND/ÂΜL (ref 0–0.61)
EOSINOPHIL NFR BLD AUTO: 4 % (ref 0–6)
ERYTHROCYTE [DISTWIDTH] IN BLOOD BY AUTOMATED COUNT: 12.2 % (ref 11.6–15.1)
GFR SERPL CREATININE-BSD FRML MDRD: 99 ML/MIN/1.73SQ M
GLUCOSE SERPL-MCNC: 109 MG/DL (ref 65–140)
HCT VFR BLD AUTO: 41.8 % (ref 36.5–49.3)
HGB BLD-MCNC: 14.4 G/DL (ref 12–17)
IMM GRANULOCYTES # BLD AUTO: 0.02 THOUSAND/UL (ref 0–0.2)
IMM GRANULOCYTES NFR BLD AUTO: 1 % (ref 0–2)
LYMPHOCYTES # BLD AUTO: 1.44 THOUSANDS/ÂΜL (ref 0.6–4.47)
LYMPHOCYTES NFR BLD AUTO: 33 % (ref 14–44)
MCH RBC QN AUTO: 29.1 PG (ref 26.8–34.3)
MCHC RBC AUTO-ENTMCNC: 34.4 G/DL (ref 31.4–37.4)
MCV RBC AUTO: 85 FL (ref 82–98)
MONOCYTES # BLD AUTO: 0.38 THOUSAND/ÂΜL (ref 0.17–1.22)
MONOCYTES NFR BLD AUTO: 9 % (ref 4–12)
NEUTROPHILS # BLD AUTO: 2.37 THOUSANDS/ÂΜL (ref 1.85–7.62)
NEUTS SEG NFR BLD AUTO: 52 % (ref 43–75)
NRBC BLD AUTO-RTO: 0 /100 WBCS
P AXIS: 63 DEGREES
PLATELET # BLD AUTO: 199 THOUSANDS/UL (ref 149–390)
PMV BLD AUTO: 9.9 FL (ref 8.9–12.7)
POTASSIUM SERPL-SCNC: 4.3 MMOL/L (ref 3.5–5.3)
PR INTERVAL: 160 MS
PROT SERPL-MCNC: 6.4 G/DL (ref 6.4–8.4)
QRS AXIS: 12 DEGREES
QRSD INTERVAL: 94 MS
QT INTERVAL: 398 MS
QTC INTERVAL: 420 MS
RBC # BLD AUTO: 4.94 MILLION/UL (ref 3.88–5.62)
SODIUM SERPL-SCNC: 140 MMOL/L (ref 135–147)
T WAVE AXIS: 17 DEGREES
VENTRICULAR RATE: 67 BPM
WBC # BLD AUTO: 4.42 THOUSAND/UL (ref 4.31–10.16)

## 2024-02-22 PROCEDURE — 80053 COMPREHEN METABOLIC PANEL: CPT | Performed by: EMERGENCY MEDICINE

## 2024-02-22 PROCEDURE — 99285 EMERGENCY DEPT VISIT HI MDM: CPT | Performed by: EMERGENCY MEDICINE

## 2024-02-22 PROCEDURE — 85025 COMPLETE CBC W/AUTO DIFF WBC: CPT | Performed by: EMERGENCY MEDICINE

## 2024-02-22 PROCEDURE — 93005 ELECTROCARDIOGRAM TRACING: CPT

## 2024-02-22 PROCEDURE — 84484 ASSAY OF TROPONIN QUANT: CPT | Performed by: EMERGENCY MEDICINE

## 2024-02-22 PROCEDURE — 36415 COLL VENOUS BLD VENIPUNCTURE: CPT | Performed by: EMERGENCY MEDICINE

## 2024-02-22 PROCEDURE — 93010 ELECTROCARDIOGRAM REPORT: CPT | Performed by: INTERNAL MEDICINE

## 2024-02-22 PROCEDURE — 99285 EMERGENCY DEPT VISIT HI MDM: CPT

## 2024-02-22 NOTE — ED PROVIDER NOTES
History  Chief Complaint   Patient presents with    Palpitations     Pt reports palpitations since yesterday, apple watch found afib with rate of 70. Pt denies cp/sob.            Very pleasant 53-year-old male, presents with episode of palpitations, rapid heart rate, was told by his Apple Watch that he was in rapid A-fib..  This occurred earlier today, but 2 hours prior to arrival he had an episode of tightness in the center of his chest, no associated shortness of breath no diaphoresis.  No fevers no chills no nausea no vomiting no falls no injury no trauma, no excessive caffeine use.,  Does occasionally drink alcohol but did not drink alcohol the night before and did not drink any alcohol today.,  Says that his father had a stent placed in his early 50s, patient himself had a cardiac workup over a decade ago which was unremarkable but he feels overdue to have 1.      Palpitations  Associated symptoms: no chest pain, no cough, no diaphoresis, no dizziness, no nausea, no numbness, no shortness of breath and no vomiting        Prior to Admission Medications   Prescriptions Last Dose Informant Patient Reported? Taking?   Ascorbic Acid (VITAMIN C) 1000 MG tablet  Self Yes No   Sig: Take 1,000 mg by mouth daily   Cholecalciferol (VITAMIN D) 2000 units CAPS  Self Yes No   Sig: Take 1 capsule by mouth daily   Magnesium 100 MG TABS  Self Yes No   Sig: Take by mouth daily   Multiple Vitamins-Minerals (ZINC PO)  Self Yes No   Sig: Take by mouth daily   Omega-3 Fatty Acids (FISH OIL) 645 MG CAPS  Self Yes No   Sig: Take by mouth daily   Potassium 99 MG TABS  Self Yes No   Sig: Take by mouth daily   Synthroid 150 MCG tablet  Self Yes No   Sig: Take by mouth 150 mcg 6x week and extra 75 mcg on Saturdays   Zn-Pyg Afri-Nettle-Saw Palmet (SAW PALMETTO COMPLEX PO)   Yes No   Sig: Take by mouth   butalbital-acetaminophen-caffeine (FIORICET,ESGIC) -40 mg per tablet  Self No No   Sig: Take 1 tablet by mouth every 4 (four) hours  as needed for headaches   Patient not taking: Reported on 2023   clomiPHENE (Clomid) 50 mg tablet  Self No No   Sig: TAKE 1/2 TABLET BY MOUTH EVERY OTHER DAY   Patient taking differently: every evening TAKE 1/2 TABLET BY MOUTH EVERY OTHER DAY   metroNIDAZOLE (METROCREAM) 0.75 % cream  Self No No   Sig: Apply topically 2 (two) times a day   Patient taking differently: Apply topically 2 (two) times a day as needed      Facility-Administered Medications: None       Past Medical History:   Diagnosis Date    Papillary thyroid carcinoma (HCC) 2018    had total thyroidectomy    Recent URI     pt states is better - cough in am then subsides    Rosacea     Seasonal allergies     Varicose veins of right lower extremity        Past Surgical History:   Procedure Laterality Date    CYST REMOVAL      on back    HERNIA REPAIR Left     inguinal with mesh    KNEE ARTHROSCOPY Right     ND STAB PHLEBT VARICOSE VEINS 1 XTR > 20 INCS Right 2023    Procedure: MULTIPLE STAB PHLEB;  Surgeon: Teddy Roberts DO;  Location: [facility];  Service: Vascular    SHOULDER ARTHROSCOPY Bilateral     TOTAL THYROIDECTOMY      WISDOM TOOTH EXTRACTION         Family History   Problem Relation Age of Onset    Prostate cancer Father         Prostate removed    Hypertension Father         on meds    Diabetes Maternal Grandfather     Prostate cancer Paternal Grandfather      I have reviewed and agree with the history as documented.    E-Cigarette/Vaping    E-Cigarette Use Never User      E-Cigarette/Vaping Substances    Nicotine No     THC No     CBD No     Flavoring No     Other No     Unknown No      Social History     Tobacco Use    Smoking status: Former     Current packs/day: 0.00     Types: Cigarettes     Quit date:      Years since quittin.1    Smokeless tobacco: Former     Types: Chew     Quit date: 2002   Vaping Use    Vaping status: Never Used   Substance Use Topics    Alcohol use: Yes     Alcohol/week: 0.0  standard drinks of alcohol     Comment: social    Drug use: No       Review of Systems   Constitutional:  Negative for activity change, chills, diaphoresis and fever.   HENT:  Negative for congestion, sinus pressure and sore throat.    Eyes:  Negative for pain and visual disturbance.   Respiratory:  Positive for chest tightness. Negative for cough, shortness of breath, wheezing and stridor.    Cardiovascular:  Positive for palpitations. Negative for chest pain.   Gastrointestinal:  Negative for abdominal distention, abdominal pain, constipation, diarrhea, nausea and vomiting.   Genitourinary:  Negative for dysuria and frequency.   Musculoskeletal:  Negative for neck pain and neck stiffness.   Skin:  Negative for rash.   Neurological:  Negative for dizziness, speech difficulty, light-headedness, numbness and headaches.       Physical Exam  Physical Exam  Vitals reviewed.   Constitutional:       General: He is not in acute distress.     Appearance: He is well-developed. He is not diaphoretic.      Comments: Sinus on the cardiac monitor with ventricular response in the 80s   HENT:      Head: Normocephalic and atraumatic.      Right Ear: External ear normal.      Left Ear: External ear normal.      Nose: Nose normal.   Eyes:      General:         Right eye: No discharge.         Left eye: No discharge.      Pupils: Pupils are equal, round, and reactive to light.   Neck:      Trachea: No tracheal deviation.   Cardiovascular:      Rate and Rhythm: Normal rate and regular rhythm.      Heart sounds: Normal heart sounds. No murmur heard.  Pulmonary:      Effort: Pulmonary effort is normal. No respiratory distress.      Breath sounds: Normal breath sounds. No stridor.   Abdominal:      General: There is no distension.      Palpations: Abdomen is soft.      Tenderness: There is no abdominal tenderness. There is no guarding or rebound.   Musculoskeletal:         General: Normal range of motion.      Cervical back: Normal range  of motion and neck supple.   Skin:     General: Skin is warm and dry.      Coloration: Skin is not pale.      Findings: No erythema.   Neurological:      General: No focal deficit present.      Mental Status: He is alert and oriented to person, place, and time.         Vital Signs  ED Triage Vitals [02/22/24 0944]   Temperature Pulse Respirations Blood Pressure SpO2   98.2 °F (36.8 °C) 77 16 (!) 173/87 98 %      Temp Source Heart Rate Source Patient Position - Orthostatic VS BP Location FiO2 (%)   Oral Monitor Sitting Right arm --      Pain Score       No Pain           Vitals:    02/22/24 0944 02/22/24 1200   BP: (!) 173/87 138/87   Pulse: 77 79   Patient Position - Orthostatic VS: Sitting Sitting         Visual Acuity      ED Medications  Medications - No data to display    Diagnostic Studies  Results Reviewed       Procedure Component Value Units Date/Time    HS Troponin I 2hr [[id number]]  (Normal) Collected: 02/22/24 1305    Lab Status: Final result Specimen: Blood from Arm, Right Updated: 02/22/24 1333     hs TnI 2hr 4 ng/L      Delta 2hr hsTnI 0 ng/L     HS Troponin I 4hr [[id number]]     Lab Status: No result Specimen: Blood     HS Troponin 0hr (reflex protocol) [[id number]]  (Normal) Collected: 02/22/24 1033    Lab Status: Final result Specimen: Blood from Arm, Right Updated: 02/22/24 1113     hs TnI 0hr 4 ng/L     Comprehensive metabolic panel [[id number]]  (Abnormal) Collected: 02/22/24 1033    Lab Status: Final result Specimen: Blood from Arm, Right Updated: 02/22/24 1108     Sodium 140 mmol/L      Potassium 4.3 mmol/L      Chloride 108 mmol/L      CO2 25 mmol/L      ANION GAP 7 mmol/L      BUN 18 mg/dL      Creatinine 0.86 mg/dL      Glucose 109 mg/dL      Calcium 9.3 mg/dL      AST 15 U/L      ALT 18 U/L      Alkaline Phosphatase 32 U/L      Total Protein 6.4 g/dL      Albumin 4.4 g/dL      Total Bilirubin 0.52 mg/dL      eGFR 99 ml/min/1.73sq m     Narrative:      [employer]  guidelines for Chronic Kidney Disease (CKD):     Stage 1 with normal or high GFR (GFR > 90 mL/min/1.73 square meters)    Stage 2 Mild CKD (GFR = 60-89 mL/min/1.73 square meters)    Stage 3A Moderate CKD (GFR = 45-59 mL/min/1.73 square meters)    Stage 3B Moderate CKD (GFR = 30-44 mL/min/1.73 square meters)    Stage 4 Severe CKD (GFR = 15-29 mL/min/1.73 square meters)    Stage 5 End Stage CKD (GFR <15 mL/min/1.73 square meters)  Note: GFR calculation is accurate only with a steady state creatinine    CBC and differential [[id number]] Collected: 02/22/24 1033    Lab Status: Final result Specimen: Blood from Arm, Right Updated: 02/22/24 1045     WBC 4.42 Thousand/uL      RBC 4.94 Million/uL      Hemoglobin 14.4 g/dL      Hematocrit 41.8 %      MCV 85 fL      MCH 29.1 pg      MCHC 34.4 g/dL      RDW 12.2 %      MPV 9.9 fL      Platelets 199 Thousands/uL      nRBC 0 /100 WBCs      Neutrophils Relative 52 %      Immat GRANS % 1 %      Lymphocytes Relative 33 %      Monocytes Relative 9 %      Eosinophils Relative 4 %      Basophils Relative 1 %      Neutrophils Absolute 2.37 Thousands/µL      Immature Grans Absolute 0.02 Thousand/uL      Lymphocytes Absolute 1.44 Thousands/µL      Monocytes Absolute 0.38 Thousand/µL      Eosinophils Absolute 0.17 Thousand/µL      Basophils Absolute 0.04 Thousands/µL                    No orders to display              Procedures  ECG 12 Lead Documentation Only    Date/Time: 2/22/2024 10:01 AM    Performed by: Elroy Morfin DO  Authorized by: Elroy Morfin DO    ECG reviewed by me, the ED Provider: yes    Patient location:  ED  Interpretation:     Interpretation: normal    Rate:     ECG rate:  67    ECG rate assessment: normal    Rhythm:     Rhythm: sinus rhythm    Ectopy:     Ectopy: none    QRS:     QRS axis:  Normal    QRS intervals:  Normal  Conduction:     Conduction: normal    ST segments:     ST segments:  Normal  T waves:     T waves: normal             ED Course  ED  Course as of 02/22/24 1357   Thu Feb 22, 2024   1346 Personally reviewed cardiac monitor no events or episodes of atrial fibrillation while here in the emergency department, will discharge, follow with outpatient cardiology for consideration of Holter monitor or other similar cardiac monitoring device patient understands and agrees to discharge plan             HEART Risk Score      Flowsheet Row Most Recent Value   Heart Score Risk Calculator    History 1 Filed at: 02/22/2024 1356   ECG 1 Filed at: 02/22/2024 1356   Age 1 Filed at: 02/22/2024 1356   Risk Factors 1 Filed at: 02/22/2024 1356   Troponin 0 Filed at: 02/22/2024 1356   HEART Score 4 Filed at: 02/22/2024 1356                                        Medical Decision Making        Initial ED assessment:     53-year-old male, presents with heart palpitations, Apple Watch said he was in atrial fibrillation.,  Has been in sinus rhythm on EKG and during my initial evaluation in the ED    Initial DDx includes but is not limited to:   Paroxysmal atrial fibrillation, PVCs, sinus tachycardia.  As he had this episode of chest pressure after possibly ACS although I feel this is less likely    Initial ED plan:   Blood work including troponins, will check delta troponins this occurred an hour prior to arrival        Final ED summary/disposition:   After evaluation and workup in the emergency department, blood work unremarkable no events on cardiac monitor patient discharged    Amount and/or Complexity of Data Reviewed  Labs: ordered.             Disposition  Final diagnoses:   Palpitations   Intermittent palpitations   Chest pain, unspecified type     Time reflects when diagnosis was documented in both MDM as applicable and the Disposition within this note       Time User Action Codes Description Comment    2/22/2024  1:44 PM Elroy Morfin Add [R00.2] Palpitations     2/22/2024  1:44 PM Elroy Morfin Add [R00.2] Intermittent palpitations     2/22/2024  1:57 PM  Elroy Morifn Add [R07.9] Chest pain, unspecified type           ED Disposition       ED Disposition   Discharge    Condition   Stable    Date/Time   Thu Feb 22, 2024 1344    Comment   Varun Lopez discharge to home/self care.                   Follow-up Information       Follow up With Specialties Details Why Contact Info Additional Information    Jose Mccarthy DO Family Medicine Call in 1 day To arrange for the next available appointment [address] [facility] [address]  [phone]       [facility] Cardiology Call in 1 day To arrange for the next available appointment [address] [facility]  [address]  [facility] [address] [phone] [facility], [address] [place] [address], [place], [place], [address] [phone]            Patient's Medications   Discharge Prescriptions    No medications on file       No discharge procedures on file.    PDMP Review       None            ED Provider  Electronically Signed by             Elroy Morfin DO  02/22/24 0638

## 2024-02-22 NOTE — TELEPHONE ENCOUNTER
"Reason for Disposition   Chest pain lasting longer than 5 minutes and occurred in last 3 days (72 hours) (Exception: feels exactly the same as previously diagnosed heartburn and has accompanying sour taste in mouth)    Answer Assessment - Initial Assessment Questions  1. LOCATION: \"Where does it hurt?\"        Chest pressure   2. RADIATION: \"Does the pain go anywhere else?\" (e.g., into neck, jaw, arms, back)      Denies   3. ONSET: \"When did the chest pain begin?\" (Minutes, hours or days)       Had episode in Jan and then this morning   4. PATTERN \"Does the pain come and go, or has it been constant since it started?\"  \"Does it get worse with exertion?\"       intermittent  5. DURATION: \"How long does it last\" (e.g., seconds, minutes, hours)      minutes  6. SEVERITY: \"How bad is the pain?\"  (e.g., Scale 1-10; mild, moderate, or severe)     - MILD (1-3): doesn't interfere with normal activities      - MODERATE (4-7): interferes with normal activities or awakens from sleep     - SEVERE (8-10): excruciating pain, unable to do any normal activities        Mild to moderate  7. CARDIAC RISK FACTORS: \"Do you have any history of heart problems or risk factors for heart disease?\" (e.g., angina, prior heart attack; diabetes, high blood pressure, high cholesterol, smoker, or strong family history of heart disease)      Father- heart attack. Patient mild elevation in blood pressure her reports  8. PULMONARY RISK FACTORS: \"Do you have any history of lung disease?\"  (e.g., blood clots in lung, asthma, emphysema, birth control pills)      denies  9. CAUSE: \"What do you think is causing the chest pain?\"      Per patient, his watch is saying he could have A-Fib  10. OTHER SYMPTOMS: \"Do you have any other symptoms?\" (e.g., dizziness, nausea, vomiting, sweating, fever, difficulty breathing, cough)        lightheadedness  11. PREGNANCY: \"Is there any chance you are pregnant?\" \"When was your last menstrual period?\"        N/a    Protocols " used: Chest Pain-ADULT-[place]

## 2024-03-21 LAB
25(OH)D3 SERPL-MCNC: 90 NG/ML (ref 30–100)
ALBUMIN SERPL-MCNC: 4.5 G/DL (ref 3.6–5.1)
ALBUMIN/GLOB SERPL: 2.1 (CALC) (ref 1–2.5)
ALP SERPL-CCNC: 35 U/L (ref 35–144)
ALT SERPL-CCNC: 21 U/L (ref 9–46)
AST SERPL-CCNC: 18 U/L (ref 10–35)
BASOPHILS # BLD AUTO: 30 CELLS/UL (ref 0–200)
BASOPHILS NFR BLD AUTO: 0.7 %
BILIRUB SERPL-MCNC: 0.6 MG/DL (ref 0.2–1.2)
BUN SERPL-MCNC: 18 MG/DL (ref 7–25)
BUN/CREAT SERPL: NORMAL (CALC) (ref 6–22)
CALCIUM SERPL-MCNC: 9.3 MG/DL (ref 8.6–10.3)
CHLORIDE SERPL-SCNC: 106 MMOL/L (ref 98–110)
CHOLEST SERPL-MCNC: 201 MG/DL
CHOLEST/HDLC SERPL: 4.3 (CALC)
CO2 SERPL-SCNC: 25 MMOL/L (ref 20–32)
CREAT SERPL-MCNC: 0.86 MG/DL (ref 0.7–1.3)
EOSINOPHIL # BLD AUTO: 202 CELLS/UL (ref 15–500)
EOSINOPHIL NFR BLD AUTO: 4.7 %
ERYTHROCYTE [DISTWIDTH] IN BLOOD BY AUTOMATED COUNT: 12.7 % (ref 11–15)
GFR/BSA.PRED SERPLBLD CYS-BASED-ARV: 104 ML/MIN/1.73M2
GLOBULIN SER CALC-MCNC: 2.1 G/DL (CALC) (ref 1.9–3.7)
GLUCOSE SERPL-MCNC: 92 MG/DL (ref 65–99)
HCT VFR BLD AUTO: 44.4 % (ref 38.5–50)
HDLC SERPL-MCNC: 47 MG/DL
HGB BLD-MCNC: 15.1 G/DL (ref 13.2–17.1)
LDLC SERPL CALC-MCNC: 127 MG/DL (CALC)
LYMPHOCYTES # BLD AUTO: 1578 CELLS/UL (ref 850–3900)
LYMPHOCYTES NFR BLD AUTO: 36.7 %
MCH RBC QN AUTO: 29.2 PG (ref 27–33)
MCHC RBC AUTO-ENTMCNC: 34 G/DL (ref 32–36)
MCV RBC AUTO: 85.9 FL (ref 80–100)
MONOCYTES # BLD AUTO: 292 CELLS/UL (ref 200–950)
MONOCYTES NFR BLD AUTO: 6.8 %
NEUTROPHILS # BLD AUTO: 2197 CELLS/UL (ref 1500–7800)
NEUTROPHILS NFR BLD AUTO: 51.1 %
NONHDLC SERPL-MCNC: 154 MG/DL (CALC)
PLATELET # BLD AUTO: 232 THOUSAND/UL (ref 140–400)
PMV BLD REES-ECKER: 10.8 FL (ref 7.5–12.5)
POTASSIUM SERPL-SCNC: 4.1 MMOL/L (ref 3.5–5.3)
PROT SERPL-MCNC: 6.6 G/DL (ref 6.1–8.1)
PSA SERPL-MCNC: 0.72 NG/ML
RBC # BLD AUTO: 5.17 MILLION/UL (ref 4.2–5.8)
SODIUM SERPL-SCNC: 140 MMOL/L (ref 135–146)
T4 FREE SERPL-MCNC: 1.6 NG/DL (ref 0.8–1.8)
TESTOST FREE SERPL-MCNC: 66.7 PG/ML (ref 35–155)
TESTOST SERPL-MCNC: 606 NG/DL (ref 250–1100)
TRIGL SERPL-MCNC: 158 MG/DL
TSH SERPL-ACNC: 0.29 MIU/L (ref 0.4–4.5)
WBC # BLD AUTO: 4.3 THOUSAND/UL (ref 3.8–10.8)

## 2024-03-26 ENCOUNTER — RA CDI HCC (OUTPATIENT)
Dept: OTHER | Facility: HOSPITAL | Age: 54
End: 2024-03-26

## 2024-04-01 ENCOUNTER — OFFICE VISIT (OUTPATIENT)
Dept: FAMILY MEDICINE CLINIC | Facility: CLINIC | Age: 54
End: 2024-04-01
Payer: COMMERCIAL

## 2024-04-01 VITALS
RESPIRATION RATE: 16 BRPM | WEIGHT: 185 LBS | BODY MASS INDEX: 29.03 KG/M2 | OXYGEN SATURATION: 98 % | SYSTOLIC BLOOD PRESSURE: 124 MMHG | HEIGHT: 67 IN | DIASTOLIC BLOOD PRESSURE: 68 MMHG | HEART RATE: 70 BPM

## 2024-04-01 DIAGNOSIS — Z00.00 PHYSICAL EXAM, ANNUAL: ICD-10-CM

## 2024-04-01 DIAGNOSIS — Z12.5 PROSTATE CANCER SCREENING: ICD-10-CM

## 2024-04-01 DIAGNOSIS — E78.00 HYPERCHOLESTEROLEMIA: ICD-10-CM

## 2024-04-01 DIAGNOSIS — E23.0 HYPOGONADOTROPIC HYPOGONADISM (HCC): ICD-10-CM

## 2024-04-01 DIAGNOSIS — E89.0 POSTOPERATIVE HYPOTHYROIDISM: ICD-10-CM

## 2024-04-01 DIAGNOSIS — Z23 ENCOUNTER FOR IMMUNIZATION: Primary | ICD-10-CM

## 2024-04-01 PROBLEM — Z20.822 EXPOSURE TO COVID-19 VIRUS: Status: RESOLVED | Noted: 2020-05-08 | Resolved: 2024-04-01

## 2024-04-01 PROCEDURE — 99396 PREV VISIT EST AGE 40-64: CPT | Performed by: FAMILY MEDICINE

## 2024-04-01 NOTE — ASSESSMENT & PLAN NOTE
Skin the patient is slightly overcorrected.  It is managed by endocrinologist.  It is possible that his extra dose of Synthroid could have been causing him to have palpitations since he is also lost weight since he started running

## 2024-04-01 NOTE — ASSESSMENT & PLAN NOTE
Annual physical examination performed    -Offered patient Adacel booster which he declined at this time.  If he ever develops a dirty cut or puncture wound then he should receive tetanus booster

## 2024-04-01 NOTE — PROGRESS NOTES
Subjective:      Patient ID: Varun Lopez is a 54 y.o. male.    54-year-old male presents for annual physical examination.  Patient does have prior history of papillary thyroid carcinoma status post thyroidectomy.  Patient does follow up with [place]ocrin[place].  Patient does have history of hypogonadotropic hypogonadism and remains on clomiphene half tablet every other day.  Patient did have vein stripping performed by vascular surgeon.  Recently he did have episode of palpitations.  His smart watch told him that he was having atrial fibrillation.  Was seen at ER where they did not find that.  He was recently seen by cardiologist who ordered extended Holter monitor and he has to follow-up with cardiologist in regards to Holter monitor.  Endocrinologist had him taking extra dose of Synthroid on Saturdays so that he was taking 225 mcg on Saturday and 150 mcg all other days.  Patient has been exercising running approximately 3 miles 3 times per week without having any issues.  Patient did hold off on taking extra dose of Synthroid this past Saturday.  He has lost weight since he started running.  Labs reviewed.  Slight increase in his total cholesterol and triglycerides otherwise rest of his labs are unremarkable.  He did have MRI of the brain performed which showed normal-appearing pituitary gland and evidence of mucosal thickening in all of his sinuses        Past Medical History:   Diagnosis Date   • Papillary thyroid carcinoma (HCC) 02/23/2018    had total thyroidectomy   • Recent URI     pt states is better - cough in am then subsides   • Rosacea    • Seasonal allergies    • Varicose veins of right lower extremity        Family History   Problem Relation Age of Onset   • Prostate cancer Father         Prostate removed   • Hypertension Father         on meds   • Diabetes Maternal Grandfather    • Prostate cancer Paternal Grandfather        Past Surgical History:    Procedure Laterality Date   • CYST REMOVAL      on back   • HERNIA REPAIR Left     inguinal with mesh   • KNEE ARTHROSCOPY Right    • CO STAB PHLEBT VARICOSE VEINS 1 XTR > 20 INCS Right 11/13/2023    Procedure: MULTIPLE STAB PHLEB;  Surgeon: Teddy Roberts DO;  Location: [facility];  Service: Vascular   • SHOULDER ARTHROSCOPY Bilateral    • TOTAL THYROIDECTOMY     • WISDOM TOOTH EXTRACTION          reports that he quit smoking about 22 years ago. His smoking use included cigarettes. He quit smokeless tobacco use about 22 years ago.  His smokeless tobacco use included chew. He reports current alcohol use. He reports that he does not use drugs.      Current Outpatient Medications:   •  Ascorbic Acid (VITAMIN C) 1000 MG tablet, Take 1,000 mg by mouth daily, Disp: , Rfl:   •  Cholecalciferol (VITAMIN D) 2000 units CAPS, Take 1 capsule by mouth daily, Disp: , Rfl:   •  clomiPHENE (Clomid) 50 mg tablet, TAKE 1/2 TABLET BY MOUTH EVERY OTHER DAY (Patient taking differently: every evening TAKE 1/2 TABLET BY MOUTH EVERY OTHER DAY), Disp: 23 tablet, Rfl: 2  •  Magnesium 100 MG TABS, Take by mouth daily, Disp: , Rfl:   •  metroNIDAZOLE (METROCREAM) 0.75 % cream, Apply topically 2 (two) times a day (Patient taking differently: Apply topically 2 (two) times a day as needed), Disp: 45 g, Rfl: 3  •  Multiple Vitamins-Minerals (ZINC PO), Take by mouth daily, Disp: , Rfl:   •  Omega-3 Fatty Acids (FISH OIL) 645 MG CAPS, Take by mouth daily, Disp: , Rfl:   •  Potassium 99 MG TABS, Take by mouth daily, Disp: , Rfl:   •  Synthroid 150 MCG tablet, Take by mouth 150 mcg 6x week and extra 75 mcg on Saturdays, Disp: , Rfl:   •  Zn-Pyg Afri-Nettle-Saw Palmet (SAW PALMETTO COMPLEX PO), Take by mouth, Disp: , Rfl:   •  butalbital-acetaminophen-caffeine (FIORICET,ESGIC) -40 mg per tablet, Take 1 tablet by mouth every 4 (four) hours as needed for headaches (Patient not taking: Reported on 11/16/2023), Disp: 20 tablet, Rfl: 0    The  "following portions of the patient's history were reviewed and updated as appropriate: allergies, current medications, past family history, past medical history, past social history, past surgical history and problem list.    Review of Systems   Constitutional: Negative.    HENT: Negative.     Eyes: Negative.    Respiratory: Negative.     Cardiovascular:  Positive for palpitations.   Gastrointestinal: Negative.    Endocrine: Negative.    Genitourinary: Negative.    Musculoskeletal: Negative.    Skin: Negative.    Allergic/Immunologic: Negative.    Neurological: Negative.    Hematological: Negative.    Psychiatric/Behavioral: Negative.     All other systems reviewed and are negative.          Objective:    /68   Pulse 70   Resp 16   Ht 5' 7\" (1.702 m)   Wt 83.9 kg (185 lb)   SpO2 98%   BMI 28.98 kg/m²      Physical Exam  Vitals and nursing note reviewed.   Constitutional:       General: He is not in acute distress.     Appearance: Normal appearance. He is well-developed and normal weight. He is not ill-appearing.   HENT:      Head: Normocephalic and atraumatic.      Right Ear: Tympanic membrane, ear canal and external ear normal.      Left Ear: Tympanic membrane, ear canal and external ear normal.      Nose: Nose normal.      Mouth/Throat:      Mouth: Mucous membranes are moist.   Eyes:      Extraocular Movements: Extraocular movements intact.      Conjunctiva/sclera: Conjunctivae normal.      Pupils: Pupils are equal, round, and reactive to light.   Cardiovascular:      Rate and Rhythm: Normal rate and regular rhythm.      Pulses: Normal pulses.      Heart sounds: Normal heart sounds. No murmur heard.  Pulmonary:      Effort: Pulmonary effort is normal.      Breath sounds: Normal breath sounds.   Abdominal:      General: Abdomen is flat. Bowel sounds are normal.      Palpations: Abdomen is soft.   Musculoskeletal:         General: Normal range of motion.      Cervical back: Normal range of motion and neck " supple.   Skin:     General: Skin is warm and dry.   Neurological:      General: No focal deficit present.      Mental Status: He is alert and oriented to person, place, and time.   Psychiatric:         Mood and Affect: Mood normal.         Behavior: Behavior normal.         Thought Content: Thought content normal.         Judgment: Judgment normal.           Recent Results (from the past 1008 hour(s))   ECG 12 lead    Collection Time: 02/22/24  9:44 AM   Result Value Ref Range    Ventricular Rate 67 BPM    Atrial Rate 67 BPM    IL Interval 160 ms    QRSD Interval 94 ms    QT Interval 398 ms    QTC Interval 420 ms    P [place] 63 degrees    QRS Axis 12 degrees    T Wave Axis 17 degrees   CBC and differential    Collection Time: 02/22/24 10:33 AM   Result Value Ref Range    WBC 4.42 4.31 - 10.16 Thousand/uL    RBC 4.94 3.88 - 5.62 Million/uL    Hemoglobin 14.4 12.0 - 17.0 g/dL    Hematocrit 41.8 36.5 - 49.3 %    MCV 85 82 - 98 fL    MCH 29.1 26.8 - 34.3 pg    MCHC 34.4 31.4 - 37.4 g/dL    RDW 12.2 11.6 - 15.1 %    MPV 9.9 8.9 - 12.7 fL    Platelets 199 149 - 390 Thousands/uL    nRBC 0 /100 WBCs    Neutrophils Relative 52 43 - 75 %    Immature Grans % 1 0 - 2 %    Lymphocytes Relative 33 14 - 44 %    Monocytes Relative 9 4 - 12 %    Eosinophils Relative 4 0 - 6 %    Basophils Relative 1 0 - 1 %    Neutrophils Absolute 2.37 1.85 - 7.62 Thousands/µL    Absolute Immature Grans 0.02 0.00 - 0.20 Thousand/uL    Absolute Lymphocytes 1.44 0.60 - 4.47 Thousands/µL    Absolute Monocytes 0.38 0.17 - 1.22 Thousand/µL    Eosinophils Absolute 0.17 0.00 - 0.61 Thousand/µL    Basophils Absolute 0.04 0.00 - 0.10 Thousands/µL   Comprehensive metabolic panel    Collection Time: 02/22/24 10:33 AM   Result Value Ref Range    Sodium 140 135 - 147 mmol/L    Potassium 4.3 3.5 - 5.3 mmol/L    Chloride 108 96 - 108 mmol/L    CO2 25 21 - 32 mmol/L    ANION GAP 7 mmol/L    BUN 18 5 - 25 mg/dL    Creatinine 0.86 0.60 - 1.30 mg/dL    Glucose 109 65 -  "140 mg/dL    Calcium 9.3 8.4 - 10.2 mg/dL    AST 15 13 - 39 U/L    ALT 18 7 - 52 U/L    Alkaline Phosphatase 32 (L) 34 - 104 U/L    Total Protein 6.4 6.4 - 8.4 g/dL    Albumin 4.4 3.5 - 5.0 g/dL    Total Bilirubin 0.52 0.20 - 1.00 mg/dL    eGFR 99 ml/min/1.73sq m   HS Troponin 0hr (reflex protocol)    Collection Time: 02/22/24 10:33 AM   Result Value Ref Range    hs TnI 0hr 4 \"Refer to ACS Flowchart\"- see link ng/L   HS Troponin I 2hr    Collection Time: 02/22/24  1:05 PM   Result Value Ref Range    hs TnI 2hr 4 \"Refer to ACS Flowchart\"- see link ng/L    Delta 2hr hsTnI 0 <20 ng/L   Lipid panel    Collection Time: 03/15/24  7:02 AM   Result Value Ref Range    Total Cholesterol 201 (H) <200 mg/dL    HDL 47 > OR = 40 mg/dL    Triglycerides 158 (H) <150 mg/dL    LDL Calculated 127 (H) mg/dL (calc)    Chol HDLC Ratio 4.3 <5.0 (calc)    Non-HDL Cholesterol 154 (H) <130 mg/dL (calc)   Comprehensive metabolic panel    Collection Time: 03/15/24  7:02 AM   Result Value Ref Range    Glucose, Random 92 65 - 99 mg/dL    BUN 18 7 - 25 mg/dL    Creatinine 0.86 0.70 - 1.30 mg/dL    eGFR 104 > OR = 60 mL/min/1.73m2    SL AMB BUN/CREATININE RATIO SEE NOTE: 6 - 22 (calc)    Sodium 140 135 - 146 mmol/L    Potassium 4.1 3.5 - 5.3 mmol/L    Chloride 106 98 - 110 mmol/L    CO2 25 20 - 32 mmol/L    Calcium 9.3 8.6 - 10.3 mg/dL    Protein, Total 6.6 6.1 - 8.1 g/dL    Albumin 4.5 3.6 - 5.1 g/dL    Globulin 2.1 1.9 - 3.7 g/dL (calc)    Albumin/Globulin Ratio 2.1 1.0 - 2.5 (calc)    TOTAL BILIRUBIN 0.6 0.2 - 1.2 mg/dL    Alkaline Phosphatase 35 35 - 144 U/L    AST 18 10 - 35 U/L    ALT 21 9 - 46 U/L   CBC and differential    Collection Time: 03/15/24  7:02 AM   Result Value Ref Range    White Blood Cell Count 4.3 3.8 - 10.8 Thousand/uL    Red Blood Cell Count 5.17 4.20 - 5.80 Million/uL    Hemoglobin 15.1 13.2 - 17.1 g/dL    HCT 44.4 38.5 - 50.0 %    MCV 85.9 80.0 - 100.0 fL    MCH 29.2 27.0 - 33.0 pg    MCHC 34.0 32.0 - 36.0 g/dL    RDW 12.7 " 11.0 - 15.0 %    Platelet Count 232 140 - 400 Thousand/uL    SL AMB MPV 10.8 7.5 - 12.5 fL    Neutrophils (Absolute) 2,197 1,500 - 7,800 cells/uL    Lymphocytes (Absolute) 1,578 850 - 3,900 cells/uL    Monocytes (Absolute) 292 200 - 950 cells/uL    Eosinophils (Absolute) 202 15 - 500 cells/uL    Basophils ABS 30 0 - 200 cells/uL    Neutrophils 51.1 %    Lymphocytes 36.7 %    Monocytes 6.8 %    Eosinophils 4.7 %    Basophils PCT 0.7 %   PSA, Total Screen    Collection Time: 03/15/24  7:02 AM   Result Value Ref Range    Prostate Specific Antigen Total 0.72 < OR = 4.00 ng/mL   TSH, 3rd generation with Free T4 reflex    Collection Time: 03/15/24  7:02 AM   Result Value Ref Range    TSH W/RFX TO FREE T4 0.29 (L) 0.40 - 4.50 mIU/L    Free t4 1.6 0.8 - 1.8 ng/dL   Vitamin D 25 hydroxy    Collection Time: 03/15/24  7:02 AM   Result Value Ref Range    Vitamin D, 25-Hydroxy, Serum 90 30 - 100 ng/mL   Testosterone, free, total    Collection Time: 03/15/24  7:02 AM   Result Value Ref Range    Testosterone, Total, LC/ 250 - 1,100 ng/dL    Testosterone, Free 66.7 35.0 - 155.0 pg/mL       Assessment/Plan:    Postoperative hypothyroidism  Skin the patient is slightly overcorrected.  It is managed by endocrinologist.  It is possible that his extra dose of Synthroid could have been causing him to have palpitations since he is also lost weight since he started running    Hypogonadotropic hypogonadism (HCC)  Low-dose clomiphene.  Normal free and total testosterone levels.  Repeat in 1 year    Physical exam, annual  Annual physical examination performed    -Offered patient Adacel booster which he declined at this time.  If he ever develops a dirty cut or puncture wound then he should receive tetanus booster    Hypercholesterolemia  Triglycerides mildly elevated.  Repeat lipid profile to be done in 1 year.  No need for treatment          Problem List Items Addressed This Visit        Endocrine    Hypogonadotropic hypogonadism (HCC)      Low-dose clomiphene.  Normal free and total testosterone levels.  Repeat in 1 year         Relevant Orders    Testosterone, free, total    Vitamin D 25 hydroxy    Postoperative hypothyroidism     Skin the patient is slightly overcorrected.  It is managed by endocrinologist.  It is possible that his extra dose of Synthroid could have been causing him to have palpitations since he is also lost weight since he started running         Relevant Orders    TSH, 3rd generation with Free T4 reflex       Other    Hypercholesterolemia     Triglycerides mildly elevated.  Repeat lipid profile to be done in 1 year.  No need for treatment         Relevant Orders    Comprehensive metabolic panel    Lipid panel    Physical exam, annual     Annual physical examination performed    -Offered patient Adacel booster which he declined at this time.  If he ever develops a dirty cut or puncture wound then he should receive tetanus booster         Relevant Orders    CBC and differential   Other Visit Diagnoses     Encounter for immunization    -  Primary    Prostate cancer screening        Relevant Orders    PSA, Total Screen

## 2024-08-26 DIAGNOSIS — E34.9 TESTOSTERONE DEFICIENCY: ICD-10-CM

## 2024-08-28 RX ORDER — CLOMIPHENE CITRATE 50 MG/1
TABLET ORAL
Qty: 23 TABLET | Refills: 2 | Status: SHIPPED | OUTPATIENT
Start: 2024-08-28

## 2024-10-19 ENCOUNTER — HOSPITAL ENCOUNTER (INPATIENT)
Facility: HOSPITAL | Age: 54
LOS: 1 days | Discharge: HOME | DRG: 309 | End: 2024-10-20
Attending: STUDENT IN AN ORGANIZED HEALTH CARE EDUCATION/TRAINING PROGRAM
Payer: COMMERCIAL

## 2024-10-19 ENCOUNTER — APPOINTMENT (EMERGENCY)
Dept: RADIOLOGY | Facility: HOSPITAL | Age: 54
DRG: 309 | End: 2024-10-19
Payer: COMMERCIAL

## 2024-10-19 DIAGNOSIS — I48.91 NEW ONSET ATRIAL FIBRILLATION (HCC): ICD-10-CM

## 2024-10-19 DIAGNOSIS — E89.0 POSTOPERATIVE HYPOTHYROIDISM: ICD-10-CM

## 2024-10-19 DIAGNOSIS — I48.91 ATRIAL FIBRILLATION WITH RAPID VENTRICULAR RESPONSE (HCC): Primary | ICD-10-CM

## 2024-10-19 PROBLEM — Z78.9 ALCOHOL USE: Status: ACTIVE | Noted: 2024-10-19

## 2024-10-19 PROBLEM — F10.90 ALCOHOL USE: Status: ACTIVE | Noted: 2024-10-19

## 2024-10-19 PROBLEM — R06.83 SNORING: Status: ACTIVE | Noted: 2024-10-19

## 2024-10-19 PROBLEM — T43.615A CAFFEINE ADVERSE REACTION: Status: ACTIVE | Noted: 2024-10-19

## 2024-10-19 LAB
2HR DELTA HS TROPONIN: 7 NG/L
4HR DELTA HS TROPONIN: 16 NG/L
ALBUMIN SERPL BCG-MCNC: 4.6 G/DL (ref 3.5–5)
ALP SERPL-CCNC: 36 U/L (ref 34–104)
ALT SERPL W P-5'-P-CCNC: 20 U/L (ref 7–52)
ANION GAP SERPL CALCULATED.3IONS-SCNC: 9 MMOL/L (ref 4–13)
APTT PPP: 29 SECONDS (ref 23–34)
AST SERPL W P-5'-P-CCNC: 18 U/L (ref 13–39)
BASOPHILS # BLD AUTO: 0.04 THOUSANDS/ΜL (ref 0–0.1)
BASOPHILS NFR BLD AUTO: 1 % (ref 0–1)
BILIRUB SERPL-MCNC: 0.38 MG/DL (ref 0.2–1)
BNP SERPL-MCNC: 18 PG/ML (ref 0–100)
BUN SERPL-MCNC: 22 MG/DL (ref 5–25)
CALCIUM SERPL-MCNC: 9.6 MG/DL (ref 8.4–10.2)
CARDIAC TROPONIN I PNL SERPL HS: 12 NG/L
CARDIAC TROPONIN I PNL SERPL HS: 19 NG/L
CARDIAC TROPONIN I PNL SERPL HS: 28 NG/L
CHLORIDE SERPL-SCNC: 106 MMOL/L (ref 96–108)
CO2 SERPL-SCNC: 25 MMOL/L (ref 21–32)
CREAT SERPL-MCNC: 0.92 MG/DL (ref 0.6–1.3)
D DIMER PPP FEU-MCNC: 0.27 UG/ML FEU
EOSINOPHIL # BLD AUTO: 0.28 THOUSAND/ΜL (ref 0–0.61)
EOSINOPHIL NFR BLD AUTO: 4 % (ref 0–6)
ERYTHROCYTE [DISTWIDTH] IN BLOOD BY AUTOMATED COUNT: 12.1 % (ref 11.6–15.1)
FLUAV AG UPPER RESP QL IA.RAPID: NEGATIVE
FLUBV AG UPPER RESP QL IA.RAPID: NEGATIVE
GFR SERPL CREATININE-BSD FRML MDRD: 94 ML/MIN/1.73SQ M
GLUCOSE SERPL-MCNC: 117 MG/DL (ref 65–140)
HCT VFR BLD AUTO: 47.4 % (ref 36.5–49.3)
HGB BLD-MCNC: 16.2 G/DL (ref 12–17)
IMM GRANULOCYTES # BLD AUTO: 0.03 THOUSAND/UL (ref 0–0.2)
IMM GRANULOCYTES NFR BLD AUTO: 1 % (ref 0–2)
INR PPP: 0.89 (ref 0.85–1.19)
LYMPHOCYTES # BLD AUTO: 1.79 THOUSANDS/ΜL (ref 0.6–4.47)
LYMPHOCYTES NFR BLD AUTO: 27 % (ref 14–44)
MAGNESIUM SERPL-MCNC: 2.1 MG/DL (ref 1.9–2.7)
MCH RBC QN AUTO: 29.3 PG (ref 26.8–34.3)
MCHC RBC AUTO-ENTMCNC: 34.2 G/DL (ref 31.4–37.4)
MCV RBC AUTO: 86 FL (ref 82–98)
MONOCYTES # BLD AUTO: 0.44 THOUSAND/ΜL (ref 0.17–1.22)
MONOCYTES NFR BLD AUTO: 7 % (ref 4–12)
NEUTROPHILS # BLD AUTO: 3.96 THOUSANDS/ΜL (ref 1.85–7.62)
NEUTS SEG NFR BLD AUTO: 60 % (ref 43–75)
NRBC BLD AUTO-RTO: 0 /100 WBCS
PLATELET # BLD AUTO: 257 THOUSANDS/UL (ref 149–390)
PMV BLD AUTO: 9.6 FL (ref 8.9–12.7)
POTASSIUM SERPL-SCNC: 4 MMOL/L (ref 3.5–5.3)
PROT SERPL-MCNC: 7.1 G/DL (ref 6.4–8.4)
PROTHROMBIN TIME: 12.7 SECONDS (ref 12.3–15)
RBC # BLD AUTO: 5.52 MILLION/UL (ref 3.88–5.62)
SARS-COV+SARS-COV-2 AG RESP QL IA.RAPID: NEGATIVE
SODIUM SERPL-SCNC: 140 MMOL/L (ref 135–147)
T4 FREE SERPL-MCNC: 0.96 NG/DL (ref 0.61–1.12)
TSH SERPL DL<=0.05 MIU/L-ACNC: 0.25 UIU/ML (ref 0.45–4.5)
WBC # BLD AUTO: 6.54 THOUSAND/UL (ref 4.31–10.16)

## 2024-10-19 PROCEDURE — 93005 ELECTROCARDIOGRAM TRACING: CPT

## 2024-10-19 PROCEDURE — 99291 CRITICAL CARE FIRST HOUR: CPT

## 2024-10-19 PROCEDURE — 83735 ASSAY OF MAGNESIUM: CPT

## 2024-10-19 PROCEDURE — 84484 ASSAY OF TROPONIN QUANT: CPT

## 2024-10-19 PROCEDURE — 96366 THER/PROPH/DIAG IV INF ADDON: CPT

## 2024-10-19 PROCEDURE — 80053 COMPREHEN METABOLIC PANEL: CPT

## 2024-10-19 PROCEDURE — 87811 SARS-COV-2 COVID19 W/OPTIC: CPT | Performed by: STUDENT IN AN ORGANIZED HEALTH CARE EDUCATION/TRAINING PROGRAM

## 2024-10-19 PROCEDURE — 96375 TX/PRO/DX INJ NEW DRUG ADDON: CPT

## 2024-10-19 PROCEDURE — 85379 FIBRIN DEGRADATION QUANT: CPT

## 2024-10-19 PROCEDURE — 83880 ASSAY OF NATRIURETIC PEPTIDE: CPT

## 2024-10-19 PROCEDURE — 84439 ASSAY OF FREE THYROXINE: CPT

## 2024-10-19 PROCEDURE — 85025 COMPLETE CBC W/AUTO DIFF WBC: CPT

## 2024-10-19 PROCEDURE — 71045 X-RAY EXAM CHEST 1 VIEW: CPT

## 2024-10-19 PROCEDURE — 87804 INFLUENZA ASSAY W/OPTIC: CPT | Performed by: STUDENT IN AN ORGANIZED HEALTH CARE EDUCATION/TRAINING PROGRAM

## 2024-10-19 PROCEDURE — 96374 THER/PROPH/DIAG INJ IV PUSH: CPT

## 2024-10-19 PROCEDURE — 96365 THER/PROPH/DIAG IV INF INIT: CPT

## 2024-10-19 PROCEDURE — 84443 ASSAY THYROID STIM HORMONE: CPT

## 2024-10-19 PROCEDURE — 85730 THROMBOPLASTIN TIME PARTIAL: CPT

## 2024-10-19 PROCEDURE — 99223 1ST HOSP IP/OBS HIGH 75: CPT

## 2024-10-19 PROCEDURE — 85610 PROTHROMBIN TIME: CPT

## 2024-10-19 PROCEDURE — 99254 IP/OBS CNSLTJ NEW/EST MOD 60: CPT | Performed by: INTERNAL MEDICINE

## 2024-10-19 PROCEDURE — 36415 COLL VENOUS BLD VENIPUNCTURE: CPT

## 2024-10-19 RX ORDER — DIGOXIN 0.25 MG/ML
500 INJECTION INTRAMUSCULAR; INTRAVENOUS ONCE
Status: COMPLETED | OUTPATIENT
Start: 2024-10-19 | End: 2024-10-19

## 2024-10-19 RX ORDER — ASCORBIC ACID 500 MG
1000 TABLET ORAL DAILY
Status: DISCONTINUED | OUTPATIENT
Start: 2024-10-19 | End: 2024-10-20 | Stop reason: HOSPADM

## 2024-10-19 RX ORDER — LANOLIN ALCOHOL/MO/W.PET/CERES
400 CREAM (GRAM) TOPICAL DAILY
Status: DISCONTINUED | OUTPATIENT
Start: 2024-10-20 | End: 2024-10-20 | Stop reason: HOSPADM

## 2024-10-19 RX ORDER — LEVOTHYROXINE SODIUM 75 UG/1
75 TABLET ORAL WEEKLY
Status: DISCONTINUED | OUTPATIENT
Start: 2024-10-26 | End: 2024-10-20

## 2024-10-19 RX ORDER — POTASSIUM CHLORIDE 750 MG/1
10 TABLET, EXTENDED RELEASE ORAL DAILY
Status: DISCONTINUED | OUTPATIENT
Start: 2024-10-20 | End: 2024-10-20 | Stop reason: HOSPADM

## 2024-10-19 RX ORDER — LEVOTHYROXINE SODIUM 150 UG/1
150 TABLET ORAL
Status: DISCONTINUED | OUTPATIENT
Start: 2024-10-20 | End: 2024-10-20 | Stop reason: HOSPADM

## 2024-10-19 RX ORDER — ASPIRIN 81 MG/1
81 TABLET, CHEWABLE ORAL DAILY
Status: DISCONTINUED | OUTPATIENT
Start: 2024-10-19 | End: 2024-10-20 | Stop reason: HOSPADM

## 2024-10-19 RX ORDER — METOPROLOL TARTRATE 1 MG/ML
5 INJECTION, SOLUTION INTRAVENOUS ONCE
Status: COMPLETED | OUTPATIENT
Start: 2024-10-19 | End: 2024-10-19

## 2024-10-19 RX ORDER — DIGOXIN 0.25 MG/ML
250 INJECTION INTRAMUSCULAR; INTRAVENOUS EVERY 6 HOURS
Status: COMPLETED | OUTPATIENT
Start: 2024-10-19 | End: 2024-10-19

## 2024-10-19 RX ORDER — ENOXAPARIN SODIUM 100 MG/ML
40 INJECTION SUBCUTANEOUS DAILY
Status: DISCONTINUED | OUTPATIENT
Start: 2024-10-19 | End: 2024-10-20 | Stop reason: HOSPADM

## 2024-10-19 RX ORDER — CLOMIPHENE CITRATE 50 MG/1
50 TABLET ORAL EVERY OTHER DAY
Status: DISCONTINUED | OUTPATIENT
Start: 2024-10-20 | End: 2024-10-19

## 2024-10-19 RX ORDER — CHLORAL HYDRATE 500 MG
1000 CAPSULE ORAL DAILY
Status: DISCONTINUED | OUTPATIENT
Start: 2024-10-19 | End: 2024-10-20 | Stop reason: HOSPADM

## 2024-10-19 RX ORDER — ACETAMINOPHEN 325 MG/1
650 TABLET ORAL EVERY 6 HOURS PRN
Status: DISCONTINUED | OUTPATIENT
Start: 2024-10-19 | End: 2024-10-20 | Stop reason: HOSPADM

## 2024-10-19 RX ORDER — SODIUM CHLORIDE, SODIUM GLUCONATE, SODIUM ACETATE, POTASSIUM CHLORIDE, MAGNESIUM CHLORIDE, SODIUM PHOSPHATE, DIBASIC, AND POTASSIUM PHOSPHATE .53; .5; .37; .037; .03; .012; .00082 G/100ML; G/100ML; G/100ML; G/100ML; G/100ML; G/100ML; G/100ML
1000 INJECTION, SOLUTION INTRAVENOUS ONCE
Status: COMPLETED | OUTPATIENT
Start: 2024-10-19 | End: 2024-10-19

## 2024-10-19 RX ADMIN — DIGOXIN 250 MCG: 0.25 INJECTION INTRAMUSCULAR; INTRAVENOUS at 15:58

## 2024-10-19 RX ADMIN — METOROPROLOL TARTRATE 5 MG: 5 INJECTION, SOLUTION INTRAVENOUS at 09:17

## 2024-10-19 RX ADMIN — SODIUM CHLORIDE, SODIUM GLUCONATE, SODIUM ACETATE, POTASSIUM CHLORIDE, MAGNESIUM CHLORIDE, SODIUM PHOSPHATE, DIBASIC, AND POTASSIUM PHOSPHATE 1000 ML: .53; .5; .37; .037; .03; .012; .00082 INJECTION, SOLUTION INTRAVENOUS at 07:36

## 2024-10-19 RX ADMIN — OXYCODONE HYDROCHLORIDE AND ACETAMINOPHEN 1000 MG: 500 TABLET ORAL at 13:51

## 2024-10-19 RX ADMIN — DIGOXIN 500 MCG: 0.25 INJECTION INTRAMUSCULAR; INTRAVENOUS at 10:28

## 2024-10-19 RX ADMIN — OMEGA-3 FATTY ACIDS CAP 1000 MG 1000 MG: 1000 CAP at 13:51

## 2024-10-19 RX ADMIN — METOROPROLOL TARTRATE 5 MG: 5 INJECTION, SOLUTION INTRAVENOUS at 08:39

## 2024-10-19 RX ADMIN — ASPIRIN 81 MG 81 MG: 81 TABLET ORAL at 13:51

## 2024-10-19 RX ADMIN — MULTIPLE VITAMINS W/ MINERALS TAB 1 TABLET: TAB ORAL at 13:51

## 2024-10-19 RX ADMIN — ACETAMINOPHEN 650 MG: 325 TABLET ORAL at 19:22

## 2024-10-19 RX ADMIN — Medication 2000 UNITS: at 13:51

## 2024-10-19 RX ADMIN — METOROPROLOL TARTRATE 5 MG: 5 INJECTION, SOLUTION INTRAVENOUS at 19:48

## 2024-10-19 RX ADMIN — DIGOXIN 250 MCG: 0.25 INJECTION INTRAMUSCULAR; INTRAVENOUS at 22:00

## 2024-10-19 RX ADMIN — ENOXAPARIN SODIUM 40 MG: 40 INJECTION SUBCUTANEOUS at 13:52

## 2024-10-19 NOTE — ED PROVIDER NOTES
Time reflects when diagnosis was documented in both MDM as applicable and the Disposition within this note       Time User Action Codes Description Comment    10/19/2024 10:59 AM Pippa, Taudavisprincess Add [I48.91] Atrial fibrillation with rapid ventricular response (HCC)           ED Disposition       ED Disposition   Admit    Condition   Stable    Date/Time   Sat Oct 19, 2024 10:59 AM    Comment   Case was discussed with Dr. Zhao and the patient's admission status was agreed to be Admission Status: inpatient status to the service of Dr. Zhao.               Assessment & Plan       Medical Decision Making  Patient is a 54-year-old male presents emergency department with palpitations and tachycardia noted on the monitor.  ECG  most consistent with A-fib with RVR.  Initial rate 160.    Differential diagnosis precipitation of his A-fib with RVR includes but is not limited to electrolyte imbalance, alcohol use, pneumonia, less likely pulmonary embolism, ACS, other infectious or inflammatory process.    ED course as below.    Patient's heart rate improved from 160s to 130s to 140s after 5 mg IV metoprolol x 2.  Per cardiology recommendations, digoxin 500 mcg was initiated here in the emergency department.  Case discussed with critical care who determined the patient would be stable for level 2 stepdown admission.  Case was discussed with [facility] internal medicine who accepted patient for admission.    Patient admitted in stable condition to the care of Dr. Zhao,.    Amount and/or Complexity of Data Reviewed  Labs: ordered. Decision-making details documented in ED Course.  Radiology: ordered.    Risk  Prescription drug management.  Decision regarding hospitalization.        ED Course as of 10/19/24 1104   Sat Oct 19, 2024   0754 CBC and differential  CBC unremarkable.  No evidence of anemia, inflammatory, or infectious process.   0824 HS Troponin 0hr (reflex protocol)  Will await 2 hour repeat.    0824  Magnesium  Wnl     0824 Comprehensive metabolic panel  No electrolyte abnormality appreciated.   1002 Case discussed with Dr. Laguerre, Cardiology who recommended administering Digoxin for rate control with admission for TROY with DCCV as an inpatient.    1013 Discussed case with ICU team.       Medications   multi-electrolyte (ISOLYTE-S PH 7.4) bolus 1,000 mL (0 mL Intravenous Stopped 10/19/24 0916)   metoprolol (LOPRESSOR) injection 5 mg (5 mg Intravenous Given 10/19/24 0839)   metoprolol (LOPRESSOR) injection 5 mg (5 mg Intravenous Given 10/19/24 0917)   digoxin (LANOXIN) injection 500 mcg (500 mcg Intravenous Given 10/19/24 1028)       ED Risk Strat Scores           [id number] SCORE      Flowsheet Row Most Recent Value   [id number]    Age 0 Filed at: 10/19/2024 0755   Sex 0 Filed at: 10/19/2024 0755   CHF History 0 Filed at: 10/19/2024 0755   HTN History 0 Filed at: 10/19/2024 0755   Stroke or TIA Symptoms Previously 0 Filed at: 10/19/2024 0755   Vascular Disease History 0 Filed at: 10/19/2024 0755   Diabetes History 0 Filed at: 10/19/2024 0755   [id number] Score 0 Filed at: 10/19/2024 0755                            Wells' Criteria for PE      Flowsheet Row Most Recent Value   Wells' Criteria for PE    Clinical signs and symptoms of DVT 0 Filed at: 10/19/2024 0757   PE is primary diagnosis or equally likely 0 Filed at: 10/19/2024 0757   HR >100 1.5 Filed at: 10/19/2024 0757   Immobilization at least 3 days or Surgery in the previous 4 weeks 0 Filed at: 10/19/2024 0757   Previous, objectively diagnosed PE or DVT 0 Filed at: 10/19/2024 0757   Hemoptysis 0 Filed at: 10/19/2024 0757   Malignancy with treatment within 6 months or palliative 0 Filed at: 10/19/2024 0757   Wells' Criteria Total 1.5 Filed at: 10/19/2024 0757                        History of Present Illness       Chief Complaint   Patient presents with    Palpitations     Pt woke up 3 hrs ago with palpitations and chest discomfort. Denies sob. No  cardiac hx       Past Medical History:   Diagnosis Date    Papillary thyroid carcinoma (HCC) 2018    had total thyroidectomy    Recent URI     pt states is better - cough in am then subsides    Rosacea     Seasonal allergies     Varicose veins of right lower extremity       Past Surgical History:   Procedure Laterality Date    CYST REMOVAL      on back    HERNIA REPAIR Left     inguinal with mesh    KNEE ARTHROSCOPY Right     [place] STAB PHLEBT VARICOSE VEINS 1 XTR > 20 INCS Right 2023    Procedure: MULTIPLE STAB PHLEB;  Surgeon: Teddy Roberts DO;  Location: [facility];  Service: Vascular    SHOULDER ARTHROSCOPY Bilateral     TOTAL THYROIDECTOMY      WISDOM TOOTH EXTRACTION        Family History   Problem Relation Age of Onset    Prostate cancer Father         Prostate removed    Hypertension Father         on meds    Diabetes Maternal Grandfather     Prostate cancer Paternal Grandfather       Social History     Tobacco Use    Smoking status: Former     Current packs/day: 0.00     Types: Cigarettes     Quit date:      Years since quittin.8    Smokeless tobacco: Former     Types: Chew     Quit date: 2002   Vaping Use    Vaping status: Never Used   Substance Use Topics    Alcohol use: Yes     Alcohol/week: 0.0 standard drinks of alcohol     Comment: social    Drug use: No      E-Cigarette/Vaping    E-Cigarette Use Never User       E-Cigarette/Vaping Substances    Nicotine No     THC No     CBD No     Flavoring No     Other No     Unknown No       I have reviewed and agree with the history as documented.     Patient is a 54-year-old male who presents to emergency department with palpitations that began this morning when he woke up.  He reports mild chest pressure and lightheadedness but denies any shortness of breath, nausea, vomiting, abdominal pain, lower extremity edema.   Has history of A-fib 8 months ago which he said spontaneously converted prior to his arrival in the ED. Was diagnosed  with Paroxysmal Afib.  He reports drinking alcohol last night, but no more than usual.  Travels frequently for work.  Recently around his family member who is home from college who was sick at that time.  However, he denies any recent cough, fever, congestion. No prior hx of DVT/PE.      Palpitations  Associated symptoms: no chest pain, no cough, no dizziness, no nausea, no shortness of breath and no vomiting        Review of Systems   Constitutional:  Negative for chills and fever.   HENT:  Negative for sore throat.    Respiratory:  Positive for chest tightness. Negative for cough and shortness of breath.    Cardiovascular:  Positive for palpitations. Negative for chest pain and leg swelling.   Gastrointestinal:  Negative for abdominal pain, nausea and vomiting.   Genitourinary:  Negative for dysuria, frequency and hematuria.   Skin:  Negative for rash.   Neurological:  Positive for light-headedness. Negative for dizziness and headaches.           Objective       ED Triage Vitals   Temperature Pulse Blood Pressure Respirations SpO2 Patient Position - Orthostatic VS   10/19/24 0719 10/19/24 0712 10/19/24 0712 10/19/24 0712 10/19/24 0712 10/19/24 0712   (!) 97.4 °F (36.3 °C) 105 152/78 18 98 % Sitting      Temp Source Heart Rate Source BP Location FiO2 (%) Pain Score    10/19/24 0719 10/19/24 0712 10/19/24 0712 -- --    Oral Monitor Right arm        Vitals      Date and Time Temp Pulse SpO2 Resp BP Pain Score FACES Pain Rating User   10/19/24 1045 -- 139 95 % 16 118/83 -- --    10/19/24 1030 -- 141 97 % 17 108/82 -- --    10/19/24 1015 -- 139 95 % 16 106/77 -- --    10/19/24 1000 -- 144 96 % 16 99/72 -- --    10/19/24 0900 -- 140 96 % 18 112/74 -- --    10/19/24 0846 -- 141 97 % 19 117/68 -- --    10/19/24 0815 -- 155 96 % 18 124/67 -- --    10/19/24 0745 -- 151 95 % 18 135/76 -- --    10/19/24 0727 -- 158 100 % 18 139/86 -- -- LC   10/19/24 0719 97.4 °F (36.3 °C) -- -- -- -- -- -- AD   10/19/24 0717  -- 158 -- -- -- -- -- AD   10/19/24 0712 -- 105 98 % 18 152/78 -- -- AD            Physical Exam  Vitals and nursing note reviewed.   Constitutional:       General: He is not in acute distress.     Appearance: Normal appearance.   HENT:      Head: Normocephalic and atraumatic.   Eyes:      Extraocular Movements: Extraocular movements intact.      Conjunctiva/sclera: Conjunctivae normal.   Cardiovascular:      Rate and Rhythm: Tachycardia present. Rhythm irregular.      Pulses: Normal pulses.      Heart sounds: Normal heart sounds. No murmur heard.     Comments: Tachycardic 158bpm on monitor.   Irregularly Irregular   Pulmonary:      Effort: Pulmonary effort is normal. No respiratory distress.      Breath sounds: Normal breath sounds.   Abdominal:      General: There is no distension.      Palpations: Abdomen is soft.      Tenderness: There is no abdominal tenderness. There is no guarding or rebound.   Musculoskeletal:         General: No tenderness. Normal range of motion.      Cervical back: Normal range of motion.      Right lower leg: No edema.      Left lower leg: No edema.   Skin:     General: Skin is warm and dry.      Capillary Refill: Capillary refill takes less than 2 seconds.      Findings: No rash.   Neurological:      General: No focal deficit present.      Mental Status: He is alert and oriented to person, place, and time.      Sensory: No sensory deficit.   Psychiatric:         Mood and Affect: Mood normal.         Behavior: Behavior normal.         Results Reviewed       Procedure Component Value Units Date/Time    HS Troponin I 2hr [[id number]]  (Normal) Collected: 10/19/24 0922    Lab Status: Final result Specimen: Blood from Arm, Left Updated: 10/19/24 0955     hs TnI 2hr 19 ng/L      Delta 2hr hsTnI 7 ng/L     B-Type Natriuretic Peptide(BNP) [[id number]]  (Normal) Collected: 10/19/24 0734    Lab Status: Final result Specimen: Blood from Arm, Left Updated: 10/19/24 0832     BNP 18 pg/mL     TSH, 3rd  generation with Free T4 reflex [[id number]]  (Abnormal) Collected: 10/19/24 0734    Lab Status: Final result Specimen: Blood from Arm, Left Updated: 10/19/24 0831     TSH 3RD GENERATON 0.246 uIU/mL     T4, free [[id number]] Collected: 10/19/24 0734    Lab Status: In process Specimen: Blood from Arm, Left Updated: 10/19/24 0831    FLU/COVID Rapid Antigen (30 min. TAT) - Preferred screening test in ED [[id number]]  (Normal) Collected: 10/19/24 0801    Lab Status: Final result Specimen: Nares from Nose Updated: 10/19/24 0830     SARS COV Rapid Antigen Negative     Influenza A Rapid Antigen Negative     Influenza B Rapid Antigen Negative    Narrative:      This test has been performed using the [employer] Sarah 2 FLU+SARS Antigen test under the Emergency Use Authorization (EUA). This test has been validated by the  and verified by the performing laboratory. The Sarah uses lateral flow immunofluorescent sandwich assay to detect SARS-COV, Influenza A and Influenza B Antigen.     The [employer] Sarah 2 SARS Antigen test does not differentiate between SARS-CoV and SARS-CoV-2.     Negative results are presumptive and may be confirmed with a molecular assay, if necessary, for patient management. Negative results do not rule out SARS-CoV-2 or influenza infection and should not be used as the sole basis for treatment or patient management decisions. A negative test result may occur if the level of antigen in a sample is below the limit of detection of this test.     Positive results are indicative of the presence of viral antigens, but do not rule out bacterial infection or co-infection with other viruses.     All test results should be used as an adjunct to clinical observations and other information available to the provider.    FOR PEDIATRIC PATIENTS - copy/paste COVID Guidelines URL to browser: [email or web address]    D-Dimer [[id number]]  (Normal) Collected: 10/19/24  0734    Lab Status: Final result Specimen: Blood from Arm, Left Updated: 10/19/24 0824     D-Dimer, Quant 0.27 ug/ml FEU     Narrative:      In the evaluation for possible pulmonary embolism, in the appropriate (Well's Score of 4 or less) patient, the age adjusted d-dimer cutoff for this patient can be calculated as:    Age x 0.01 (in ug/mL) for Age-adjusted D-dimer exclusion threshold for a patient over 50 years.    Protime-INR [[id number]]  (Normal) Collected: 10/19/24 0734    Lab Status: Final result Specimen: Blood from Arm, Left Updated: 10/19/24 0822     Protime 12.7 seconds      INR 0.89    Narrative:      INR Therapeutic Range    Indication                                             INR Range      Atrial Fibrillation                                               2.0-3.0  Hypercoagulable State                                    2.0.2.3  Left Ventricular Asist Device                            2.0-3.0  Mechanical Heart Valve                                  -    Aortic(with afib, MI, embolism, HF, LA enlargement,    and/or coagulopathy)                                     2.0-3.0 (2.5-3.5)     Mitral                                                             2.5-3.5  Prosthetic/Bioprosthetic Heart Valve               2.0-3.0  Venous thromboembolism (VTE: VT, PE        2.0-3.0    APTT [[id number]]  (Normal) Collected: 10/19/24 0734    Lab Status: Final result Specimen: Blood from Arm, Left Updated: 10/19/24 0822     PTT 29 seconds     Comprehensive metabolic panel [[id number]] Collected: 10/19/24 0734    Lab Status: Final result Specimen: Blood from Arm, Left Updated: 10/19/24 0811     Sodium 140 mmol/L      Potassium 4.0 mmol/L      Chloride 106 mmol/L      CO2 25 mmol/L      ANION GAP 9 mmol/L      BUN 22 mg/dL      Creatinine 0.92 mg/dL      Glucose 117 mg/dL      Calcium 9.6 mg/dL      AST 18 U/L      ALT 20 U/L      Alkaline Phosphatase 36 U/L      Total Protein 7.1 g/dL      Albumin 4.6 g/dL      Total  Bilirubin 0.38 mg/dL      eGFR 94 ml/min/1.73sq m     Narrative:      National Kidney Disease Foundation guidelines for Chronic Kidney Disease (CKD):     Stage 1 with normal or high GFR (GFR > 90 mL/min/1.73 square meters)    Stage 2 Mild CKD (GFR = 60-89 mL/min/1.73 square meters)    Stage 3A Moderate CKD (GFR = 45-59 mL/min/1.73 square meters)    Stage 3B Moderate CKD (GFR = 30-44 mL/min/1.73 square meters)    Stage 4 Severe CKD (GFR = 15-29 mL/min/1.73 square meters)    Stage 5 End Stage CKD (GFR <15 mL/min/1.73 square meters)  Note: GFR calculation is accurate only with a steady state creatinine    Magnesium [[id number]]  (Normal) Collected: 10/19/24 0734    Lab Status: Final result Specimen: Blood from Arm, Left Updated: 10/19/24 0811     Magnesium 2.1 mg/dL     HS Troponin 0hr (reflex protocol) [[id number]]  (Normal) Collected: 10/19/24 0734    Lab Status: Final result Specimen: Blood from Arm, Left Updated: 10/19/24 0807     hs TnI 0hr 12 ng/L     HS Troponin I 4hr [[id number]]     Lab Status: No result Specimen: Blood     CBC and differential [[id number]] Collected: 10/19/24 0734    Lab Status: Final result Specimen: Blood from Arm, Left Updated: 10/19/24 0750     WBC 6.54 Thousand/uL      RBC 5.52 Million/uL      Hemoglobin 16.2 g/dL      Hematocrit 47.4 %      MCV 86 fL      MCH 29.3 pg      MCHC 34.2 g/dL      RDW 12.1 %      MPV 9.6 fL      Platelets 257 Thousands/uL      nRBC 0 /100 WBCs      Segmented % 60 %      Immature Grans % 1 %      Lymphocytes % 27 %      Monocytes % 7 %      Eosinophils Relative 4 %      Basophils Relative 1 %      Absolute Neutrophils 3.96 Thousands/µL      Absolute Immature Grans 0.03 Thousand/uL      Absolute Lymphocytes 1.79 Thousands/µL      Absolute Monocytes 0.44 Thousand/µL      Eosinophils Absolute 0.28 Thousand/µL      Basophils Absolute 0.04 Thousands/µL             XR chest 1 view portable    (Results Pending)       ECG 12 Lead Documentation Only    Date/Time:  10/19/2024 10:01 AM    Performed by: Padmini Das MD  Authorized by: Padmini Das MD    Indications / Diagnosis:  Tachycardia, palpitations  ECG reviewed by me, the ED Provider: yes    Patient location:  ED  Previous ECG:     Previous ECG:  Compared to current    Comparison ECG info:  February 22, 2024    Similarity:  Changes noted    Comparison to cardiac monitor: Yes    Interpretation:     Interpretation: abnormal    Rate:     ECG rate:  153    ECG rate assessment: tachycardic    Rhythm:     Rhythm: atrial fibrillation    Ectopy:     Ectopy: PVCs    QRS:     QRS axis:  Normal  Conduction:     Conduction: abnormal    Comments:      A-fib with RVR rate 153.  No ST/T-segment changes negative for acute ischemia.      ED Medication and Procedure Management   Prior to Admission Medications   Prescriptions Last Dose Informant Patient Reported? Taking?   Ascorbic Acid (VITAMIN C) 1000 MG tablet  Self Yes No   Sig: Take 1,000 mg by mouth daily   Cholecalciferol (VITAMIN D) 2000 units CAPS  Self Yes No   Sig: Take 1 capsule by mouth daily   Magnesium 100 MG TABS  Self Yes No   Sig: Take by mouth daily   Multiple Vitamins-Minerals (ZINC PO)  Self Yes No   Sig: Take by mouth daily   Omega-3 Fatty Acids (FISH OIL) 645 MG CAPS  Self Yes No   Sig: Take by mouth daily   Potassium 99 MG TABS  Self Yes No   Sig: Take by mouth daily   Synthroid 150 MCG tablet  Self Yes No   Sig: Take by mouth 150 mcg 6x week and extra 75 mcg on Saturdays   Zn-Pyg Afri-Nettle-Saw Palmet (SAW PALMETTO COMPLEX PO)   Yes No   Sig: Take by mouth   butalbital-acetaminophen-caffeine (FIORICET,ESGIC) -40 mg per tablet  Self No No   Sig: Take 1 tablet by mouth every 4 (four) hours as needed for headaches   Patient not taking: Reported on 11/16/2023   clomiPHENE (Clomid) 50 mg tablet   No No   Sig: TAKE 1/2 TABLET BY MOUTH EVERY OTHER DAY   metroNIDAZOLE (METROCREAM) 0.75 % cream  Self No No   Sig: Apply topically 2 (two) times a day   Patient  taking differently: Apply topically 2 (two) times a day as needed      Facility-Administered Medications: None     Patient's Medications   Discharge Prescriptions    No medications on file     No discharge procedures on file.  ED SEPSIS DOCUMENTATION   Time reflects when diagnosis was documented in both MDM as applicable and the Disposition within this note       Time User Action Codes Description Comment    10/19/2024 10:59 AM Padmini Das Add [I48.91] Atrial fibrillation with rapid ventricular response (HCC)                  Padmini Das MD  10/19/24 1103

## 2024-10-19 NOTE — ASSESSMENT & PLAN NOTE
Patient's wife reports that he does snore at times, but not every night. She does note more snoring when he drinks more. Sleep study in 2017 was negative   Would repeat sleep study outpatient

## 2024-10-19 NOTE — ASSESSMENT & PLAN NOTE
Status post total thyroidectomy due to papillary thyroid cancer  Followed by Ankur Tobar, per patient they want his TSH around 0   Last adjustment of Levothyroxine was in January   Continue Levothyroxine 150 mcg QD with extra 75 mcg on Saturdays   Follow up T4   Endocrinology consult

## 2024-10-19 NOTE — ED NOTES
Patient denies CP at this time. OK for patient to drink water per Dr. Camacho. Iced water provided to patient.     Brittany Campos RN  10/19/24 0824

## 2024-10-19 NOTE — CONSULTS
REQUIRED DOCUMENTATION:      1. This service was provided via Telemedicine -[place]  2. Provider located at [place], [place].  3. TeleMed provider: Silvestre Palomo MD.  4. Identify all parties in room with patient during tele consult:  5.Patient was then informed that this was a Telemedicine visit and that the exam was being conducted confidentially over secure lines. My office door was closed. No one else was in the room.  Patient acknowledged consent and understanding of privacy and security of the Telemedicine visit, and gave us permission to have the assistant stay in the room in order to assist with the history and to conduct the exam.  I informed the patient that I have reviewed their record in Epic and presented the opportunity for them to ask any questions regarding the visit today.  The patient agreed to participate.     Patient is aware this is a billable service.       Consultation - [facility] Endocrinology    Patient Information: Varun Lopez 54 y.o. male MRN: [id number]  Unit/Bed#: S -01 Encounter: [id number]  Admitting Physician: Silvestre Palomo MD  PCP: Jose Mccarthy DO  Date of Consultation:  10/19/24    ASSESSMENT:      PLAN:    Papillary thyroid Carcinoma. He has AJCC stage 1 rt lower pole 2.8cm PTC w/o ETE or LN involvement. S/p total thyroidectomy 7/24/2017 c central node dissection. Never needed CARR therapy. Baseline Tg was undetectable. He has been stable with an undetectable Tg and neg Tg Claudia since surgery.  Current suppressive therapy - levothyroxine 150mch daily M-S and 225mcg Sunday.    TSH today was 0.25uIU/mL and fT4 was 0.96ng/dL. recent past TSH was 0.5uIU/mL.    He may use levothyroxine 150mcg qdaily and skip the extra 75mcg qdaily in context of new onset atrial fibrillation and ability to relac thyroid suppressive therapy 7 yrs after initial diagnosis based on risk vs benefit.  Suggested follow up with primary endocrinology in 4-6 weeks to re-titrate as needed. TSH  goal should be 0.5-2uIU/mL.    2. Atrial fibrillation. Being managed by cardiology. Levothyroxine replacement is a risk factor -will cut back dose as possible. Also advise lifestyle change to reduce caffeine and ETOH.    3. Hypogonadotropic hypogonadism. Goal  total T is 500ng/mL while monitoring estradiol.        Total time spent was 40 min of which >50% was in coordination of care.      Reason for consultation:   Iatrogenic Hyperthyroidism    History of Present Illness:    Varun Lopez is a 54 y.o. male with Hx PTC w/o ETE or LN involvement, postsurgical hypothyroidism, PAFib c tachycardia, Hypogonadism.    He reported chest pressure at presentation.    6/4/2017 US thyroid - Right lower pole 2.0 x 2.2 x 2.5 cm Solid hypoechoic nodule with microcalcification and infiltrative margins with extension to the capsule.  [facility]NAC 6/19/2017 - Bryan 6 malignancy.  7/24/2017 - Total thyroidectomy.     Review of Systems:    Review of Systems   Constitutional:  Positive for activity change and appetite change.   HENT: Negative.     Eyes: Negative.    Respiratory: Negative.     Cardiovascular:  Negative for chest pain.   Gastrointestinal: Negative.    Endocrine: Negative.    Genitourinary: Negative.    Musculoskeletal: Negative.    Skin: Negative.    Allergic/Immunologic: Negative.    Neurological: Negative.    Hematological: Negative.    Psychiatric/Behavioral: Negative.     All other systems reviewed and are negative.      Past Medical and Surgical History:     Past Medical History:   Diagnosis Date    Papillary thyroid carcinoma (HCC) 02/23/2018    had total thyroidectomy    Recent URI     pt states is better - cough in am then subsides    Rosacea     Seasonal allergies     Varicose veins of right lower extremity        Past Surgical History:   Procedure Laterality Date    CYST REMOVAL      on back    HERNIA REPAIR Left     inguinal with mesh    KNEE ARTHROSCOPY Right     CO STAB PHLEBT VARICOSE VEINS 1 XTR > 20 INCS  Right 2023    Procedure: MULTIPLE STAB PHLEB;  Surgeon: Teddy Roberts DO;  Location: [facility];  Service: Vascular    SHOULDER ARTHROSCOPY Bilateral     TOTAL THYROIDECTOMY      WISDOM TOOTH EXTRACTION         Meds/Allergies:    PTA meds:   Prior to Admission Medications   Prescriptions Last Dose Informant Patient Reported? Taking?   Ascorbic Acid (VITAMIN C) 1000 MG tablet  Self Yes No   Sig: Take 1,000 mg by mouth daily   Cholecalciferol (VITAMIN D) 2000 units CAPS  Self Yes No   Sig: Take 1 capsule by mouth daily   Magnesium 100 MG TABS  Self Yes No   Sig: Take by mouth daily   Multiple Vitamins-Minerals (ZINC PO)  Self Yes No   Sig: Take by mouth daily   Omega-3 Fatty Acids (FISH OIL) 645 MG CAPS  Self Yes No   Sig: Take by mouth daily   Potassium 99 MG TABS  Self Yes No   Sig: Take by mouth daily   Synthroid 150 MCG tablet  Self Yes No   Sig: Take by mouth 150 mcg 6x week and extra 75 mcg on    Zn-Pyg Afri-Nettle-Saw Palmet (SAW PALMETTO COMPLEX PO)   Yes No   Sig: Take by mouth   clomiPHENE (Clomid) 50 mg tablet   No No   Sig: TAKE 1/2 TABLET BY MOUTH EVERY OTHER DAY   metroNIDAZOLE (METROCREAM) 0.75 % cream  Self No No   Sig: Apply topically 2 (two) times a day   Patient taking differently: Apply topically 2 (two) times a day as needed      Facility-Administered Medications: None       Allergies: No Known Allergies  History:     Marital Status: /Civil Union   Occupation:   Substance Use History:   Social History     Substance and Sexual Activity   Alcohol Use Yes    Alcohol/week: 0.0 standard drinks of alcohol    Comment: social     Social History     Tobacco Use   Smoking Status Former    Current packs/day: 0.00    Types: Cigarettes    Quit date:     Years since quittin.8   Smokeless Tobacco Former    Types: Chew    Quit date: 2002     Social History     Substance and Sexual Activity   Drug Use No       Family History:    Family History   Problem Relation Age of Onset  "   Prostate cancer Father         Prostate removed    Hypertension Father         on meds    Diabetes Maternal Grandfather     Prostate cancer Paternal Grandfather        Physical Exam:     Vitals:   Blood Pressure: 115/84 (10/19/24 1316)  Pulse: 77 (10/19/24 1316)  Temperature: 98.2 °F (36.8 °C) (10/19/24 1316)  Temp Source: Oral (10/19/24 0719)  Respirations: 18 (10/19/24 1200)  SpO2: 96 % (10/19/24 1316)    Physical Exam  Constitutional:       General: He is not in acute distress.     Appearance: He is well-developed.   HENT:      Head: Normocephalic and atraumatic.      Nose: Nose normal.   Eyes:      Conjunctiva/sclera: Conjunctivae normal.   Pulmonary:      Effort: Pulmonary effort is normal.   Abdominal:      General: There is no distension.   Musculoskeletal:      Cervical back: Normal range of motion and neck supple.   Skin:     Findings: No rash.      Comments: No icterus   Neurological:      Mental Status: He is alert and oriented to person, place, and time.           Lab and Imaging Results: Results Review Statement: I personally reviewed the following image studies in PACS and associated radiology reports: Echocardiogram. My interpretation of the radiology images/reports is: EF 60%.    Results from last 7 days   Lab Units 10/19/24  0734   WBC Thousand/uL 6.54   HEMOGLOBIN g/dL 16.2   HEMATOCRIT % 47.4   PLATELETS Thousands/uL 257   SEGS PCT % 60   LYMPHO PCT % 27   MONO PCT % 7   EOS PCT % 4     Results from last 7 days   Lab Units 10/19/24  0734   POTASSIUM mmol/L 4.0   CHLORIDE mmol/L 106   CO2 mmol/L 25   BUN mg/dL 22   CREATININE mg/dL 0.92   CALCIUM mg/dL 9.6   ALK PHOS U/L 36   ALT U/L 20   AST U/L 18     Results from last 7 days   Lab Units 10/19/24  0734   INR  0.89     No results found for: \"POCGLU\"      ** Please Note: Dragon 360 Dictation voice to text software may have been used in the creation of this document. **  "

## 2024-10-19 NOTE — LETTER
[facility]  FLOOR MED SURG UNIT  [address]  [place] [address]  Dept: [phone]    October 20, 2024     Patient: Varun Lopez   YOB: 1970   Date of Visit: 10/19/2024       To Whom it May Concern:    Varun Lopez is under my professional care. He was seen in the hospital from 10/19/2024 to 10/20/24. He may return to work on Wednesday 10/23/2024  without limitations.    If you have any questions or concerns, please don't hesitate to call.         Sincerely,          INGA Jaffe

## 2024-10-19 NOTE — PLAN OF CARE
Problem: Knowledge Deficit  Goal: Patient/family/caregiver demonstrates understanding of disease process, treatment plan, medications, and discharge instructions  Description: Complete learning assessment and assess knowledge base.  Interventions:  - Provide teaching at level of understanding  - Provide teaching via preferred learning methods  Outcome: Progressing     Problem: DISCHARGE PLANNING  Goal: Discharge to home or other facility with appropriate resources  Description: INTERVENTIONS:  - Identify barriers to discharge w/patient and caregiver  - Arrange for needed discharge resources and transportation as appropriate  - Identify discharge learning needs (meds, wound care, etc.)  - Arrange for interpretive services to assist at discharge as needed  - Refer to Case Management Department for coordinating discharge planning if the patient needs post-hospital services based on physician/advanced practitioner order or complex needs related to functional status, cognitive ability, or social support system  Outcome: Progressing     Problem: SAFETY ADULT  Goal: Patient will remain free of falls  Description: INTERVENTIONS:  - Educate patient/family on patient safety including physical limitations  - Instruct patient to call for assistance with activity   - Consult OT/PT to assist with strengthening/mobility   - Keep Call bell within reach  - Keep bed low and locked with side rails adjusted as appropriate  - Keep care items and personal belongings within reach  - Initiate and maintain comfort rounds  - Make Fall Risk Sign visible to staff  - Offer Toileting every 2 Hours, in advance of need  - Initiate/Maintain bed alarm  - Apply yellow socks and bracelet for high fall risk patients  - Consider moving patient to room near nurses station  Outcome: Progressing  Goal: Maintain or return to baseline ADL function  Description: INTERVENTIONS:  -  Assess patient's ability to carry out ADLs; assess patient's baseline for ADL  function and identify physical deficits which impact ability to perform ADLs (bathing, care of mouth/teeth, toileting, grooming, dressing, etc.)  - Assess/evaluate cause of self-care deficits   - Assess range of motion  - Assess patient's mobility; develop plan if impaired  - Assess patient's need for assistive devices and provide as appropriate  - Encourage maximum independence but intervene and supervise when necessary  - Involve family in performance of ADLs  - Assess for home care needs following discharge   - Consider OT consult to assist with ADL evaluation and planning for discharge  - Provide patient education as appropriate  Outcome: Progressing  Goal: Maintains/Returns to pre admission functional level  Description: INTERVENTIONS:  - Perform AM-PAC 6 Click Basic Mobility/ Daily Activity assessment daily.  - Set and communicate daily mobility goal to care team and patient/family/caregiver.   - Collaborate with rehabilitation services on mobility goals if consulted  - Perform Range of Motion 3 times a day.  - Reposition patient every 2 hours.  - Dangle patient 3 times a day  - Stand patient 3 times a day  - Ambulate patient 3 times a day  - Out of bed to chair 3 times a day   - Out of bed for meals 3 times a day  - Out of bed for toileting  - Record patient progress and toleration of activity level   Outcome: Progressing     Problem: PAIN - ADULT  Goal: Verbalizes/displays adequate comfort level or baseline comfort level  Description: Interventions:  - Encourage patient to monitor pain and request assistance  - Assess pain using appropriate pain scale  - Administer analgesics based on type and severity of pain and evaluate response  - Implement non-pharmacological measures as appropriate and evaluate response  - Consider cultural and social influences on pain and pain management  - Notify physician/advanced practitioner if interventions unsuccessful or patient reports new pain  Outcome: Progressing

## 2024-10-19 NOTE — Clinical Note
Case was discussed with Paramjit Benjamin PA-C and the patient's admission status was agreed to be Admission Status: inpatient status to the service of Dr. Berrios

## 2024-10-19 NOTE — QUICK NOTE
Progress Note - Triage Assessment   Varun Lopez 54 y.o. male MRN: [id number]    Time Called: 10:15  Date Called: 10/19/24  Room#: 24  Time Evaluated: 10:30  Person requesting evaluation: Dr. Das    Called to ED to evaluate patient for possible admission to Critical Care Service, chart reviewed and patient evaluated.  Patient is a 54-year-old male past medical history of thyroid cancer status post thyroidectomy 2/2017 on Synthroid who presented to the ED today with a chief complaint of palpitations.  Patient was found to be in A-fib with RVR with initial rate of 160 bpm.  Patient received 2 doses of metoprolol 5 mg but still remained tachycardic in the 140s.  Cardiology was consulted and recommended digoxin 500 mcg x 2.  Patient does report that he missed a dose of his Synthroid medication earlier today.  TSH was decreased at 0.2 with free T4 and T3 pending.  At time of physical exam patient is resting comfortably on room air and does not have any critical care needs at this time.    Case discussed with Critical Care attending Dr. Camacho and after review it was felt the patient is appropriate for admission to a general medical floor.      Recommendations discussed with ED attending Dr. Camacho          Triage Assessment:     Patient appropriate to be admitted to Med Surg with Telemetry  level of care.    If any questions or concerns please call the critical care team via Secure Chat.

## 2024-10-19 NOTE — H&P
H&P - Hospitalist   Name: Varun Lopez 54 y.o. male I MRN: [id number]  Unit/Bed#: ED-24 I Date of Admission: 10/19/2024   Date of Service: 10/19/2024 I Hospital Day: 0     Assessment & Plan  New onset atrial fibrillation (HCC)  Patient started with palpitations at 0300 day of admission and overall felt poorly and had some chest tightness. He was found to be in rapid A. Fib up in the 150's in the ED.   [id number] Score is 0   Thought he was in A. Fib in February due to Apple Watch finding and presented to ED. No signs of A. Fib and outpatient Holter was negative   Suspect etiology is a mixture of alcohol use, caffeine use, possible sleep apnea (snores, but 2017 sleep study negative), as well as Synthroid usage  Troponin negative x 2, electrolytes acceptable   Status post Digoxin 500 mcg in ED and Metoprolol 5 mg IV x 2  Admit patient to med/surg under inpatient status   Consult Cardiology   ECHO  Telemetry   Complete Digoxin load   250 mcg IV x 2 more doses at 6 hour interval   ASA 81 mg QD   ?DCCV  Postoperative hypothyroidism  Status post total thyroidectomy due to papillary thyroid cancer  Followed by Ankur Tobar, per patient they want his TSH around 0   Last adjustment of Levothyroxine was in January   Continue Levothyroxine 150 mcg QD with extra 75 mcg on Saturdays   Follow up T4   Endocrinology consult   Hypogonadotropic hypogonadism (HCC)  Continue Clomid 50 mg QOD  Alcohol use  Patient states that he is a bourbon and beer drinker. States that on average he has 1-2 drinks nightly except on Sundays. Sometimes he drinks more, but this is only on rare occasions   Advised reduction in use   Caffeine adverse reaction  Patient states that he drinks coffee all day at work   Advised reduction in use   Snoring  Patient's wife reports that he does snore at times, but not every night. She does note more snoring when he drinks more. Sleep study in 2017 was negative   Would repeat sleep study outpatient      VTE  "Pharmacologic Prophylaxis: VTE Score: 3 Moderate Risk (Score 3-4) - Pharmacological DVT Prophylaxis Ordered: enoxaparin (Lovenox).  Code Status: Full code   Discussion with family: Updated  (wife) at bedside.    Anticipated Length of Stay: Patient will be admitted on an inpatient basis with an anticipated length of stay of greater than 2 midnights secondary to as per above assessment and plan .    History of Present Illness   Chief Complaint: Palpitations, Racing Heart     Varun Lopez is a 54 y.o. male with a PMH of papillary thyroid cancer status post thyroidectomy who presents with palpitations. He was found to be in A. Fib with RVR in the ED. Patient reports symptoms started at 3 AM. He states that he felt his heart persistently racing and overall felt poorly. He reported some heaviness in his chest as well. He states that he has felt these symptoms very intermittently since January, but they always self-resolve. He reports being seen at [facility] in February because his Apple Watch found A. Fib. This was not confirmed in the ED and on subsequent Holter Monitor. He denies recent illnesses. He states that he does drink alcohol, bourbon and beer. He will have 1-2 drinks daily, except on Sundays, and on some occasions will have more, but this is rare. He reports drinking coffee \"all day\" at work, totaling about 4-5 cups. He reports drinking a lot of water at well. His wife reports that he does snore sometimes. He had a negative sleep study in 2017. He is status post thyroidectomy and is on Levothyroxine with last adjustment by Ankur Tobar in January.   He reports a history of CAD in his father, but denies history of arrhythmias.     Review of Systems   Constitutional:  Negative for appetite change, chills, diaphoresis, fatigue and fever.   HENT:  Negative for congestion, rhinorrhea and sore throat.    Eyes:  Negative for visual disturbance.   Respiratory:  Negative for cough, chest tightness, " shortness of breath and wheezing.    Cardiovascular:  Positive for palpitations. Negative for chest pain and leg swelling.   Gastrointestinal:  Negative for abdominal pain, constipation, diarrhea, nausea and vomiting.   Genitourinary:  Negative for dysuria.   Musculoskeletal:  Negative for arthralgias and myalgias.   Neurological:  Negative for dizziness, syncope, weakness, light-headedness, numbness and headaches.   All other systems reviewed and are negative.      Historical Information   Past Medical History:   Diagnosis Date    Papillary thyroid carcinoma (HCC) 2018    had total thyroidectomy    Recent URI     pt states is better - cough in am then subsides    Rosacea     Seasonal allergies     Varicose veins of right lower extremity      Past Surgical History:   Procedure Laterality Date    CYST REMOVAL      on back    HERNIA REPAIR Left     inguinal with mesh    KNEE ARTHROSCOPY Right     NH STAB PHLEBT VARICOSE VEINS 1 XTR > 20 INCS Right 2023    Procedure: MULTIPLE STAB PHLEB;  Surgeon: Teddy Roberts DO;  Location: [facility];  Service: Vascular    SHOULDER ARTHROSCOPY Bilateral     TOTAL THYROIDECTOMY      WISDOM TOOTH EXTRACTION       Social History     Tobacco Use    Smoking status: Former     Current packs/day: 0.00     Types: Cigarettes     Quit date:      Years since quittin.8    Smokeless tobacco: Former     Types: Chew     Quit date: 2002   Vaping Use    Vaping status: Never Used   Substance and Sexual Activity    Alcohol use: Yes     Alcohol/week: 0.0 standard drinks of alcohol     Comment: social    Drug use: No    Sexual activity: Yes     Partners: Female     Comment: , vasectomy     E-Cigarette/Vaping    E-Cigarette Use Never User      E-Cigarette/Vaping Substances    Nicotine No     THC No     CBD No     Flavoring No     Other No     Unknown No      Family History   Problem Relation Age of Onset    Prostate cancer Father         Prostate removed     Hypertension Father         on meds    Diabetes Maternal Grandfather     Prostate cancer Paternal Grandfather      Social History:  Marital Status: /Civil Union   Occupation: Noncontributory   Patient Pre-hospital Living Situation: Home  Patient Pre-hospital Level of Mobility: walks  Patient Pre-hospital Diet Restrictions: None    Meds/Allergies   I have reviewed home medications with patient personally.  Prior to Admission medications    Medication Sig Start Date End Date Taking? Authorizing Provider   Ascorbic Acid (VITAMIN C) 1000 MG tablet Take 1,000 mg by mouth daily    Historical Provider, MD   Cholecalciferol (VITAMIN D) 2000 units CAPS Take 1 capsule by mouth daily 3/7/17   Historical Provider, MD   clomiPHENE (Clomid) 50 mg tablet TAKE 1/2 TABLET BY MOUTH EVERY OTHER DAY 8/28/24   Jose Mccarthy DO   Magnesium 100 MG TABS Take by mouth daily    Historical Provider, MD   metroNIDAZOLE (METROCREAM) 0.75 % cream Apply topically 2 (two) times a day  Patient taking differently: Apply topically 2 (two) times a day as needed 4/14/23   Jose Mccarthy DO   Multiple Vitamins-Minerals (ZINC PO) Take by mouth daily    Historical Provider, MD   Omega-3 Fatty Acids (FISH OIL) 645 MG CAPS Take by mouth daily    Historical Provider, MD   Potassium 99 MG TABS Take by mouth daily    Historical Provider, MD   Synthroid 150 MCG tablet Take by mouth 150 mcg 6x week and extra 75 mcg on Saturdays 2/6/23   Historical Provider, MD   Zn-Pyg Afri-Nettle-Saw Palmet (SAW PALMETTO COMPLEX PO) Take by mouth    Historical Provider, MD   butalbital-acetaminophen-caffeine (FIORICET,ESGIC) -40 mg per tablet Take 1 tablet by mouth every 4 (four) hours as needed for headaches  Patient not taking: Reported on 11/16/2023 9/2/20 10/19/24  Jose Mccarthy DO     No Known Allergies    Objective :  Temp:  [97.4 °F (36.3 °C)] 97.4 °F (36.3 °C)  HR:  [105-158] 140  BP: ()/(67-88) 131/88  Resp:  [16-19] 18  SpO2:  [95 %-100 %] 95 %  O2  Device: None (Room air)    Physical Exam  Vitals and nursing note reviewed.   Constitutional:       General: He is not in acute distress.     Appearance: Normal appearance. He is normal weight. He is not ill-appearing or diaphoretic.   HENT:      Head: Normocephalic and atraumatic.      Mouth/Throat:      Mouth: Mucous membranes are moist.   Eyes:      General: No scleral icterus.     Pupils: Pupils are equal, round, and reactive to light.   Cardiovascular:      Rate and Rhythm: Tachycardia present. Rhythm irregularly irregular.      Pulses: Normal pulses.      Heart sounds: Normal heart sounds, S1 normal and S2 normal. No murmur heard.     No systolic murmur is present.      No diastolic murmur is present.      No gallop. No S3 or S4 sounds.   Pulmonary:      Effort: Pulmonary effort is normal. No accessory muscle usage or respiratory distress.      Breath sounds: Normal breath sounds. No stridor. No decreased breath sounds, wheezing, rhonchi or rales.   Chest:      Chest wall: No tenderness.   Abdominal:      General: Bowel sounds are normal. There is no distension.      Palpations: Abdomen is soft.      Tenderness: There is no abdominal tenderness. There is no guarding.   Musculoskeletal:      Right lower leg: No edema.      Left lower leg: No edema.   Skin:     General: Skin is warm and dry.      Coloration: Skin is not jaundiced.   Neurological:      General: No focal deficit present.      Mental Status: He is alert. Mental status is at baseline.      Motor: No tremor or seizure activity.   Psychiatric:         Behavior: Behavior is cooperative.          Lines/Drains:            Lab Results: I have reviewed the following results:  Results from last 7 days   Lab Units 10/19/24  0734   WBC Thousand/uL 6.54   HEMOGLOBIN g/dL 16.2   HEMATOCRIT % 47.4   PLATELETS Thousands/uL 257   SEGS PCT % 60   LYMPHO PCT % 27   MONO PCT % 7   EOS PCT % 4     Results from last 7 days   Lab Units 10/19/24  0734   SODIUM mmol/L 140    POTASSIUM mmol/L 4.0   CHLORIDE mmol/L 106   CO2 mmol/L 25   BUN mg/dL 22   CREATININE mg/dL 0.92   ANION GAP mmol/L 9   CALCIUM mg/dL 9.6   ALBUMIN g/dL 4.6   TOTAL BILIRUBIN mg/dL 0.38   ALK PHOS U/L 36   ALT U/L 20   AST U/L 18   GLUCOSE RANDOM mg/dL 117     Results from last 7 days   Lab Units 10/19/24  0734   INR  0.89         Lab Results   Component Value Date    HGBA1C 4.8 02/20/2018    HGBA1C 4.9 06/16/2017    HGBA1C 5.0 11/18/2013           Imaging Results Review: I reviewed radiology reports from this admission including: chest xray.  Other Study Results Review: EKG was personally reviewed and my interpretation is: Atrial fibrillation.  BPM..    Administrative Statements   I have spent a total time of 75 minutes in caring for this patient on the day of the visit/encounter including Diagnostic results, Risks and benefits of tx options, Instructions for management, Risk factor reductions, Impressions, Counseling / Coordination of care, Documenting in the medical record, Reviewing / ordering tests, medicine, procedures  , Obtaining or reviewing history  , and Communicating with other healthcare professionals .    ** Please Note: This note has been constructed using a voice recognition system. **

## 2024-10-19 NOTE — ASSESSMENT & PLAN NOTE
Patient states that he is a bourbon and beer drinker. States that on average he has 1-2 drinks nightly except on Sundays. Sometimes he drinks more, but this is only on rare occasions   Advised reduction in use

## 2024-10-19 NOTE — ED ATTENDING ATTESTATION
I, Shantel Camacho MD, saw and evaluated the patient. I have discussed the patient with the resident/non-physician practitioner and agree with the resident's/non-physician practitioner's findings, Plan of Care, and MDM as documented in the resident's/non-physician practitioner's note, except where noted. All available labs and Radiology studies were reviewed.  I was present for key portions of any procedure(s) performed by the resident/non-physician practitioner and I was immediately available to provide assistance.       At this point I agree with the current assessment done in the Emergency Department.  I have conducted an independent evaluation of this patient a history and physical is as follows:    Subjective: 54-year-old male with history of BPH and hypothyroidism who presents with sudden onset palpitations at 3 AM this morning with associated pressure-like chest pain but no shortness of breath, recent illness, leg swelling/calf pain, cough/hemoptysis, or any other complaints.  He reports numerous episodes of recent long distance travel in the last 3 months.  He denies family history of early MI or clotting diathesis.  He has previously presented to care in February with palpitations and Apple Watch reading as A-fib with RVR but had a negative workup at that time with an unremarkable Holter monitor workup.  He reports drinking alcohol daily with several beers last night but denies history of complicated withdrawal.  He denies smoking or drug use.    Objective: Tachycardic with irregular rhythm but no acute distress on room air and with normal blood pressure and afebrile.  Good peripheral pulses in all 4 extremities.  No lower extremity edema.  Negative Homans' sign bilaterally.    Assessment/Plan: 54-year-old male with history of BPH presenting with A-fib with RVR.  He has some mild chest pressure but otherwise is asymptomatic.  Unclear etiology of his tachycardia.  He cannot be PERC'd out.  Will obtain chest  x-ray, EKG, labs, give IV fluids, and admit for new A-fib.    Labs with low TSH, negative dimer.  Chest x-ray without acute intrathoracic abnormality.  HR did not respond to IVF, metoprolol. Given Dig in coordination with Cards recs, discussed case with CC team and admitted to [facility].    CriticalCare Time    Date/Time: 10/19/2024 3:23 PM    Performed by: Shantel Camacho MD  Authorized by: Shantel Camacho MD    Critical care provider statement:     Critical care time (minutes):  35 (CC time was exclusive separate form any procedures or treating other patients.)    Critical care time was exclusive of:  Separately billable procedures and treating other patients and teaching time    Critical care was necessary to treat or prevent imminent or life-threatening deterioration of the following conditions:  Cardiac failure and circulatory failure    Critical care was time spent personally by me on the following activities:  Obtaining history from patient or surrogate, development of treatment plan with patient or surrogate, discussions with consultants, evaluation of patient's response to treatment, ordering and performing treatments and interventions, ordering and review of laboratory studies, ordering and review of radiographic studies and re-evaluation of patient's condition

## 2024-10-19 NOTE — ASSESSMENT & PLAN NOTE
Patient started with palpitations at 0300 day of admission and overall felt poorly and had some chest tightness. He was found to be in rapid A. Fib up in the 150's in the ED.   [id number] Score is 0   Thought he was in A. Fib in February due to Apple Watch finding and presented to ED. No signs of A. Fib and outpatient Holter was negative   Suspect etiology is a mixture of alcohol use, caffeine use, possible sleep apnea (snores, but 2017 sleep study negative), as well as Synthroid usage  Troponin negative x 2, electrolytes acceptable   Status post Digoxin 500 mcg in ED and Metoprolol 5 mg IV x 2  Admit patient to med/surg under inpatient status   Consult Cardiology   ECHO  Telemetry   Complete Digoxin load   250 mcg IV x 2 more doses at 6 hour interval   ASA 81 mg QD   ?DCCV

## 2024-10-20 ENCOUNTER — APPOINTMENT (INPATIENT)
Dept: NON INVASIVE DIAGNOSTICS | Facility: HOSPITAL | Age: 54
DRG: 309 | End: 2024-10-20
Payer: COMMERCIAL

## 2024-10-20 VITALS
HEART RATE: 87 BPM | WEIGHT: 185 LBS | BODY MASS INDEX: 29.03 KG/M2 | SYSTOLIC BLOOD PRESSURE: 143 MMHG | RESPIRATION RATE: 16 BRPM | HEIGHT: 67 IN | DIASTOLIC BLOOD PRESSURE: 97 MMHG | OXYGEN SATURATION: 96 % | TEMPERATURE: 97.4 F

## 2024-10-20 LAB
ANION GAP SERPL CALCULATED.3IONS-SCNC: 5 MMOL/L (ref 4–13)
AORTIC ROOT: 2.7 CM
APICAL FOUR CHAMBER EJECTION FRACTION: 63 %
ASCENDING AORTA: 2.7 CM
ATRIAL RATE: 441 BPM
BSA FOR ECHO PROCEDURE: 1.96 M2
BUN SERPL-MCNC: 16 MG/DL (ref 5–25)
CALCIUM SERPL-MCNC: 8.6 MG/DL (ref 8.4–10.2)
CHLORIDE SERPL-SCNC: 108 MMOL/L (ref 96–108)
CO2 SERPL-SCNC: 26 MMOL/L (ref 21–32)
CREAT SERPL-MCNC: 0.8 MG/DL (ref 0.6–1.3)
E WAVE DECELERATION TIME: 155 MS
E/A RATIO: 0.9
FRACTIONAL SHORTENING: 31 (ref 28–44)
GFR SERPL CREATININE-BSD FRML MDRD: 101 ML/MIN/1.73SQ M
GLUCOSE SERPL-MCNC: 96 MG/DL (ref 65–140)
INTERVENTRICULAR SEPTUM IN DIASTOLE (PARASTERNAL SHORT AXIS VIEW): 1.4 CM
INTERVENTRICULAR SEPTUM: 1.4 CM (ref 0.6–1.1)
LAAS-AP2: 23.5 CM2
LAAS-AP4: 20.3 CM2
LEFT ATRIUM SIZE: 4 CM
LEFT ATRIUM VOLUME (MOD BIPLANE): 71 ML
LEFT ATRIUM VOLUME INDEX (MOD BIPLANE): 37 ML/M2
LEFT INTERNAL DIMENSION IN SYSTOLE: 2.9 CM (ref 2.1–4)
LEFT VENTRICULAR INTERNAL DIMENSION IN DIASTOLE: 4.2 CM (ref 3.5–6)
LEFT VENTRICULAR POSTERIOR WALL IN END DIASTOLE: 1.1 CM
LEFT VENTRICULAR STROKE VOLUME: 48 ML
LVSV (TEICH): 48 ML
MAGNESIUM SERPL-MCNC: 2.1 MG/DL (ref 1.9–2.7)
MV E'TISSUE VEL-SEP: 8 CM/S
MV PEAK A VEL: 0.69 M/S
MV PEAK E VEL: 62 CM/S
MV STENOSIS PRESSURE HALF TIME: 45 MS
MV VALVE AREA P 1/2 METHOD: 4.89
POTASSIUM SERPL-SCNC: 4.2 MMOL/L (ref 3.5–5.3)
QRS AXIS: -18 DEGREES
QRSD INTERVAL: 92 MS
QT INTERVAL: 290 MS
QTC INTERVAL: 463 MS
RIGHT ATRIUM AREA SYSTOLE A4C: 14.2 CM2
RIGHT VENTRICLE ID DIMENSION: 4 CM
SL CV LEFT ATRIUM LENGTH A2C: 5.5 CM
SL CV LV EF: 60
SL CV PED ECHO LEFT VENTRICLE DIASTOLIC VOLUME (MOD BIPLANE) 2D: 81 ML
SL CV PED ECHO LEFT VENTRICLE SYSTOLIC VOLUME (MOD BIPLANE) 2D: 32 ML
SODIUM SERPL-SCNC: 139 MMOL/L (ref 135–147)
T WAVE AXIS: 33 DEGREES
TRICUSPID ANNULAR PLANE SYSTOLIC EXCURSION: 2.1 CM
VENTRICULAR RATE: 153 BPM

## 2024-10-20 PROCEDURE — 93306 TTE W/DOPPLER COMPLETE: CPT | Performed by: INTERNAL MEDICINE

## 2024-10-20 PROCEDURE — 99239 HOSP IP/OBS DSCHRG MGMT >30: CPT | Performed by: PHYSICIAN ASSISTANT

## 2024-10-20 PROCEDURE — 93010 ELECTROCARDIOGRAM REPORT: CPT | Performed by: INTERNAL MEDICINE

## 2024-10-20 PROCEDURE — 99254 IP/OBS CNSLTJ NEW/EST MOD 60: CPT | Performed by: INTERNAL MEDICINE

## 2024-10-20 PROCEDURE — 93306 TTE W/DOPPLER COMPLETE: CPT

## 2024-10-20 PROCEDURE — 83735 ASSAY OF MAGNESIUM: CPT | Performed by: PHYSICIAN ASSISTANT

## 2024-10-20 PROCEDURE — RECHECK: Performed by: PHYSICIAN ASSISTANT

## 2024-10-20 PROCEDURE — 80048 BASIC METABOLIC PNL TOTAL CA: CPT | Performed by: PHYSICIAN ASSISTANT

## 2024-10-20 RX ORDER — METOPROLOL SUCCINATE 25 MG/1
25 TABLET, EXTENDED RELEASE ORAL DAILY
Qty: 30 TABLET | Refills: 0 | Status: SHIPPED | OUTPATIENT
Start: 2024-10-21

## 2024-10-20 RX ORDER — METOPROLOL SUCCINATE 25 MG/1
25 TABLET, EXTENDED RELEASE ORAL DAILY
Status: DISCONTINUED | OUTPATIENT
Start: 2024-10-20 | End: 2024-10-20 | Stop reason: HOSPADM

## 2024-10-20 RX ORDER — ASPIRIN 81 MG/1
81 TABLET, CHEWABLE ORAL DAILY
Qty: 30 TABLET | Refills: 0 | Status: SHIPPED | OUTPATIENT
Start: 2024-10-21

## 2024-10-20 RX ORDER — LEVOTHYROXINE SODIUM 150 MCG
150 TABLET ORAL DAILY
Qty: 30 TABLET | Refills: 0 | Status: SHIPPED | OUTPATIENT
Start: 2024-10-20

## 2024-10-20 RX ADMIN — Medication 2000 UNITS: at 08:26

## 2024-10-20 RX ADMIN — Medication 400 MG: at 08:25

## 2024-10-20 RX ADMIN — OMEGA-3 FATTY ACIDS CAP 1000 MG 1000 MG: 1000 CAP at 08:25

## 2024-10-20 RX ADMIN — LEVOTHYROXINE SODIUM 150 MCG: 150 TABLET ORAL at 05:35

## 2024-10-20 RX ADMIN — POTASSIUM CHLORIDE 10 MEQ: 750 TABLET, EXTENDED RELEASE ORAL at 08:25

## 2024-10-20 RX ADMIN — ASPIRIN 81 MG 81 MG: 81 TABLET ORAL at 08:25

## 2024-10-20 RX ADMIN — OXYCODONE HYDROCHLORIDE AND ACETAMINOPHEN 1000 MG: 500 TABLET ORAL at 08:25

## 2024-10-20 RX ADMIN — MULTIPLE VITAMINS W/ MINERALS TAB 1 TABLET: TAB ORAL at 08:25

## 2024-10-20 RX ADMIN — ENOXAPARIN SODIUM 40 MG: 40 INJECTION SUBCUTANEOUS at 08:28

## 2024-10-20 RX ADMIN — METOPROLOL SUCCINATE 25 MG: 25 TABLET, EXTENDED RELEASE ORAL at 10:13

## 2024-10-20 NOTE — ASSESSMENT & PLAN NOTE
Patient started with palpitations at 0300 day of admission and overall felt poorly and had some chest tightness. He was found to be in rapid A. Fib up in the 150's in the ED.   [id number] Score is 0   Thought he was in A. Fib in February due to Apple Watch finding and presented to ED. No signs of A. Fib and outpatient Holter was negative   Suspect etiology is a mixture of alcohol use, caffeine use, possible sleep apnea (snores, but 2017 sleep study negative), as well as Synthroid usage  Troponin negative x 2, electrolytes acceptable   Status post Digoxin 500 mcg in ED and Metoprolol 5 mg IV x 2  He converted to NSR after Digoxin 1 mg and Metoprolol IV   Consult Cardiology, appreciated    ECHO pending - patient would like this done here   Telemetry   Completed Digoxin load  Toprol-XL 25 mg QD   ASA 81 mg QD

## 2024-10-20 NOTE — ASSESSMENT & PLAN NOTE
We discussed limiting caffeine, he is going to switch over to decaf after his morning caffeinated cup of coffee

## 2024-10-20 NOTE — DISCHARGE SUMMARY
Discharge Summary - Hospitalist   Name: Varun Lopez 54 y.o. male I MRN: [id number]  Unit/Bed#: S -[facility] I Date of Admission: 10/19/2024   Date of Service: 10/20/2024 I Hospital Day: 1     Assessment & Plan  New onset atrial fibrillation (HCC)  Patient started with palpitations at 0300 day of admission and overall felt poorly and had some chest tightness. He was found to be in rapid A. Fib up in the 150's in the ED.   [id number] Score is 0   Thought he was in A. Fib in February due to Apple Watch finding and presented to ED. No signs of A. Fib and outpatient Holter was negative   Suspect etiology is a mixture of alcohol use, caffeine use, possible sleep apnea (snores, but 2017 sleep study negative), as well as Synthroid usage  Troponin negative x 2, electrolytes acceptable   Status post Digoxin 500 mcg in ED and Metoprolol 5 mg IV x 2  He converted to NSR after Digoxin 1 mg and Metoprolol IV   ECHO normal   Stable for discharge   Consult Cardiology, appreciated    Follow up Cardiology outpatient   Toprol-XL 25 mg QD   ASA 81 mg QD     Postoperative hypothyroidism  Status post total thyroidectomy due to papillary thyroid cancer  Followed by Ankur Tobar, per patient they want his TSH around 0   Last adjustment of Levothyroxine was in January   Continue Levothyroxine 150 mcg QD   Endocrinology advised skipping extra 75 mcg and following up with his outpatient Endocrinologist       Hypogonadotropic hypogonadism ([facility])  Continue Clomid 50 mg QOD  Alcohol use  Patient states that he is a bourbon and beer drinker. States that on average he has 1-2 drinks nightly except on Sundays. Sometimes he drinks more, but this is only on rare occasions   Advised reduction in use   Caffeine adverse reaction  Patient states that he drinks coffee all day at work   Advised reduction in use   Snoring  Patient's wife reports that he does snore at times, but not every night. She does note more snoring when he drinks more. Sleep study in  2017 was negative   Would repeat sleep study outpatient     Medical Problems       Resolved Problems  Date Reviewed: 10/20/2024   None       Discharging Physician / Practitioner: Murali Flowers PA-C  PCP: Jose Mccarthy DO  Admission Date:   Admission Orders (From admission, onward)       Ordered        10/19/24 1139  INPATIENT ADMISSION  Once                          Discharge Date: 10/20/24    Consultations During Hospital Stay:  Cardiology - Dr. Mckinnon   Endocrinology - Dr. Palomo     Procedures Performed:   CXR  ECHO    Significant Findings / Test Results:   CXR: No acute pulmonary disease   ECHO: normal per discussion with cardiology     Incidental Findings:   None     Test Results Pending at Discharge (will require follow up):   None     Outpatient Tests Requested:  None    Complications:  None    Reason for Admission: A. Fib     Hospital Course:   Varun Lopez is a 54 y.o. male patient who originally presented to the hospital on 10/19/2024 due to palpitations. Patient was found to be in rapid A. Fib. He was Dig loaded in the ED and admitted. Dig load of 1 mg was complete IV. He converted to NSR and will be maintained on Toprol-XL. He will be on ASA 81 mg QD for C2V of 0. ECHO was normal. Due to patient needing to be on Synthroid for post-op hypothyroid he will have his dose reduced to just 150 mcg QAM at the direction of Endocrinology. He will be referred to Cardiology and also follow up with his Endocrinologist outpatient. He will have sleep study repeated and reduce the use of caffeine and alcohol to modify his risk factors.     Hospital Course: No notes on file    The patient, initially admitted to the hospital as inpatient, was discharged earlier than expected given the following: he converted to NSR and was able to be discharged in stable condition prior to meeting prior 2 midnight expectation. .  Please see above list of diagnoses and related plan for additional information.     Condition  at Discharge: stable    Discharge Day Visit / Exam:   * Please refer to separate progress note for these details *    Discussion with Family: Patient declined call to .     Discharge instructions/Information to patient and family:   See after visit summary for information provided to patient and family.      Provisions for Follow-Up Care:  See after visit summary for information related to follow-up care and any pertinent home health orders.      Mobility at time of Discharge:   Basic Mobility Inpatient Raw Score: 24  -HLM Goal: 8: Walk 250 feet or more  HLM Goal achieved. Continue to encourage appropriate mobility.     Disposition:   Home    Planned Readmission: None    Discharge Medications:  See after visit summary for reconciled discharge medications provided to patient and/or family.      Administrative Statements   Discharge Statement:  I have spent a total time of 45 minutes in caring for this patient on the day of the visit/encounter. >30 minutes of time was spent on: Diagnostic results, Instructions for management, Impressions, Counseling / Coordination of care, Documenting in the medical record, Reviewing / ordering tests, medicine, procedures  , and Communicating with other healthcare professionals .    **Please Note: This note may have been constructed using a voice recognition system**

## 2024-10-20 NOTE — PROGRESS NOTES
Progress Note - Hospitalist   Name: Varun Lopez 54 y.o. male I MRN: [id number]  Unit/Bed#: S -[facility] I Date of Admission: 10/19/2024   Date of Service: 10/20/2024 I Hospital Day: 1    Assessment & Plan  New onset atrial fibrillation (HCC)  Patient started with palpitations at 0300 day of admission and overall felt poorly and had some chest tightness. He was found to be in rapid A. Fib up in the 150's in the ED.   [id number] Score is 0   Thought he was in A. Fib in February due to Apple Watch finding and presented to ED. No signs of A. Fib and outpatient Holter was negative   Suspect etiology is a mixture of alcohol use, caffeine use, possible sleep apnea (snores, but 2017 sleep study negative), as well as Synthroid usage  Troponin negative x 2, electrolytes acceptable   Status post Digoxin 500 mcg in ED and Metoprolol 5 mg IV x 2  He converted to NSR after Digoxin 1 mg and Metoprolol IV   Consult Cardiology, appreciated    ECHO pending - patient would like this done here   Telemetry   Completed Digoxin load  Toprol-XL 25 mg QD   ASA 81 mg QD     Postoperative hypothyroidism  Status post total thyroidectomy due to papillary thyroid cancer  Followed by Ankur Tobar, per patient they want his TSH around 0   Last adjustment of Levothyroxine was in January   Continue Levothyroxine 150 mcg QD   Endocrinology advised skipping extra 75 mcg and following up with his outpatient Endocrinologist   Follow up T4     Hypogonadotropic hypogonadism (HCC)  Continue Clomid 50 mg QOD  Alcohol use  Patient states that he is a bourbon and beer drinker. States that on average he has 1-2 drinks nightly except on Sundays. Sometimes he drinks more, but this is only on rare occasions   Advised reduction in use   Caffeine adverse reaction  Patient states that he drinks coffee all day at work   Advised reduction in use   Snoring  Patient's wife reports that he does snore at times, but not every night. She does note more snoring when he  drinks more. Sleep study in 2017 was negative   Would repeat sleep study outpatient    VTE Pharmacologic Prophylaxis: VTE Score: 3 Moderate Risk (Score 3-4) - Pharmacological DVT Prophylaxis Ordered: enoxaparin (Lovenox).    Mobility:   Basic Mobility Inpatient Raw Score: 24  JH-HLM Goal: 8: Walk 250 feet or more  JH-HLM Goal achieved. Continue to encourage appropriate mobility.    Patient Centered Rounds: I performed bedside rounds with nursing staff today.   Discussions with Specialists or Other Care Team Provider: Discussed with RNJOSE LUIS    Education and Discussions with Family / Patient: Patient declined call to .     Current Length of Stay: 1 day(s)  Current Patient Status: Inpatient   Certification Statement: The patient will continue to require additional inpatient hospital stay due to ECHO  Discharge Plan: Anticipate discharge later today or tomorrow to home.    Code Status: Level 1 - Full Code    Subjective   Patient reports he is feeling well today. He is glad that he converted. Denies chest pain or shortness of breath.     Objective :  Temp:  [97.4 °F (36.3 °C)-98.2 °F (36.8 °C)] 97.4 °F (36.3 °C)  HR:  [] 79  BP: ()/(72-94) 164/94  Resp:  [16-18] 16  SpO2:  [93 %-98 %] 98 %  O2 Device: None (Room air)    There is no height or weight on file to calculate BMI.     Input and Output Summary (last 24 hours):     Intake/Output Summary (Last 24 hours) at 10/20/2024 0947  Last data filed at 10/19/2024 2001  Gross per 24 hour   Intake 480 ml   Output --   Net 480 ml       Physical Exam  Constitutional:       General: He is not in acute distress.     Appearance: Normal appearance. He is normal weight. He is not ill-appearing or diaphoretic.   HENT:      Head: Normocephalic and atraumatic.      Mouth/Throat:      Mouth: Mucous membranes are moist.   Eyes:      General: No scleral icterus.     Pupils: Pupils are equal, round, and reactive to light.   Cardiovascular:      Rate and Rhythm:  Normal rate and regular rhythm.      Pulses: Normal pulses.      Heart sounds: Normal heart sounds, S1 normal and S2 normal. No murmur heard.     No systolic murmur is present.      No diastolic murmur is present.      No gallop. No S3 or S4 sounds.   Pulmonary:      Effort: Pulmonary effort is normal. No accessory muscle usage or respiratory distress.      Breath sounds: Normal breath sounds. No stridor. No decreased breath sounds, wheezing, rhonchi or rales.   Chest:      Chest wall: No tenderness.   Abdominal:      General: Bowel sounds are normal. There is no distension.      Palpations: Abdomen is soft.      Tenderness: There is no abdominal tenderness. There is no guarding.   Musculoskeletal:      Right lower leg: No edema.      Left lower leg: No edema.   Skin:     General: Skin is warm and dry.      Coloration: Skin is not jaundiced.   Neurological:      General: No focal deficit present.      Mental Status: He is alert. Mental status is at baseline.      Motor: No tremor or seizure activity.   Psychiatric:         Behavior: Behavior is cooperative.           Lines/Drains:        Telemetry:  Telemetry Orders (From admission, onward)               24 Hour Telemetry Monitoring  Continuous x 24 Hours (Telem)        Question:  Reason for 24 Hour Telemetry  Answer:  Arrhythmias requiring acute medical intervention / PPM or ICD malfunction                     Telemetry Reviewed: Normal Sinus Rhythm  Indication for Continued Telemetry Use: Arrthymias requiring medical therapy               Lab Results: I have reviewed the following results:   Results from last 7 days   Lab Units 10/19/24  0734   WBC Thousand/uL 6.54   HEMOGLOBIN g/dL 16.2   HEMATOCRIT % 47.4   PLATELETS Thousands/uL 257   SEGS PCT % 60   LYMPHO PCT % 27   MONO PCT % 7   EOS PCT % 4     Results from last 7 days   Lab Units 10/20/24  0538 10/19/24  0734   SODIUM mmol/L 139 140   POTASSIUM mmol/L 4.2 4.0   CHLORIDE mmol/L 108 106   CO2 mmol/L 26 25    BUN mg/dL 16 22   CREATININE mg/dL 0.80 0.92   ANION GAP mmol/L 5 9   CALCIUM mg/dL 8.6 9.6   ALBUMIN g/dL  --  4.6   TOTAL BILIRUBIN mg/dL  --  0.38   ALK PHOS U/L  --  36   ALT U/L  --  20   AST U/L  --  18   GLUCOSE RANDOM mg/dL 96 117     Results from last 7 days   Lab Units 10/19/24  0734   INR  0.89                   Recent Cultures (last 7 days):         Imaging Results Review: No pertinent imaging studies reviewed.  Other Study Results Review: No additional pertinent studies reviewed.    Last 24 Hours Medication List:     Current Facility-Administered Medications:     acetaminophen (TYLENOL) tablet 650 mg, Q6H PRN    ascorbic acid (VITAMIN C) tablet 1,000 mg, Daily    aspirin chewable tablet 81 mg, Daily    Cholecalciferol (VITAMIN D3) tablet 2,000 Units, Daily    enoxaparin (LOVENOX) subcutaneous injection 40 mg, Daily    fish oil capsule 1,000 mg, Daily    levothyroxine tablet 150 mcg, Early Morning    magnesium Oxide (MAG-OX) tablet 400 mg, Daily    metoprolol succinate (TOPROL-XL) 24 hr tablet 25 mg, Daily    multivitamin-minerals (CENTRUM) tablet 1 tablet, Daily    potassium chloride (Klor-Con M10) CR tablet 10 mEq, Daily    Administrative Statements   Today, Patient Was Seen By: Murali Flowers PA-C  I have spent a total time of 35 minutes in caring for this patient on the day of the visit/encounter including Diagnostic results, Instructions for management, Impressions, Counseling / Coordination of care, Documenting in the medical record, Reviewing / ordering tests, medicine, procedures  , Obtaining or reviewing history  , and Communicating with other healthcare professionals .    **Please Note: This note may have been constructed using a voice recognition system.**

## 2024-10-20 NOTE — ASSESSMENT & PLAN NOTE
Patient presented in atrial fibrillation with RVR with heart rates in the 150s.   Became mildly hypotensive with IV metoprolol.  Eventually converted after IV digoxin loading.  He is maintained sinus rhythm since last night  Electrolytes okay  TSH low 0.26.  Endocrine consulted and making adjustments to his Synthroid  No clinical signs or symptoms of heart failure  We discussed at length lifestyle modifications including limiting caffeine, alcohol; recommend outpatient sleep study.  Will start metoprolol succinate 25 mg daily  [id number] or of 0; no anticoagulation initiated at this time.  Recommend outpatient at least 2-week ZIO to eval for any recurrent A-fib.  Patient is also very symptomatic and will be aware of any recurrent episodes.  Echocardiogram ordered, he did have a recent stress echocardiogram at [facility] with normal EF.  If for some reason echocardiogram is unable to be done today he can be discharged home and have this as an outpatient but he prefers to wait to have it done today if possible.    Our office will contact him to arrange 2-week follow-up with her nurse practitioner.

## 2024-10-20 NOTE — ASSESSMENT & PLAN NOTE
Patient started with palpitations at 0300 day of admission and overall felt poorly and had some chest tightness. He was found to be in rapid A. Fib up in the 150's in the ED.   [id number] Score is 0   Thought he was in A. Fib in February due to Apple Watch finding and presented to ED. No signs of A. Fib and outpatient Holter was negative   Suspect etiology is a mixture of alcohol use, caffeine use, possible sleep apnea (snores, but 2017 sleep study negative), as well as Synthroid usage  Troponin negative x 2, electrolytes acceptable   Status post Digoxin 500 mcg in ED and Metoprolol 5 mg IV x 2  He converted to NSR after Digoxin 1 mg and Metoprolol IV   ECHO normal   Stable for discharge   Consult Cardiology, appreciated    Follow up Cardiology outpatient   Toprol-XL 25 mg QD   ASA 81 mg QD

## 2024-10-20 NOTE — PLAN OF CARE
Problem: PAIN - ADULT  Goal: Verbalizes/displays adequate comfort level or baseline comfort level  Description: Interventions:  - Encourage patient to monitor pain and request assistance  - Assess pain using appropriate pain scale  - Administer analgesics based on type and severity of pain and evaluate response  - Implement non-pharmacological measures as appropriate and evaluate response  - Consider cultural and social influences on pain and pain management  - Notify physician/advanced practitioner if interventions unsuccessful or patient reports new pain  Outcome: Progressing     Problem: SAFETY ADULT  Goal: Patient will remain free of falls  Description: INTERVENTIONS:  - Educate patient/family on patient safety including physical limitations  - Instruct patient to call for assistance with activity   - Consult OT/PT to assist with strengthening/mobility   - Keep Call bell within reach  - Keep bed low and locked with side rails adjusted as appropriate  - Keep care items and personal belongings within reach  - Initiate and maintain comfort rounds  - Make Fall Risk Sign visible to staff  - Offer Toileting every 2 Hours, in advance of need  - Apply yellow socks and bracelet for high fall risk patients  - Consider moving patient to room near nurses station  Outcome: Progressing  Goal: Maintain or return to baseline ADL function  Description: INTERVENTIONS:  -  Assess patient's ability to carry out ADLs; assess patient's baseline for ADL function and identify physical deficits which impact ability to perform ADLs (bathing, care of mouth/teeth, toileting, grooming, dressing, etc.)  - Assess/evaluate cause of self-care deficits   - Assess range of motion  - Assess patient's mobility; develop plan if impaired  - Assess patient's need for assistive devices and provide as appropriate  - Encourage maximum independence but intervene and supervise when necessary  - Involve family in performance of ADLs  - Assess for home  care needs following discharge   - Consider OT consult to assist with ADL evaluation and planning for discharge  - Provide patient education as appropriate  Outcome: Progressing  Goal: Maintains/Returns to pre admission functional level  Description: INTERVENTIONS:  - Perform AM-PAC 6 Click Basic Mobility/ Daily Activity assessment daily.  - Set and communicate daily mobility goal to care team and patient/family/caregiver.   - Collaborate with rehabilitation services on mobility goals if consulted  - Perform Range of Motion 3 times a day.  - Reposition patient every 2 hours.  - Dangle patient 3 times a day  - Stand patient 3 times a day  - Ambulate patient 3 times a day  - Out of bed to chair 3 times a day   - Out of bed for meals 3 times a day  - Out of bed for toileting  - Record patient progress and toleration of activity level   Outcome: Progressing     Problem: DISCHARGE PLANNING  Goal: Discharge to home or other facility with appropriate resources  Description: INTERVENTIONS:  - Identify barriers to discharge w/patient and caregiver  - Arrange for needed discharge resources and transportation as appropriate  - Identify discharge learning needs (meds, wound care, etc.)  - Arrange for interpretive services to assist at discharge as needed  - Refer to Case Management Department for coordinating discharge planning if the patient needs post-hospital services based on physician/advanced practitioner order or complex needs related to functional status, cognitive ability, or social support system  Outcome: Progressing     Problem: Knowledge Deficit  Goal: Patient/family/caregiver demonstrates understanding of disease process, treatment plan, medications, and discharge instructions  Description: Complete learning assessment and assess knowledge base.  Interventions:  - Provide teaching at level of understanding  - Provide teaching via preferred learning methods  Outcome: Progressing

## 2024-10-20 NOTE — CONSULTS
Consultation - Cardiology Team One  Varun Lopez 54 y.o. male MRN: [id number]  Unit/Bed#: S -01 Encounter: [id number]    Inpatient consult to Cardiology  Consult performed by: INGA Jaffe  Consult ordered by: Murali Flowers PA-C        Physician Requesting Consult: Ry Thorne MD    Reason for Consult / Principal Problem: Atrial fibrillation    Assessment & Plan  New onset atrial fibrillation (HCC)  Patient presented in atrial fibrillation with RVR with heart rates in the 150s.   Became mildly hypotensive with IV metoprolol.  Eventually converted after IV digoxin loading.  He is maintained sinus rhythm since last night  Electrolytes okay  TSH low 0.26.  Endocrine consulted and making adjustments to his Synthroid  No clinical signs or symptoms of heart failure  We discussed at length lifestyle modifications including limiting caffeine, alcohol; recommend outpatient sleep study.  Will start metoprolol succinate 25 mg daily  [id number] or of 0; no anticoagulation initiated at this time.  Recommend outpatient at least 2-week ZIO to eval for any recurrent A-fib.  Patient is also very symptomatic and will be aware of any recurrent episodes.  Echocardiogram ordered, he did have a recent stress echocardiogram at [facility] with normal EF.  If for some reason echocardiogram is unable to be done today he can be discharged home and have this as an outpatient but he prefers to wait to have it done today if possible.    Our office will contact him to arrange 2-week follow-up with her nurse practitioner.   Postoperative hypothyroidism  TSH 0.26; endocrine consulted and adjusting TSH.  He will follow-up with his primary endocrinologist at [facility]  Caffeine adverse reaction  We discussed limiting caffeine, he is going to switch over to decaf after his morning caffeinated cup of coffee  Alcohol use  Drinks bourbon daily, discussed cutting back.  He is agreeable         History of Present Illness    HPI: Varun Lopez is a 54 y.o. year old male who has a history of former smoker, borderline hypertension, papillary thyroid cancer 2017 status post thyroidectomy.  He follows with cardiologist Dr. Stephenson at [facility].    Patient presented to the hospital after being awoken from sleep at approximately 2 AM on 10/19 with palpitations.  Felt his heart racing, watch showed heart rates above 140s.  Checked his blood pressure which was 100/70 which is quite low for him.  He eventually had his wife drive him to the emergency department.  While in the ED he felt very fatigued a little lightheaded and had some chest tightness.  Presenting EKG showed atrial fibrillation with ventricular rate 1 5 3 bpm.  Blood pressure was 152/78, afebrile, SpO2 98% on room air.  Chest x-ray NAD  Labs are significant for potassium 4.0, mag 2.1, creatinine 0.92  High-sensitivity serial troponin negative x 3, BNP 18  TSH 0.26  He was initially given 2 doses of IV metoprolol without improvement in heart rates and systolic blood pressure dropped into the 90s.  He was then loaded with IV digoxin, given another dose of IV metoprolol and ultimately converted to sinus rhythm at approximately 8 PM last night.  Cardiology asked to evaluate today.  Time my exam patient reports feeling much better.  He had resolution of his symptoms sometime last evening.  He denies any current chest pain palpitations dizziness lightheadedness or shortness of breath.    He has had intermittent palpitations since January of last year.  Was alerted on a watch to atrial fibrillation and sought treatment at the emergency department here.  By the time he got here is in sinus rhythm.  He did see cardiology as an outpatient and wore a 3-month Holter monitor which resulted in March 2024 that did not show any atrial fibrillation he did have 1 episode of SVT.   He also had a nonischemic stress echocardiogram this year as well.    He has a history of thyroid cancer with total  thyroidectomy; he is TSH and Synthroid is managed by [facility].    With screen for sleep apnea in 2017 which was negative.   He drinks excessive amounts of caffeine throughout the day.  Drinks healthy pore of bourbon every day except Sundays.     He reports being very disciplined when he needs to make lifestyle modifications and is very open to doing so.     He is not diabetic no prior MI CAD CHF, CVA, PE or DVT.  He has been told he has borderline blood pressure but he has never required treatment for hypertension.  Would like to transfer his care to [facility].     4/2024 Stress echo: Stress ecg negative for ischemia , stress echo negative for ischemia. LVEF 60-65%.     OP cardiac montior 8/2024: Sinus rhythm with one run of SVT; no atrial fibrillation.     EKG reviewed personally:  10/19/2024  Atrial fibrillation  Ventricular rate 153    Telemetry reviewed personally:   Atrial fibrillation with RVR, conversion to sinus rhythm at 8 PM on 10/19; no further events since then    Review of Systems   Constitutional: Negative for decreased appetite and fever.   Cardiovascular:  Negative for chest pain, dyspnea on exertion, leg swelling, orthopnea, palpitations and syncope.   Respiratory:  Negative for cough and shortness of breath.    Gastrointestinal:  Negative for abdominal pain, nausea and vomiting.   Genitourinary:  Negative for dysuria.   Neurological:  Negative for dizziness and light-headedness.   Psychiatric/Behavioral:  Negative for altered mental status.    All other systems reviewed and are negative.    Historical Information   Past Medical History:   Diagnosis Date    Papillary thyroid carcinoma (HCC) 02/23/2018    had total thyroidectomy    Recent URI     pt states is better - cough in am then subsides    Rosacea     Seasonal allergies     Varicose veins of right lower extremity      Past Surgical History:   Procedure Laterality Date    CYST REMOVAL      on back    HERNIA REPAIR  Left     inguinal with mesh    KNEE ARTHROSCOPY Right     OK STAB PHLEBT VARICOSE VEINS 1 XTR > 20 INCS Right 2023    Procedure: MULTIPLE STAB PHLEB;  Surgeon: Teddy Roberts DO;  Location: [facility];  Service: Vascular    SHOULDER ARTHROSCOPY Bilateral     TOTAL THYROIDECTOMY      WISDOM TOOTH EXTRACTION       Social History     Substance and Sexual Activity   Alcohol Use Yes    Alcohol/week: 0.0 standard drinks of alcohol    Comment: social     Social History     Substance and Sexual Activity   Drug Use No     Social History     Tobacco Use   Smoking Status Former    Current packs/day: 0.00    Types: Cigarettes    Quit date:     Years since quittin.8   Smokeless Tobacco Former    Types: Chew    Quit date: 2002     Family History: Family history non-contributory    Meds/Allergies   current meds:   Current Facility-Administered Medications:     acetaminophen (TYLENOL) tablet 650 mg, Q6H PRN    ascorbic acid (VITAMIN C) tablet 1,000 mg, Daily    aspirin chewable tablet 81 mg, Daily    Cholecalciferol (VITAMIN D3) tablet 2,000 Units, Daily    enoxaparin (LOVENOX) subcutaneous injection 40 mg, Daily    fish oil capsule 1,000 mg, Daily    levothyroxine tablet 150 mcg, Early Morning    [START ON 10/26/2024] levothyroxine tablet 75 mcg, Weekly    magnesium Oxide (MAG-OX) tablet 400 mg, Daily    metoprolol succinate (TOPROL-XL) 24 hr tablet 25 mg, Daily    multivitamin-minerals (CENTRUM) tablet 1 tablet, Daily    potassium chloride (Klor-Con M10) CR tablet 10 mEq, Daily       No Known Allergies    Objective   Vitals: Blood pressure 103/76, pulse 78, temperature 98.1 °F (36.7 °C), temperature source Oral, resp. rate 16, SpO2 95%.,     There is no height or weight on file to calculate BMI.,     Systolic (24hrs), Av , Min:99 , Max:135     Diastolic (24hrs), Av, Min:68, Max:93            Intake/Output Summary (Last 24 hours) at 10/20/2024 0828  Last data filed at 10/19/2024 2001  Gross per 24  hour   Intake 480 ml   Output --   Net 480 ml     Weight (last 2 days)       None          Invasive Devices       Peripheral Intravenous Line  Duration             Peripheral IV 10/19/24 Left Antecubital 1 day    Peripheral IV 10/19/24 Right Antecubital 1 day                      Physical Exam  Vitals reviewed.   Constitutional:       General: He is not in acute distress.     Appearance: Normal appearance.   HENT:      Head: Normocephalic.      Mouth/Throat:      Mouth: Mucous membranes are moist.   Cardiovascular:      Rate and Rhythm: Normal rate and regular rhythm.      Heart sounds: S1 normal and S2 normal. No murmur heard.  Pulmonary:      Effort: Pulmonary effort is normal.      Breath sounds: Normal breath sounds. No wheezing or rales.   Abdominal:      Palpations: Abdomen is soft.   Musculoskeletal:      Right lower leg: No edema.      Left lower leg: No edema.   Skin:     General: Skin is warm.   Neurological:      Mental Status: He is alert and oriented to person, place, and time.   Psychiatric:         Mood and Affect: Mood normal.       LABORATORY RESULTS:      CBC with diff:   Results from last 7 days   Lab Units 10/19/24  0734   WBC Thousand/uL 6.54   HEMOGLOBIN g/dL 16.2   HEMATOCRIT % 47.4   MCV fL 86   PLATELETS Thousands/uL 257   RBC Million/uL 5.52   MCH pg 29.3   MCHC g/dL 34.2   RDW % 12.1   MPV fL 9.6   NRBC AUTO /100 WBCs 0     CMP:  Results from last 7 days   Lab Units 10/20/24  0538 10/19/24  0734   POTASSIUM mmol/L 4.2 4.0   CHLORIDE mmol/L 108 106   CO2 mmol/L 26 25   BUN mg/dL 16 22   CREATININE mg/dL 0.80 0.92   CALCIUM mg/dL 8.6 9.6   AST U/L  --  18   ALT U/L  --  20   ALK PHOS U/L  --  36   EGFR ml/min/1.73sq m 101 94     BMP:  Results from last 7 days   Lab Units 10/20/24  0538 10/19/24  0734   POTASSIUM mmol/L 4.2 4.0   CHLORIDE mmol/L 108 106   CO2 mmol/L 26 25   BUN mg/dL 16 22   CREATININE mg/dL 0.80 0.92   CALCIUM mg/dL 8.6 9.6     Results from last 7 days   Lab Units  "10/20/24  0538 10/19/24  0734   MAGNESIUM mg/dL 2.1 2.1     Results from last 7 days   Lab Units 10/19/24  0734   TSH 3RD GENERATON uIU/mL 0.246*   FREE T4 ng/dL 0.96     Results from last 7 days   Lab Units 10/19/24  0734   INR  0.89     Lipid Profile:   No results found for: \"CHOL\"  Lab Results   Component Value Date    HDL 47 03/15/2024    HDL 50 03/31/2023    HDL 50 03/22/2022     Lab Results   Component Value Date    LDLCALC 127 (H) 03/15/2024    LDLCALC 112 (H) 03/31/2023    LDLCALC 108 (H) 03/22/2022     Lab Results   Component Value Date    TRIG 158 (H) 03/15/2024    TRIG 151 (H) 03/31/2023    TRIG 108 03/22/2022     XR chest 1 view portable    Result Date: 10/19/2024  Narrative: XR CHEST PORTABLE INDICATION: tachy, afib w/RVR. COMPARISON: None FINDINGS: Clear lungs. No pneumothorax or pleural effusion. Normal cardiomediastinal silhouette. Bones are unremarkable for age. Normal upper abdomen.     Impression: No acute cardiopulmonary disease. Workstation performed: [id number]     Assessment  Principal Problem:    New onset atrial fibrillation (HCC)  Active Problems:    Hypogonadotropic hypogonadism (HCC)    Postoperative hypothyroidism    Caffeine adverse reaction    Alcohol use    Snoring    Thank you for allowing us to participate in this patient's care. This pt will follow up with Dr Mckinnon once discharged.     Counseling / Coordination of Care  Total floor / unit time spent today 45 minutes.  Greater than 50% of total time was spent with the patient and / or family counseling and / or coordination of care.  A description of the counseling / coordination of care: Review of history, current assessment, development of a plan.      Code Status: Level 1 - Full Code    ** Please Note: Dragon 360 Dictation voice to text software may have been used in the creation of this document. **   "

## 2024-10-20 NOTE — ASSESSMENT & PLAN NOTE
TSH 0.26; endocrine consulted and adjusting TSH.  He will follow-up with his primary endocrinologist at [facility]

## 2024-10-20 NOTE — ASSESSMENT & PLAN NOTE
Status post total thyroidectomy due to papillary thyroid cancer  Followed by [facility] Tobar, per patient they want his TSH around 0   Last adjustment of Levothyroxine was in January   Continue Levothyroxine 150 mcg QD   Endocrinology advised skipping extra 75 mcg and following up with his outpatient Endocrinologist   Follow up T4

## 2024-10-20 NOTE — PLAN OF CARE
Problem: PAIN - ADULT  Goal: Verbalizes/displays adequate comfort level or baseline comfort level  Description: Interventions:  - Encourage patient to monitor pain and request assistance  - Assess pain using appropriate pain scale  - Administer analgesics based on type and severity of pain and evaluate response  - Implement non-pharmacological measures as appropriate and evaluate response  - Consider cultural and social influences on pain and pain management  - Notify physician/advanced practitioner if interventions unsuccessful or patient reports new pain  Outcome: Progressing     Problem: SAFETY ADULT  Goal: Patient will remain free of falls  Description: INTERVENTIONS:  - Educate patient/family on patient safety including physical limitations  - Instruct patient to call for assistance with activity   - Consult OT/PT to assist with strengthening/mobility   - Keep Call bell within reach  - Keep bed low and locked with side rails adjusted as appropriate  - Keep care items and personal belongings within reach  - Initiate and maintain comfort rounds  - Make Fall Risk Sign visible to staff    - Apply yellow socks and bracelet for high fall risk patients  - Consider moving patient to room near nurses station  Outcome: Progressing  Goal: Maintain or return to baseline ADL function  Description: INTERVENTIONS:  -  Assess patient's ability to carry out ADLs; assess patient's baseline for ADL function and identify physical deficits which impact ability to perform ADLs (bathing, care of mouth/teeth, toileting, grooming, dressing, etc.)  - Assess/evaluate cause of self-care deficits   - Assess range of motion  - Assess patient's mobility; develop plan if impaired  - Assess patient's need for assistive devices and provide as appropriate  - Encourage maximum independence but intervene and supervise when necessary  - Involve family in performance of ADLs  - Assess for home care needs following discharge   - Consider OT consult  to assist with ADL evaluation and planning for discharge  - Provide patient education as appropriate  Outcome: Progressing     Problem: DISCHARGE PLANNING  Goal: Discharge to home or other facility with appropriate resources  Description: INTERVENTIONS:  - Identify barriers to discharge w/patient and caregiver  - Arrange for needed discharge resources and transportation as appropriate  - Identify discharge learning needs (meds, wound care, etc.)  - Arrange for interpretive services to assist at discharge as needed  - Refer to Case Management Department for coordinating discharge planning if the patient needs post-hospital services based on physician/advanced practitioner order or complex needs related to functional status, cognitive ability, or social support system  Outcome: Progressing     Problem: Knowledge Deficit  Goal: Patient/family/caregiver demonstrates understanding of disease process, treatment plan, medications, and discharge instructions  Description: Complete learning assessment and assess knowledge base.  Interventions:  - Provide teaching at level of understanding  - Provide teaching via preferred learning methods  Outcome: Progressing

## 2024-10-20 NOTE — ASSESSMENT & PLAN NOTE
Status post total thyroidectomy due to papillary thyroid cancer  Followed by [facility] Tobar, per patient they want his TSH around 0   Last adjustment of Levothyroxine was in January   Continue Levothyroxine 150 mcg QD   Endocrinology advised skipping extra 75 mcg and following up with his outpatient Endocrinologist

## 2024-10-20 NOTE — UTILIZATION REVIEW
Initial Clinical Review    Admission: Date/Time/Statement:   Admission Orders (From admission, onward)       Ordered        10/19/24 1139  INPATIENT ADMISSION  Once                          Orders Placed This Encounter   Procedures    INPATIENT ADMISSION     Standing Status:   Standing     Number of Occurrences:   1     Order Specific Question:   Level of Care     Answer:   Med Surg [16]     Order Specific Question:   Estimated length of stay     Answer:   More than 2 Midnights     Order Specific Question:   Certification     Answer:   I certify that inpatient services are medically necessary for this patient for a duration of greater than two midnights. See H&P and MD Progress Notes for additional information about the patient's course of treatment.     ED Arrival Information       Expected   -    Arrival   10/19/2024 07:06    Acuity   Emergent              Means of arrival   Walk-In    Escorted by   Spouse    Service   Hospitalist    Admission type   Emergency              Arrival complaint   Heart racing/ high BP             Chief Complaint   Patient presents with    Palpitations     Pt woke up 3 hrs ago with palpitations and chest discomfort. Denies sob. No cardiac hx       Initial Presentation: 54 y.o. male  to ED via walk in from home.    Admitted to inpatient with Dx:  New onset atrial fibrillation with RVR/Postop hypothyroidism and hypogonadotropic hypogonadism/alcohol and caffeine use.  Drinks 1 - 2 drinks nightly.   Coffee all day.    Presented to ED with palpitations starting the morning of arrival, chest pressure and lightheaded.  . PMHx:atrial fib about 8 months ago.   On exam:  tachycardia.  Rhythm irregular.   TSH 0.246.   monitor rapid atrial fib.   ED treatment:  IVF bolus of 1 liter, IV metoprolol x 2 IV Dig. .    Plan includes telemetry.  Consult Cardiology. Echo.  Complete Dig load.   Asa.   Continue Levothyroxine.  Follow up T 4 and consult endocrine.   Continue Clomid.      Anticipated Length  of Stay/Certification Statement: Patient will be admitted on an inpatient basis with an anticipated length of stay of greater than 2 midnights secondary to as per above assessment and plan .     10/19/24 per Endocrine -  Papillary thyroid Carcinoma. He has AJCC stage 1 rt lower pole 2.8cm PTC w/o ETE or LN involvement. S/p total thyroidectomy 7/24/2017 c central node dissection. Never needed CARR therapy. Baseline Tg was undetectable. He has been stable with an undetectable Tg and neg Tg Claudia since surgery.  Current suppressive therapy - levothyroxine 150mch daily M-S and 225mcg Sunday.   He may use levothyroxine 150mcg qdaily and skip the extra 75mcg qdaily in context of new onset atrial fibrillation and ability to relac thyroid suppressive therapy 7 yrs after initial diagnosis based on risk vs benefit.     Date:  10/20/24    Day 2:   has converted to sinus.   No focal deficits on exam.   Continue telemetry. Echo.  Has completed Dig load.   Start Toprol CL.  Asa.   Continue levothyroxine.     Per Cardiology 10/20/24:  PA/elevated BP with no diagnosis of hypertension.  .  Converted to sinus overnight.   Begin metoprolol succinate 25 mg daily. C2 V score is 0-1 (no formal diagnosis of HTN per patient) and anticoagulation is not indicated at this time.  Consider 2 week cardiac monitor.  Recommend OP sleep study.        ED Treatment-Medication Administration from 10/19/2024 0705 to 10/19/2024 1311         Date/Time Order Dose Route Action     10/19/2024 0736 multi-electrolyte (ISOLYTE-S PH 7.4) bolus 1,000 mL 1,000 mL Intravenous New Bag     10/19/2024 0839 metoprolol (LOPRESSOR) injection 5 mg 5 mg Intravenous Given     10/19/2024 0917 metoprolol (LOPRESSOR) injection 5 mg 5 mg Intravenous Given     10/19/2024 1028 digoxin (LANOXIN) injection 500 mcg 500 mcg Intravenous Given            Scheduled Medications:  vitamin C, 1,000 mg, Oral, Daily  aspirin, 81 mg, Oral, Daily  Cholecalciferol, 2,000 Units, Oral,  Daily  enoxaparin, 40 mg, Subcutaneous, Daily  fish oil, 1,000 mg, Oral, Daily  levothyroxine, 150 mcg, Oral, Early Morning  magnesium Oxide, 400 mg, Oral, Daily  metoprolol succinate, 25 mg, Oral, Daily  multivitamin-minerals, 1 tablet, Oral, Daily  potassium chloride, 10 mEq, Oral, Daily    digoxin (LANOXIN) injection 250 mcg  Dose: 250 mcg  Freq: Every 6 hours Route: IV  Indications of Use: ATRIAL FIBRILLATION  Start: 10/19/24 1600 End: 10/19/24 2200  metoprolol (LOPRESSOR) injection 5 mg  Dose: 5 mg  Freq: Once Route: IV  Start: 10/19/24 1945 End: 10/19/24 1948    Continuous IV Infusions:     PRN Meds:  acetaminophen, 650 mg, Oral, Q6H PRN - x 1 10/20      ED Triage Vitals   Temperature Pulse Respirations Blood Pressure SpO2 Pain Score   10/19/24 0719 10/19/24 0712 10/19/24 0712 10/19/24 0712 10/19/24 0712 10/19/24 1317   (!) 97.4 °F (36.3 °C) 105 18 152/78 98 % No Pain     Weight (last 2 days)       Date/Time Weight    10/20/24 1450 83.9 (185)            Vital Signs (last 3 days)       Date/Time Temp Pulse Resp BP MAP (mmHg) SpO2 O2 Device Patient Position - Orthostatic VS Pain    10/20/24 1450 -- 87 -- 143/97 -- -- -- -- --    10/20/24 10:11:38 -- 87 -- 143/97 112 96 % -- -- --    10/20/24 0900 -- -- -- -- -- -- -- -- No Pain    10/20/24 08:31:45 97.4 °F (36.3 °C) 79 -- 164/94 117 98 % -- -- --    10/19/24 20:54:01 98.1 °F (36.7 °C) 78 16 103/76 85 95 % None (Room air) Lying No Pain    10/19/24 2045 -- -- -- -- -- -- -- -- No Pain    10/19/24 20:28:31 -- 74 -- 121/84 96 97 % None (Room air) Lying --    10/19/24 19:48:23 -- 127 -- 135/93 107 97 % None (Room air) Sitting --    10/19/24 1926 -- 158 -- -- -- -- -- -- --    10/19/24 1922 -- -- -- -- -- -- -- -- 3    10/19/24 1730 -- 139 -- -- -- -- -- -- --    10/19/24 15:45:14 98.2 °F (36.8 °C) 64 -- 124/91 102 93 % -- -- --    10/19/24 1317 -- -- -- -- -- -- -- -- No Pain    10/19/24 13:16:44 98.2 °F (36.8 °C) 77 -- 115/84 94 96 % -- -- --    10/19/24 1300 -- 142  -- 125/83 92 94 % -- -- --    10/19/24 1245 -- 141 -- 128/72 88 94 % -- -- --    10/19/24 1230 -- 142 -- 133/83 99 95 % -- -- --    10/19/24 1200 -- 140 18 131/88 105 95 % None (Room air) Lying --    10/19/24 1100 -- 142 18 120/87 98 95 % None (Room air) Lying --    10/19/24 1045 -- 139 16 118/83 96 95 % -- -- --    10/19/24 1030 -- 141 17 108/82 92 97 % -- -- --    10/19/24 1015 -- 139 16 106/77 87 95 % -- -- --    10/19/24 1000 -- 144 16 99/72 82 96 % -- -- --    10/19/24 0900 -- 140 18 112/74 89 96 % -- -- --    10/19/24 0846 -- 141 19 117/68 86 97 % -- -- --    10/19/24 0815 -- 155 18 124/67 87 96 % -- -- --    10/19/24 0745 -- 151 18 135/76 101 95 % None (Room air) -- --    10/19/24 0727 -- 158 18 139/86 -- 100 % None (Room air) Lying --    10/19/24 0719 97.4 °F (36.3 °C) -- -- -- -- -- -- -- --    10/19/24 0717 -- 158 -- -- -- -- -- -- --    10/19/24 0712 -- 105 18 152/78 -- 98 % None (Room air) Sitting --              Pertinent Labs/Diagnostic Test Results:   Radiology:  XR chest 1 view portable   Final Interpretation by Veronica Ray MD (10/19 1938)      No acute cardiopulmonary disease.            Workstation performed: [id number]           Cardiology:  Echo complete w/ contrast if indicated   Final Result by Kesha Mckinnon MD (10/20 8129)        Left Ventricle: Left ventricular cavity size is normal. Wall thickness    is normal. The left ventricular ejection fraction is 60%. Systolic    function is normal. Wall motion is normal. Diastolic function is normal.     Right Ventricle: Right ventricular cavity size is normal. Systolic    function is normal.     Left Atrium: The atrium is mildly dilated (35-41 mL/m2).     No significant valvular abnormalities.             Date/Time: 10/19/2024 10:01 AM  Indications / Diagnosis:  Tachycardia, palpitations  ECG reviewed by  the ED Provider: yes    Patient location:  ED  Previous ECG:     Previous ECG:  Compared to current    Comparison ECG info:  February  22, 2024    Similarity:  Changes noted    Comparison to cardiac monitor: Yes    Interpretation:     Interpretation: abnormal    Rate:     ECG rate:  153    ECG rate assessment: tachycardic    Rhythm:     Rhythm: atrial fibrillation    Ectopy:     Ectopy: PVCs    QRS:     QRS axis:  Normal  Conduction:     Conduction: abnormal    Comments:      A-fib with RVR rate 153.  No ST/T-segment changes negative for acute ischemia.    Results from last 7 days   Lab Units 10/19/24  0734   WBC Thousand/uL 6.54   HEMOGLOBIN g/dL 16.2   HEMATOCRIT % 47.4   PLATELETS Thousands/uL 257   TOTAL NEUT ABS Thousands/µL 3.96     Results from last 7 days   Lab Units 10/20/24  0538 10/19/24  0734   SODIUM mmol/L 139 140   POTASSIUM mmol/L 4.2 4.0   CHLORIDE mmol/L 108 106   CO2 mmol/L 26 25   ANION GAP mmol/L 5 9   BUN mg/dL 16 22   CREATININE mg/dL 0.80 0.92   EGFR ml/min/1.73sq m 101 94   CALCIUM mg/dL 8.6 9.6   MAGNESIUM mg/dL 2.1 2.1     Results from last 7 days   Lab Units 10/19/24  0734   AST U/L 18   ALT U/L 20   ALK PHOS U/L 36   TOTAL PROTEIN g/dL 7.1   ALBUMIN g/dL 4.6   TOTAL BILIRUBIN mg/dL 0.38     Results from last 7 days   Lab Units 10/20/24  0538 10/19/24  0734   GLUCOSE RANDOM mg/dL 96 117     Results from last 7 days   Lab Units 10/19/24  1139 10/19/24  0922 10/19/24  0734   HS TNI 0HR ng/L  --   --  12   HS TNI 2HR ng/L  --  19  --    HSTNI D2 ng/L  --  7  --    HS TNI 4HR ng/L 28  --   --    HSTNI D4 ng/L 16  --   --      Results from last 7 days   Lab Units 10/19/24  0734   D-DIMER QUANTITATIVE ug/ml FEU 0.27     Results from last 7 days   Lab Units 10/19/24  0734   PROTIME seconds 12.7   INR  0.89   PTT seconds 29     Results from last 7 days   Lab Units 10/19/24  0734   TSH 3RD GENERATON uIU/mL 0.246*     Results from last 7 days   Lab Units 10/19/24  0734   BNP pg/mL 18       Past Medical History:   Diagnosis Date    Papillary thyroid carcinoma (HCC) 02/23/2018    had total thyroidectomy    Recent URI     pt states  is better - cough in am then subsides    Rosacea     Seasonal allergies     Varicose veins of right lower extremity      Present on Admission:   Postoperative hypothyroidism   Hypogonadotropic hypogonadism (HCC)   New onset atrial fibrillation (HCC)   Caffeine adverse reaction   Alcohol use   Snoring    Admitting Diagnosis: Racing heart beat [R00.0]  Postoperative hypothyroidism [E89.0]  New onset atrial fibrillation (HCC) [I48.91]  Atrial fibrillation with rapid ventricular response (HCC) [I48.91]  Age/Sex: 54 y.o. male    Network Utilization Review Department  ATTENTION: Please call with any questions or concerns to [phone] and carefully listen to the prompts so that you are directed to the right person. All voicemails are confidential.   For Discharge needs, contact Care Management DC Support Team at [phone] opt. [phone]  Send all requests for admission clinical reviews, approved or denied determinations and any other requests to dedicated fax number below belonging to the [facility] where the patient is receiving treatment. List of dedicated fax numbers for the Facilities:  FACILITY NAME UR FAX NUMBER   ADMISSION DENIALS (Administrative/Medical Necessity) [phone]   DISCHARGE SUPPORT TEAM (NETWORK) [phone]   PARENT CHILD HEALTH (Maternity/NICU/Pediatrics) [phone]   [facility] [phone]   [facility] [phone]   [facility] [phone]   [facility] [phone]   [facility] [phone]   [facility] [phone]   [facility] [phone]   [facility] [phone]   [facility] [phone]   [facility] [phone]   [facility] [phone]   [facility]  [phone]

## 2024-10-21 LAB
ATRIAL RATE: 73 BPM
P AXIS: 56 DEGREES
PR INTERVAL: 168 MS
QRS AXIS: -12 DEGREES
QRSD INTERVAL: 94 MS
QT INTERVAL: 380 MS
QTC INTERVAL: 418 MS
T WAVE AXIS: -1 DEGREES
VENTRICULAR RATE: 73 BPM

## 2024-10-21 PROCEDURE — 93010 ELECTROCARDIOGRAM REPORT: CPT | Performed by: INTERNAL MEDICINE

## 2024-10-24 ENCOUNTER — TRANSITIONAL CARE MANAGEMENT (OUTPATIENT)
Dept: FAMILY MEDICINE CLINIC | Facility: CLINIC | Age: 54
End: 2024-10-24

## 2024-11-04 NOTE — PROGRESS NOTES
Cardiology Outpatient Progress Note - Varun Lopez 54 y.o. male MRN: [id number]    @ Encounter: [id number]      Assessment/Plan:    Patient Active Problem List    Diagnosis Date Noted    New onset atrial fibrillation (HCC) 10/19/2024    Caffeine adverse reaction 10/19/2024    Alcohol use 10/19/2024    Snoring 10/19/2024    Varicose veins of right lower extremity with other complications 04/14/2023    Nocturia associated with benign prostatic hyperplasia 04/14/2023    Plantar fasciitis, left 04/08/2022    Rosacea 04/23/2021    Physical exam, annual 03/11/2021    Deviated nasal septum 09/03/2020    Primary exertional headache 09/02/2020    Family hx of prostate cancer 08/27/2019    Hypogonadotropic hypogonadism (HCC) 02/23/2018    Erectile dysfunction of non-organic origin 02/23/2018    Postoperative hypothyroidism 02/23/2018    Hypercholesterolemia 09/05/2012       PAF  -RRR on exam today.   -Rate metoprolol succinate 25 mg daily  -Rhythm NA  -OAC Only on aspirin 81 mg daily given low [id number]    -OP cardiac montior 8/2024: Sinus rhythm with one run of SVT; no atrial fibrillation.     HTN  -reasonably controlled today  -advised continued home monitoring   -4/2024 Stress echo: Stress ecg negative for ischemia , stress echo negative for ischemia. LVEF 60-65%.     Alcohol use  -Cannon daily  -now cutting back    History of thyroid cancer.  -Total thyroidectomy  -Managed by [facility]  -Most recent TSH 0.26 but with normal free T4.  Endocrinology consulted during admission     GELA  -Sleep study in 2017 negative    HPI:  54-year-old male with past medical history as described above who presents for hospital follow-up.  Was recently admitted with new recurrent atrial fibrillation with a ventricular rate of 153 bpm.  Was initially treated with IV metoprolol but with minimal response.  Was then loaded with IV digoxin and ultimately converted to sinus rhythm.  Was found to have TSH of 0.26 with normal free T4.   This was addressed by inpatient endocrinology team.  Patient was counseled on excessive caffeine and alcohol use as it was felt these were likely triggers for his clinical symptoms and presentation.  He was discharged home on metoprolol succinate 25 mg daily and a baby aspirin given low [id number] score.  He is feeling well today with no cardiac complaints.  States he has had no breakthrough palpitations or rapid heartbeat.  He has cut back substantially on his bourbon and caffeine intake.  Some lifestyle changes as a relates to diet and exercise.  Working on reducing stress.  Past Medical History:   Diagnosis Date    Papillary thyroid carcinoma (HCC) 02/23/2018    had total thyroidectomy    Recent URI     pt states is better - cough in am then subsides    Rosacea     Seasonal allergies     Varicose veins of right lower extremity        12 point ROS negative other than that stated in HPI    No Known Allergies  .    Current Outpatient Medications:     Ascorbic Acid (VITAMIN C) 1000 MG tablet, Take 1,000 mg by mouth daily, Disp: , Rfl:     aspirin 81 mg chewable tablet, Chew 1 tablet (81 mg total) daily, Disp: 30 tablet, Rfl: 0    Cholecalciferol (VITAMIN D) 2000 units CAPS, Take 1 capsule by mouth daily, Disp: , Rfl:     clomiPHENE (Clomid) 50 mg tablet, TAKE 1/2 TABLET BY MOUTH EVERY OTHER DAY, Disp: 23 tablet, Rfl: 2    Magnesium 100 MG TABS, Take by mouth daily, Disp: , Rfl:     metoprolol succinate (TOPROL-XL) 25 mg 24 hr tablet, Take 1 tablet (25 mg total) by mouth daily, Disp: 30 tablet, Rfl: 0    metroNIDAZOLE (METROCREAM) 0.75 % cream, Apply topically 2 (two) times a day (Patient taking differently: Apply topically 2 (two) times a day as needed), Disp: 45 g, Rfl: 3    Multiple Vitamins-Minerals (ZINC PO), Take by mouth daily, Disp: , Rfl:     Omega-3 Fatty Acids (FISH OIL) 645 MG CAPS, Take by mouth daily, Disp: , Rfl:     Potassium 99 MG TABS, Take by mouth daily, Disp: , Rfl:     Synthroid 150 MCG  tablet, Take 1 tablet (150 mcg total) by mouth daily 150 mcg 6x week and extra 75 mcg on , Disp: 30 tablet, Rfl: 0    Zn-Pyg Afri-Nettle-Saw Palmet (SAW PALMETTO COMPLEX PO), Take by mouth, Disp: , Rfl:     Social History     Socioeconomic History    Marital status: /Civil Union     Spouse name: Not on file    Number of children: Not on file    Years of education: Not on file    Highest education level: Not on file   Occupational History    Not on file   Tobacco Use    Smoking status: Former     Current packs/day: 0.00     Types: Cigarettes     Quit date:      Years since quittin.8    Smokeless tobacco: Former     Types: Chew     Quit date: 2002   Vaping Use    Vaping status: Never Used   Substance and Sexual Activity    Alcohol use: Yes     Alcohol/week: 0.0 standard drinks of alcohol     Comment: social    Drug use: No    Sexual activity: Yes     Partners: Female     Comment: , vasectomy   Other Topics Concern    Not on file   Social History Narrative    Not on file     Social Determinants of Health     Financial Resource Strain: Not on file   Food Insecurity: Not on file   Transportation Needs: Not on file   Physical Activity: Not on file   Stress: Not on file   Social Connections: Not on file   Intimate Partner Violence: Not on file   Housing Stability: Not on file       Family History   Problem Relation Age of Onset    Prostate cancer Father         Prostate removed    Hypertension Father         on meds    Diabetes Maternal Grandfather     Prostate cancer Paternal Grandfather        Physical Exam:    Vitals: /91 (BP Location: Left arm, Patient Position: Sitting)   Pulse 80   Wt 85.3 kg (188 lb)   SpO2 97%   BMI 29.44 kg/m²     Wt Readings from Last 3 Encounters:   10/20/24 83.9 kg (185 lb)   24 83.9 kg (185 lb)   24 86.5 kg (190 lb 11.2 oz)       GEN: Varun Lopez appears well, alert and oriented x 3, pleasant and cooperative   HEENT: pupils equal,  round, and reactive to light; extraocular muscles intact  NECK: supple, no carotid bruits   HEART: regular rhythm, normal S1 and S2, no murmurs, clicks, gallops or rubs, JVP is  flat  LUNGS: clear to auscultation bilaterally; no wheezes, rales, or rhonchi   ABDOMEN: normal bowel sounds, soft, no tenderness, no distention  EXTREMITIES: peripheral pulses normal; no clubbing, cyanosis, or edema  NEURO: no focal findings   SKIN: normal without suspicious lesions on exposed skin    Labs & Results:  Lab Results   Component Value Date     06/16/2017    SODIUM 139 10/20/2024    K 4.2 10/20/2024     10/20/2024    CO2 26 10/20/2024    AGAP 5 10/20/2024    BUN 16 10/20/2024    CREATININE 0.80 10/20/2024    GLUC 96 10/20/2024    CALCIUM 8.6 10/20/2024    AST 18 10/19/2024    ALT 20 10/19/2024    ALKPHOS 36 10/19/2024    PROT 6.2 06/16/2017    TP 7.1 10/19/2024    BILITOT 0.7 06/16/2017    TBILI 0.38 10/19/2024    EGFR 101 10/20/2024     Lab Results   Component Value Date    WBC 6.54 10/19/2024    HGB 16.2 10/19/2024    HCT 47.4 10/19/2024    MCV 86 10/19/2024     10/19/2024     Lab Results   Component Value Date    BNP 18 10/19/2024      Lab Results   Component Value Date    LDLCALC 127 (H) 03/15/2024     Lab Results   Component Value Date    [id number] 0.246 (L) 10/19/2024     Lab Results   Component Value Date    HGBA1C 4.8 02/20/2018         EKG personally reviewed by INGA Fu.   No results found for this visit on 11/05/24.     Counseling / Coordination of Care  Total face to face time spent with patient 20 minutes.  An additional 10 minutes was spent for chart/data review and visit preparation.       Thank you for the opportunity to participate in the care of this patient.    INGA Fu

## 2024-11-05 ENCOUNTER — OFFICE VISIT (OUTPATIENT)
Dept: CARDIOLOGY CLINIC | Facility: CLINIC | Age: 54
End: 2024-11-05
Payer: COMMERCIAL

## 2024-11-05 VITALS
BODY MASS INDEX: 29.44 KG/M2 | HEART RATE: 80 BPM | SYSTOLIC BLOOD PRESSURE: 131 MMHG | WEIGHT: 188 LBS | DIASTOLIC BLOOD PRESSURE: 91 MMHG | OXYGEN SATURATION: 97 %

## 2024-11-05 DIAGNOSIS — I48.91 NEW ONSET ATRIAL FIBRILLATION (HCC): ICD-10-CM

## 2024-11-05 DIAGNOSIS — I48.91 ATRIAL FIBRILLATION WITH RAPID VENTRICULAR RESPONSE (HCC): ICD-10-CM

## 2024-11-05 PROCEDURE — 99214 OFFICE O/P EST MOD 30 MIN: CPT | Performed by: NURSE PRACTITIONER

## 2024-11-05 RX ORDER — METOPROLOL SUCCINATE 25 MG/1
25 TABLET, EXTENDED RELEASE ORAL DAILY
Qty: 30 TABLET | Refills: 5 | Status: SHIPPED | OUTPATIENT
Start: 2024-11-05 | End: 2024-11-15 | Stop reason: SDUPTHER

## 2024-11-14 DIAGNOSIS — L71.9 ROSACEA: ICD-10-CM

## 2024-11-15 DIAGNOSIS — I48.91 NEW ONSET ATRIAL FIBRILLATION (HCC): ICD-10-CM

## 2024-11-15 DIAGNOSIS — I48.91 ATRIAL FIBRILLATION WITH RAPID VENTRICULAR RESPONSE (HCC): ICD-10-CM

## 2024-11-15 RX ORDER — ASPIRIN 81 MG/1
81 TABLET, CHEWABLE ORAL DAILY
Qty: 90 TABLET | Refills: 1 | Status: SHIPPED | OUTPATIENT
Start: 2024-11-15

## 2024-11-15 RX ORDER — METOPROLOL SUCCINATE 25 MG/1
25 TABLET, EXTENDED RELEASE ORAL DAILY
Qty: 90 TABLET | Refills: 1 | Status: SHIPPED | OUTPATIENT
Start: 2024-11-15

## 2024-11-15 NOTE — TELEPHONE ENCOUNTER
Medication: aspirin 81 mg    Dose/Frequency: 1 tablet daily    Quantity: 90 tablets    Medication: metoprolol succinate 25mg tablet    Dose/Frequency: 1 tablet daily    Quantity: 90 tablets    Pharmacy: [facility]    Office:   [] PCP/Provider -   [x] Speciality/Provider -     Does the patient have enough for 3 days?   [x] Yes   [] No - Send as HP to POD

## 2024-12-29 ENCOUNTER — HOSPITAL ENCOUNTER (EMERGENCY)
Facility: HOSPITAL | Age: 54
Discharge: HOME | End: 2024-12-29
Attending: EMERGENCY MEDICINE | Admitting: EMERGENCY MEDICINE
Payer: COMMERCIAL

## 2024-12-29 ENCOUNTER — APPOINTMENT (EMERGENCY)
Dept: RADIOLOGY | Facility: HOSPITAL | Age: 54
End: 2024-12-29
Payer: COMMERCIAL

## 2024-12-29 VITALS
SYSTOLIC BLOOD PRESSURE: 181 MMHG | OXYGEN SATURATION: 97 % | RESPIRATION RATE: 20 BRPM | DIASTOLIC BLOOD PRESSURE: 93 MMHG | TEMPERATURE: 98.1 F | HEART RATE: 69 BPM

## 2024-12-29 DIAGNOSIS — R07.9 CHEST PAIN, UNSPECIFIED TYPE: ICD-10-CM

## 2024-12-29 DIAGNOSIS — R00.2 PALPITATIONS: ICD-10-CM

## 2024-12-29 DIAGNOSIS — R00.2 HEART PALPITATIONS: ICD-10-CM

## 2024-12-29 DIAGNOSIS — R55 NEAR SYNCOPE: Primary | ICD-10-CM

## 2024-12-29 LAB
ALBUMIN SERPL BCG-MCNC: 4.6 G/DL (ref 3.5–5)
ALP SERPL-CCNC: 35 U/L (ref 34–104)
ALT SERPL W P-5'-P-CCNC: 34 U/L (ref 7–52)
ANION GAP SERPL CALCULATED.3IONS-SCNC: 7 MMOL/L (ref 4–13)
APTT PPP: 29 SECONDS (ref 23–34)
AST SERPL W P-5'-P-CCNC: 28 U/L (ref 13–39)
ATRIAL RATE: 75 BPM
BASOPHILS # BLD AUTO: 0.03 THOUSANDS/ΜL (ref 0–0.1)
BASOPHILS NFR BLD AUTO: 1 % (ref 0–1)
BILIRUB SERPL-MCNC: 0.62 MG/DL (ref 0.2–1)
BUN SERPL-MCNC: 16 MG/DL (ref 5–25)
CALCIUM SERPL-MCNC: 9.7 MG/DL (ref 8.4–10.2)
CARDIAC TROPONIN I PNL SERPL HS: 8 NG/L (ref ?–50)
CHLORIDE SERPL-SCNC: 106 MMOL/L (ref 96–108)
CO2 SERPL-SCNC: 26 MMOL/L (ref 21–32)
CREAT SERPL-MCNC: 0.83 MG/DL (ref 0.6–1.3)
EOSINOPHIL # BLD AUTO: 0.19 THOUSAND/ΜL (ref 0–0.61)
EOSINOPHIL NFR BLD AUTO: 4 % (ref 0–6)
ERYTHROCYTE [DISTWIDTH] IN BLOOD BY AUTOMATED COUNT: 12.4 % (ref 11.6–15.1)
GFR SERPL CREATININE-BSD FRML MDRD: 100 ML/MIN/1.73SQ M
GLUCOSE SERPL-MCNC: 95 MG/DL (ref 65–140)
HCT VFR BLD AUTO: 44.4 % (ref 36.5–49.3)
HGB BLD-MCNC: 15.5 G/DL (ref 12–17)
IMM GRANULOCYTES # BLD AUTO: 0.02 THOUSAND/UL (ref 0–0.2)
IMM GRANULOCYTES NFR BLD AUTO: 0 % (ref 0–2)
INR PPP: 1.01 (ref 0.85–1.19)
LYMPHOCYTES # BLD AUTO: 1.54 THOUSANDS/ΜL (ref 0.6–4.47)
LYMPHOCYTES NFR BLD AUTO: 33 % (ref 14–44)
MCH RBC QN AUTO: 29.6 PG (ref 26.8–34.3)
MCHC RBC AUTO-ENTMCNC: 34.9 G/DL (ref 31.4–37.4)
MCV RBC AUTO: 85 FL (ref 82–98)
MONOCYTES # BLD AUTO: 0.39 THOUSAND/ΜL (ref 0.17–1.22)
MONOCYTES NFR BLD AUTO: 8 % (ref 4–12)
NEUTROPHILS # BLD AUTO: 2.54 THOUSANDS/ΜL (ref 1.85–7.62)
NEUTS SEG NFR BLD AUTO: 54 % (ref 43–75)
NRBC BLD AUTO-RTO: 0 /100 WBCS
P AXIS: 59 DEGREES
PLATELET # BLD AUTO: 225 THOUSANDS/UL (ref 149–390)
PMV BLD AUTO: 9.3 FL (ref 8.9–12.7)
POTASSIUM SERPL-SCNC: 3.9 MMOL/L (ref 3.5–5.3)
PR INTERVAL: 162 MS
PROT SERPL-MCNC: 6.6 G/DL (ref 6.4–8.4)
PROTHROMBIN TIME: 14 SECONDS (ref 12.3–15)
QRS AXIS: -11 DEGREES
QRSD INTERVAL: 94 MS
QT INTERVAL: 388 MS
QTC INTERVAL: 433 MS
RBC # BLD AUTO: 5.24 MILLION/UL (ref 3.88–5.62)
SODIUM SERPL-SCNC: 139 MMOL/L (ref 135–147)
T WAVE AXIS: 15 DEGREES
T4 FREE SERPL-MCNC: 1.25 NG/DL (ref 0.61–1.12)
TSH SERPL DL<=0.05 MIU/L-ACNC: 0.08 UIU/ML (ref 0.45–4.5)
VENTRICULAR RATE: 75 BPM
WBC # BLD AUTO: 4.71 THOUSAND/UL (ref 4.31–10.16)

## 2024-12-29 PROCEDURE — 85610 PROTHROMBIN TIME: CPT | Performed by: EMERGENCY MEDICINE

## 2024-12-29 PROCEDURE — 84484 ASSAY OF TROPONIN QUANT: CPT | Performed by: EMERGENCY MEDICINE

## 2024-12-29 PROCEDURE — 85730 THROMBOPLASTIN TIME PARTIAL: CPT | Performed by: EMERGENCY MEDICINE

## 2024-12-29 PROCEDURE — 71045 X-RAY EXAM CHEST 1 VIEW: CPT

## 2024-12-29 PROCEDURE — 99285 EMERGENCY DEPT VISIT HI MDM: CPT | Performed by: EMERGENCY MEDICINE

## 2024-12-29 PROCEDURE — 99285 EMERGENCY DEPT VISIT HI MDM: CPT

## 2024-12-29 PROCEDURE — 36415 COLL VENOUS BLD VENIPUNCTURE: CPT | Performed by: EMERGENCY MEDICINE

## 2024-12-29 PROCEDURE — 93005 ELECTROCARDIOGRAM TRACING: CPT

## 2024-12-29 PROCEDURE — 84439 ASSAY OF FREE THYROXINE: CPT | Performed by: EMERGENCY MEDICINE

## 2024-12-29 PROCEDURE — 84443 ASSAY THYROID STIM HORMONE: CPT | Performed by: EMERGENCY MEDICINE

## 2024-12-29 PROCEDURE — 85025 COMPLETE CBC W/AUTO DIFF WBC: CPT | Performed by: EMERGENCY MEDICINE

## 2024-12-29 PROCEDURE — 80053 COMPREHEN METABOLIC PANEL: CPT | Performed by: EMERGENCY MEDICINE

## 2024-12-29 NOTE — ED PROVIDER NOTES
Time reflects when diagnosis was documented in both MDM as applicable and the Disposition within this note       Time User Action Codes Description Comment    12/29/2024  1:47 PM Agresti, Elroy J Add [R00.2] Palpitations     12/29/2024  1:47 PM Agresti, Elroy J Add [R00.2] Heart palpitations     12/29/2024  1:47 PM Agresti, Elroy J Add [R07.9] Chest pain, unspecified type     12/29/2024  1:47 PM Agresti, Elroy J Add [R55] Near syncope     12/29/2024  1:47 PM Agresti, Elroy J Modify [R00.2] Palpitations     12/29/2024  1:47 PM Agresti, Elroy J Modify [R55] Near syncope           ED Disposition       ED Disposition   Discharge    Condition   Stable    Date/Time   Sun Dec 29, 2024  1:47 PM    Comment   Varun Lopez discharge to home/self care.                   Assessment & Plan       Medical Decision Making        Initial ED assessment:   54-year-old male, episode of dizziness, lightheadedness, earlier today at [employer], felt like he was get a pass out symptoms resolved few minutes later, has been having intermittent palpitations and chest pain and occasional shortness of breath, currently does not have the symptoms feels asymptomatic at time of exam.  History of atrial fibrillation.    Pathology at risk for includes but is not limited to:   Paroxysmal atrial fibrillation, SVT, other arrhythmia, less likely ACS.,  Consider hyperthyroidism,     Initial ED plan:   blood work, chest x-ray, ultimate dispo pending ED workup        Final ED summary/disposition:   After evaluation and workup in the emergency department, Ultimately found to have elevated T4.,  He is following with his endocrinologist, no cardiac etiology found.  Patient discharged will follow with outpatient providers.  advised him to call and follow-up with cardiology consider outpatient Holter monitor.    Amount and/or Complexity of Data Reviewed  Labs: ordered.  Radiology: ordered and independent interpretation performed.             Medications - No  data to display    ED Risk Strat Scores   HEART Risk Score      Flowsheet Row Most Recent Value   Heart Score Risk Calculator    History 0 Filed at: 12/29/2024 1652   ECG 0 Filed at: 12/29/2024 1652   Age 1 Filed at: 12/29/2024 1652   Risk Factors 1 Filed at: 12/29/2024 1652   Troponin 0 Filed at: 12/29/2024 1652   HEART Score 2 Filed at: 12/29/2024 1652          HEART Risk Score      Flowsheet Row Most Recent Value   Heart Score Risk Calculator    History 0 Filed at: 12/29/2024 1652   ECG 0 Filed at: 12/29/2024 1652   Age 1 Filed at: 12/29/2024 1652   Risk Factors 1 Filed at: 12/29/2024 1652   Troponin 0 Filed at: 12/29/2024 1652   HEART Score 2 Filed at: 12/29/2024 1652                                                History of Present Illness       Chief Complaint   Patient presents with    Palpitations     Pt presents with palpitations, dizziness, CP since this morning. Hx afib, states compliant w meds       Past Medical History:   Diagnosis Date    Papillary thyroid carcinoma (HCC) 02/23/2018    had total thyroidectomy    Recent URI     pt states is better - cough in am then subsides    Rosacea     Seasonal allergies     Varicose veins of right lower extremity       Past Surgical History:   Procedure Laterality Date    CYST REMOVAL      on back    HERNIA REPAIR Left     inguinal with mesh    KNEE ARTHROSCOPY Right     CT STAB PHLEBT VARICOSE VEINS 1 XTR > 20 INCS Right 11/13/2023    Procedure: MULTIPLE STAB PHLEB;  Surgeon: Teddy Roberts DO;  Location: [facility];  Service: Vascular    SHOULDER ARTHROSCOPY Bilateral     TOTAL THYROIDECTOMY      WISDOM TOOTH EXTRACTION        Family History   Problem Relation Age of Onset    Prostate cancer Father         Prostate removed    Hypertension Father         on meds    Diabetes Maternal Grandfather     Prostate cancer Paternal Grandfather       Social History     Tobacco Use    Smoking status: Former     Current packs/day: 0.00     Types: Cigarettes     Quit date:  2002     Years since quittin.0    Smokeless tobacco: Former     Types: Chew     Quit date: 2002   Vaping Use    Vaping status: Never Used   Substance Use Topics    Alcohol use: Yes     Alcohol/week: 0.0 standard drinks of alcohol     Comment: social    Drug use: No      E-Cigarette/Vaping    E-Cigarette Use Never User       E-Cigarette/Vaping Substances    Nicotine No     THC No     CBD No     Flavoring No     Other No     Unknown No       I have reviewed and agree with the history as documented.     54-year-old male, intermittent chest pain and palpitations, history of atrial fibrillation in the past, today at [employer] says he felt dizzy and lightheaded, almost felt like he felt like he was get a pass out, was not tachycardic at the time, all symptoms resolved currently completely asymptomatic currently going through changes of his Synthroid dosing status post thyroidectomy.,  No associated diaphoresis, no dyspnea on exertion, chest pain he says is sharp and central, no radiation to the back, typically last a few seconds to a few minutes, does not have it now did not have it earlier today with this episode of near syncope      History provided by:  Patient and spouse  Palpitations  Associated symptoms: chest pain, dizziness and shortness of breath    Associated symptoms: no cough, no diaphoresis, no nausea, no numbness and no vomiting        Review of Systems   Constitutional:  Negative for activity change, chills, diaphoresis and fever.   HENT:  Negative for congestion, sinus pressure and sore throat.    Eyes:  Negative for pain and visual disturbance.   Respiratory:  Positive for chest tightness and shortness of breath. Negative for cough, wheezing and stridor.    Cardiovascular:  Positive for chest pain and palpitations.   Gastrointestinal:  Negative for abdominal distention, abdominal pain, constipation, diarrhea, nausea and vomiting.   Genitourinary:  Negative for dysuria and frequency.    Musculoskeletal:  Negative for neck pain and neck stiffness.   Skin:  Negative for rash.   Neurological:  Positive for dizziness and light-headedness. Negative for speech difficulty, numbness and headaches.           Objective       ED Triage Vitals [12/29/24 1122]   Temperature Pulse Blood Pressure Respirations SpO2 Patient Position - Orthostatic VS   98.1 °F (36.7 °C) 69 (!) 181/93 20 97 % Sitting      Temp Source Heart Rate Source BP Location FiO2 (%) Pain Score    Oral Monitor Right arm -- --      Vitals      Date and Time Temp Pulse SpO2 Resp BP Pain Score FACES Pain Rating User   12/29/24 1122 98.1 °F (36.7 °C) 69 97 % 20 181/93 -- -- TP            Physical Exam  Vitals reviewed.   Constitutional:       General: He is not in acute distress.     Appearance: He is well-developed. He is not diaphoretic.   HENT:      Head: Normocephalic and atraumatic.      Right Ear: External ear normal.      Left Ear: External ear normal.      Nose: Nose normal.   Eyes:      General:         Right eye: No discharge.         Left eye: No discharge.      Pupils: Pupils are equal, round, and reactive to light.   Neck:      Trachea: No tracheal deviation.   Cardiovascular:      Rate and Rhythm: Normal rate and regular rhythm.      Heart sounds: Normal heart sounds. No murmur heard.  Pulmonary:      Effort: Pulmonary effort is normal. No respiratory distress.      Breath sounds: Normal breath sounds. No stridor.   Abdominal:      General: There is no distension.      Palpations: Abdomen is soft.      Tenderness: There is no abdominal tenderness. There is no guarding or rebound.   Musculoskeletal:         General: Normal range of motion.      Cervical back: Normal range of motion and neck supple.   Skin:     General: Skin is warm and dry.      Coloration: Skin is not pale.      Findings: No erythema.   Neurological:      General: No focal deficit present.      Mental Status: He is alert and oriented to person, place, and time.  "        Results Reviewed       Procedure Component Value Units Date/Time    T4, free [[id number]]  (Abnormal) Collected: 12/29/24 1233    Lab Status: Final result Specimen: Blood from Arm, Left Updated: 12/29/24 1651     Free T4 1.25 ng/dL     Narrative:        \"Therapeutic range for patients medicated with thyroid disorders: 0.61-1.24 ng/dL.\"    TSH, 3rd generation with Free T4 reflex [[id number]]  (Abnormal) Collected: 12/29/24 1233    Lab Status: Final result Specimen: Blood from Arm, Left Updated: 12/29/24 1323     TSH 3RD GENERATON 0.082 uIU/mL     HS Troponin 0hr (reflex protocol) [[id number]]  (Normal) Collected: 12/29/24 1233    Lab Status: Final result Specimen: Blood from Arm, Left Updated: 12/29/24 1315     hs TnI 0hr 8 ng/L     Comprehensive metabolic panel [[id number]] Collected: 12/29/24 1233    Lab Status: Final result Specimen: Blood from Arm, Left Updated: 12/29/24 1307     Sodium 139 mmol/L      Potassium 3.9 mmol/L      Chloride 106 mmol/L      CO2 26 mmol/L      ANION GAP 7 mmol/L      BUN 16 mg/dL      Creatinine 0.83 mg/dL      Glucose 95 mg/dL      Calcium 9.7 mg/dL      AST 28 U/L      ALT 34 U/L      Alkaline Phosphatase 35 U/L      Total Protein 6.6 g/dL      Albumin 4.6 g/dL      Total Bilirubin 0.62 mg/dL      eGFR 100 ml/min/1.73sq m     Narrative:      National Kidney Disease Foundation guidelines for Chronic Kidney Disease (CKD):     Stage 1 with normal or high GFR (GFR > 90 mL/min/1.73 square meters)    Stage 2 Mild CKD (GFR = 60-89 mL/min/1.73 square meters)    Stage 3A Moderate CKD (GFR = 45-59 mL/min/1.73 square meters)    Stage 3B Moderate CKD (GFR = 30-44 mL/min/1.73 square meters)    Stage 4 Severe CKD (GFR = 15-29 mL/min/1.73 square meters)    Stage 5 End Stage CKD (GFR <15 mL/min/1.73 square meters)  Note: GFR calculation is accurate only with a steady state creatinine    Protime-INR [[id number]]  (Normal) Collected: 12/29/24 1233    Lab Status: Final result Specimen: Blood from " Arm, Left Updated: 12/29/24 1306     Protime 14.0 seconds      INR 1.01    Narrative:      INR Therapeutic Range    Indication                                             INR Range      Atrial Fibrillation                                               2.0-3.0  Hypercoagulable State                                    2.0.2.3  Left Ventricular Asist Device                            2.0-3.0  Mechanical Heart Valve                                  -    Aortic(with afib, MI, embolism, HF, LA enlargement,    and/or coagulopathy)                                     2.0-3.0 (2.5-3.5)     Mitral                                                             2.5-3.5  Prosthetic/Bioprosthetic Heart Valve               2.0-3.0  Venous thromboembolism (VTE: VT, PE        2.0-3.0    APTT [[id number]]  (Normal) Collected: 12/29/24 1233    Lab Status: Final result Specimen: Blood from Arm, Left Updated: 12/29/24 1306     PTT 29 seconds     CBC and differential [[id number]] Collected: 12/29/24 1233    Lab Status: Final result Specimen: Blood from Arm, Left Updated: 12/29/24 1245     WBC 4.71 Thousand/uL      RBC 5.24 Million/uL      Hemoglobin 15.5 g/dL      Hematocrit 44.4 %      MCV 85 fL      MCH 29.6 pg      MCHC 34.9 g/dL      RDW 12.4 %      MPV 9.3 fL      Platelets 225 Thousands/uL      nRBC 0 /100 WBCs      Segmented % 54 %      Immature Grans % 0 %      Lymphocytes % 33 %      Monocytes % 8 %      Eosinophils Relative 4 %      Basophils Relative 1 %      Absolute Neutrophils 2.54 Thousands/µL      Absolute Immature Grans 0.02 Thousand/uL      Absolute Lymphocytes 1.54 Thousands/µL      Absolute Monocytes 0.39 Thousand/µL      Eosinophils Absolute 0.19 Thousand/µL      Basophils Absolute 0.03 Thousands/µL             XR chest 1 view portable   ED Interpretation by Elroy Morfin DO (12/29 1286)   No acute cardiopulmonary pathology          ECG 12 Lead Documentation Only    Date/Time: 12/29/2024 12:12 PM    Performed by:  Elroy Morfin DO  Authorized by: Elroy Morfin DO    ECG reviewed by me, the ED Provider: yes    Patient location:  ED  Previous ECG:     Previous ECG:  Compared to current    Comparison ECG info:  10.19.2024    Similarity:  No change  Interpretation:     Interpretation: non-specific    Rate:     ECG rate:  75  Rhythm:     Rhythm: sinus rhythm    Ectopy:     Ectopy: none    QRS:     QRS axis:  Normal    QRS intervals:  Normal  Conduction:     Conduction: normal    ST segments:     ST segments:  Non-specific  T waves:     T waves: non-specific        ED Medication and Procedure Management   Prior to Admission Medications   Prescriptions Last Dose Informant Patient Reported? Taking?   Ascorbic Acid (VITAMIN C) 1000 MG tablet  Self Yes No   Sig: Take 1,000 mg by mouth daily   Cholecalciferol (VITAMIN D) 2000 units CAPS  Self Yes No   Sig: Take 1 capsule by mouth daily   Magnesium 100 MG TABS  Self Yes No   Sig: Take by mouth daily   Multiple Vitamins-Minerals (ZINC PO)  Self Yes No   Sig: Take by mouth daily   Omega-3 Fatty Acids (FISH OIL) 645 MG CAPS  Self Yes No   Sig: Take by mouth daily   Potassium 99 MG TABS  Self Yes No   Sig: Take by mouth daily   Synthroid 150 MCG tablet  Self No No   Sig: Take 1 tablet (150 mcg total) by mouth daily 150 mcg 6x week and extra 75 mcg on Saturdays   Patient taking differently: Take 137 mcg by mouth daily 137 mg every day. 1/2 tablet extra on Saturdays   Zn-Pyg Afri-Nettle-Saw Palmet (SAW PALMETTO COMPLEX PO)  Self Yes No   Sig: Take by mouth   aspirin 81 mg chewable tablet   No No   Sig: Chew 1 tablet (81 mg total) daily   clomiPHENE (Clomid) 50 mg tablet  Self No No   Sig: TAKE 1/2 TABLET BY MOUTH EVERY OTHER DAY   metoprolol succinate (TOPROL-XL) 25 mg 24 hr tablet   No No   Sig: Take 1 tablet (25 mg total) by mouth daily   metroNIDAZOLE (METROCREAM) 0.75 % cream   No No   Sig: APPLY TO AFFECTED AREA TWICE A DAY      Facility-Administered Medications: None      Discharge Medication List as of 12/29/2024  1:47 PM        CONTINUE these medications which have NOT CHANGED    Details   Ascorbic Acid (VITAMIN C) 1000 MG tablet Take 1,000 mg by mouth daily, Historical Med      aspirin 81 mg chewable tablet Chew 1 tablet (81 mg total) daily, Starting Fri 11/15/2024, Normal      Cholecalciferol (VITAMIN D) 2000 units CAPS Take 1 capsule by mouth daily, Starting Tue 3/7/2017, Historical Med      clomiPHENE (Clomid) 50 mg tablet TAKE 1/2 TABLET BY MOUTH EVERY OTHER DAY, Normal      Magnesium 100 MG TABS Take by mouth daily, Historical Med      metoprolol succinate (TOPROL-XL) 25 mg 24 hr tablet Take 1 tablet (25 mg total) by mouth daily, Starting Fri 11/15/2024, Normal      metroNIDAZOLE (METROCREAM) 0.75 % cream APPLY TO AFFECTED AREA TWICE A DAY, Starting Fri 11/15/2024, Normal      Multiple Vitamins-Minerals (ZINC PO) Take by mouth daily, Historical Med      Omega-3 Fatty Acids (FISH OIL) 645 MG CAPS Take by mouth daily, Historical Med      Potassium 99 MG TABS Take by mouth daily, Historical Med      Synthroid 150 MCG tablet Take 1 tablet (150 mcg total) by mouth daily 150 mcg 6x week and extra 75 mcg on Saturdays, Starting Sun 10/20/2024, Normal      Zn-Pyg Afri-Nettle-Saw Palmet (SAW PALMETTO COMPLEX PO) Take by mouth, Historical Med           No discharge procedures on file.  ED SEPSIS DOCUMENTATION   Time reflects when diagnosis was documented in both MDM as applicable and the Disposition within this note       Time User Action Codes Description Comment    12/29/2024  1:47 PM Elroy Morfin Add [R00.2] Palpitations     12/29/2024  1:47 PM Elroy Morfin Add [R00.2] Heart palpitations     12/29/2024  1:47 PM Elroy Morfin Add [R07.9] Chest pain, unspecified type     12/29/2024  1:47 PM Elroy Morfin Add [R55] Near syncope     12/29/2024  1:47 PM Elroy Morfin Modify [R00.2] Palpitations     12/29/2024  1:47 PM Elroy Morfin Modify [R55] Near syncope                   Elroy Morfin, DO  12/29/24 3002

## 2024-12-31 ENCOUNTER — RESULTS FOLLOW-UP (OUTPATIENT)
Dept: EMERGENCY DEPT | Facility: HOSPITAL | Age: 54
End: 2024-12-31

## 2025-01-01 NOTE — RESULT ENCOUNTER NOTE
I spoke to the patient in regards to his thyroid results.  His TSH was underactive and his T4 was high at 1.25.  He was told by his oncologist that he should take a half a dose of his Synthroid which is normally 137 mcg/day yesterday.  Today he took a full dose.  He was also told to skip the extra half a dose on Saturdays by his oncologist he did not have the team for results when he spoke to the oncologist.  He is going to half his dose tomorrow and then reach out to his oncologist again as she manages all of his Synthroid medications.  I told him that this is most likely was causing the dizziness and the palpitations.  He states also at 1 time he went into atrial fibrillation when he was overactive.

## 2025-01-29 LAB
ATRIAL RATE: 75 BPM
P AXIS: 59 DEGREES
PR INTERVAL: 162 MS
QRS AXIS: -11 DEGREES
QRSD INTERVAL: 94 MS
QT INTERVAL: 388 MS
QTC INTERVAL: 433 MS
T WAVE AXIS: 15 DEGREES
VENTRICULAR RATE: 75 BPM

## 2025-02-05 ENCOUNTER — OFFICE VISIT (OUTPATIENT)
Dept: CARDIOLOGY CLINIC | Facility: CLINIC | Age: 55
End: 2025-02-05
Payer: COMMERCIAL

## 2025-02-05 VITALS
OXYGEN SATURATION: 97 % | DIASTOLIC BLOOD PRESSURE: 90 MMHG | SYSTOLIC BLOOD PRESSURE: 128 MMHG | BODY MASS INDEX: 29.44 KG/M2 | HEART RATE: 71 BPM | WEIGHT: 188 LBS

## 2025-02-05 DIAGNOSIS — E89.0 POSTOPERATIVE HYPOTHYROIDISM: ICD-10-CM

## 2025-02-05 DIAGNOSIS — R03.0 ELEVATED BLOOD PRESSURE READING: ICD-10-CM

## 2025-02-05 DIAGNOSIS — I48.91 ATRIAL FIBRILLATION WITH RAPID VENTRICULAR RESPONSE (HCC): Primary | ICD-10-CM

## 2025-02-05 DIAGNOSIS — Z78.9 ALCOHOL USE: ICD-10-CM

## 2025-02-05 PROCEDURE — 99214 OFFICE O/P EST MOD 30 MIN: CPT | Performed by: STUDENT IN AN ORGANIZED HEALTH CARE EDUCATION/TRAINING PROGRAM

## 2025-02-05 NOTE — PROGRESS NOTES
[facility]  [address]  [address]  [facility] [place] [address]  Phone#  [phone]  Fax#  [phone]  [facility] Follow-up Visit             NAME: Varun Lopez  AGE: 54 y.o. SEX: male   : 1970   MRN: [id number]    DATE: 2025  TIME: 11:06 AM      Assessment & Plan  Atrial fibrillation with rapid ventricular response (HCC)  Stable.   Likely triggered by abnormal thyroid hormone levels. Working with endocrine at [facility]  Continue metoprolol  Discussed potential rhythm control strategies if atrial fibrillation does not resolve after thyroid hormone correction.  Low CHADs-Vasc of zero. We discussed stroke risk. We discussed lack of evidence that aspirin is beneficial from Afib standpoint in the setting. He will stop it   He is also working on weight loss and decreasing other triggers (alcohol, caffeine, managing thyroid levels)  Postoperative hypothyroidism  Managed by Ankur Tobar   Continue levothyroxine, dosing per their instructions  Alcohol use  He is working on cutting back  We discussed that alcohol consumption can contribute to AFib  Elevated blood pressure reading  No dx HTN  Continue home monitoring     GELA  Stable.  Sleep study in 2017 negative  We discussed that this can contribute to afib      Not addressed today        History of thyroid cancer.  -Total thyroidectomy  -Managed by Ankur [facility]  -Most recent TSH 0.26 but with normal free T4.  Endocrinology consulted during admission       -----    Chief Complaint   Patient presents with    New Patient Visit       HPI:    Varun Lopez is a 54 y.o.-year-old male who presents to the cardiology clinic for follow up for the above-listed problems.     PMH: Was admitted with new recurrent atrial fibrillation with a ventricular rate of 153 bpm.  Was initially treated with IV metoprolol but with minimal response.  Was then loaded with IV digoxin and ultimately converted to sinus rhythm.  Was  found to have TSH of 0.26 with normal free T4.  This was addressed by inpatient endocrinology team.  Patient was counseled on excessive caffeine and alcohol use as it was felt these were likely triggers for his clinical symptoms and presentation.  He was discharged home on metoprolol succinate 25 mg daily and a baby aspirin given low [id number] score.  He is feeling well today with no cardiac complaints.  States he has had no breakthrough palpitations or rapid heartbeat.  He has cut back substantially on his bourbon and caffeine intake.  Some lifestyle changes as a relates to diet and exercise.  Working on reducing stress.     Continues to feel well without palpations. Afib burden per Apple watch <2%. No chest pain, discomfort, shortness of breath.    He did note that he received an FDA recall on levothyroxine for inaccurate dosing in some formulation. Sees endocrinology at [facility].      I personally reviewed the patient's pertinent cardiac imaging and labs in Epic:      10/20/24 echocardiogram.  LVEF 60%, normal diastolic function, mildly dilated LA, no significant valvular disease    10/19/2024 atrial fibrillation with rapid ventricular response and incomplete right bundle branch block    OP cardiac montior 8/2024: Sinus rhythm with one run of SVT; no atrial fibrillation.   -----    Past history, family history, social history, current medications, vital signs, recent lab and imaging studies and  prior cardiology studies reviewed independently on this visit.    No Known Allergies    Current Outpatient Medications:     Ascorbic Acid (VITAMIN C) 1000 MG tablet, Take 1,000 mg by mouth daily, Disp: , Rfl:     aspirin 81 mg chewable tablet, Chew 1 tablet (81 mg total) daily, Disp: 90 tablet, Rfl: 1    Magnesium 100 MG TABS, Take by mouth daily, Disp: , Rfl:     metoprolol succinate (TOPROL-XL) 25 mg 24 hr tablet, Take 1 tablet (25 mg total) by mouth daily, Disp: 90 tablet, Rfl: 1    metroNIDAZOLE (METROCREAM) 0.75 %  cream, APPLY TO AFFECTED AREA TWICE A DAY, Disp: 45 g, Rfl: 3    Multiple Vitamins-Minerals (ZINC PO), Take by mouth daily, Disp: , Rfl:     Omega-3 Fatty Acids (FISH OIL) 645 MG CAPS, Take by mouth daily, Disp: , Rfl:     Potassium 99 MG TABS, Take by mouth daily, Disp: , Rfl:     Synthroid 150 MCG tablet, Take 1 tablet (150 mcg total) by mouth daily 150 mcg 6x week and extra 75 mcg on Saturdays (Patient taking differently: Take 137 mcg by mouth daily 137 mg every day. 1/2 tablet extra on Saturdays), Disp: 30 tablet, Rfl: 0    Zn-Pyg Afri-Nettle-Saw Palmet (SAW PALMETTO COMPLEX PO), Take by mouth, Disp: , Rfl:     Cholecalciferol (VITAMIN D) 2000 units CAPS, Take 1 capsule by mouth daily, Disp: , Rfl:     clomiPHENE (Clomid) 50 mg tablet, TAKE 1/2 TABLET BY MOUTH EVERY OTHER DAY (Patient not taking: Reported on 2/5/2025), Disp: 23 tablet, Rfl: 2    Review of Systems   Constitutional:  Negative for fatigue.   Respiratory:  Negative for chest tightness and shortness of breath.    Cardiovascular:  Negative for chest pain and palpitations.         Objective:     Vitals:    02/05/25 1049   BP: 128/90   Pulse: 71   SpO2: 97%     Wt Readings from Last 3 Encounters:   02/05/25 85.3 kg (188 lb)   11/05/24 85.3 kg (188 lb)   10/20/24 83.9 kg (185 lb)     Pulse Readings from Last 3 Encounters:   02/05/25 71   12/29/24 69   11/05/24 80     BP Readings from Last 3 Encounters:   02/05/25 128/90   12/29/24 (!) 181/93   11/05/24 131/91     Physical Exam  Vitals reviewed.   Constitutional:       General: He is not in acute distress.     Appearance: Normal appearance. He is well-developed.   HENT:      Head: Normocephalic and atraumatic.   Cardiovascular:      Rate and Rhythm: Normal rate and regular rhythm.      Heart sounds: No murmur heard.  Pulmonary:      Effort: Pulmonary effort is normal. No respiratory distress.      Breath sounds: Normal breath sounds.   Abdominal:      General: There is no distension.   Musculoskeletal:     "     General: No swelling.   Skin:     General: Skin is warm and dry.   Neurological:      Mental Status: He is alert.   Psychiatric:         Mood and Affect: Mood normal.         Pertinent Laboratory/Diagnostic Studies:    Laboratory studies reviewed personally by Mariluz Tian MD    BMP:   Lab Results   Component Value Date    SODIUM 139 12/29/2024    K 3.9 12/29/2024     12/29/2024    CO2 26 12/29/2024    BUN 16 12/29/2024    CREATININE 0.83 12/29/2024    GLUC 95 12/29/2024    CALCIUM 9.7 12/29/2024     CBC:  Lab Results   Component Value Date    WBC 4.71 12/29/2024    HGB 15.5 12/29/2024    HCT 44.4 12/29/2024    MCV 85 12/29/2024     12/29/2024     Lipid Profile:   Lab Results   Component Value Date    HDL 47 03/15/2024     Lab Results   Component Value Date    LDLCALC 127 (H) 03/15/2024     Lab Results   Component Value Date    TRIG 158 (H) 03/15/2024      Other labs:  Lab Results   Component Value Date    [id number] 0.082 (L) 12/29/2024     Lab Results   Component Value Date    ALT 34 12/29/2024    AST 28 12/29/2024     Lab Results   Component Value Date    HGBA1C 4.8 02/20/2018         Imaging Studies:     Pertinent imaging studies and cardiac studies were independently reviewed on this visit and findings summarized.    Mariluz Tian MD, PhD    Portions of the record may have been created with voice recognition software.  Occasional wrong word or \"sound alike\" substitutions may have occurred due to the inherent limitations of voice recognition software.  Read the chart carefully and recognize, using context, where substitutions have occurred. Please reach out to me directly for any clarifications.   "

## 2025-02-21 ENCOUNTER — RESULTS FOLLOW-UP (OUTPATIENT)
Dept: FAMILY MEDICINE CLINIC | Facility: CLINIC | Age: 55
End: 2025-02-21

## 2025-02-21 LAB
25(OH)D3 SERPL-MCNC: 52 NG/ML (ref 30–100)
BASOPHILS # BLD AUTO: 48 CELLS/UL (ref 0–200)
BASOPHILS NFR BLD AUTO: 1.1 %
CHOLEST SERPL-MCNC: 210 MG/DL
CHOLEST/HDLC SERPL: 4.6 (CALC)
EOSINOPHIL # BLD AUTO: 242 CELLS/UL (ref 15–500)
EOSINOPHIL NFR BLD AUTO: 5.5 %
ERYTHROCYTE [DISTWIDTH] IN BLOOD BY AUTOMATED COUNT: 12.9 % (ref 11–15)
HCT VFR BLD AUTO: 44.5 % (ref 38.5–50)
HDLC SERPL-MCNC: 46 MG/DL
HGB BLD-MCNC: 15.1 G/DL (ref 13.2–17.1)
LDLC SERPL CALC-MCNC: 138 MG/DL (CALC)
LYMPHOCYTES # BLD AUTO: 1575 CELLS/UL (ref 850–3900)
LYMPHOCYTES NFR BLD AUTO: 35.8 %
MCH RBC QN AUTO: 29.1 PG (ref 27–33)
MCHC RBC AUTO-ENTMCNC: 33.9 G/DL (ref 32–36)
MCV RBC AUTO: 85.7 FL (ref 80–100)
MONOCYTES # BLD AUTO: 299 CELLS/UL (ref 200–950)
MONOCYTES NFR BLD AUTO: 6.8 %
NEUTROPHILS # BLD AUTO: 2235 CELLS/UL (ref 1500–7800)
NEUTROPHILS NFR BLD AUTO: 50.8 %
NONHDLC SERPL-MCNC: 164 MG/DL (CALC)
PLATELET # BLD AUTO: 274 THOUSAND/UL (ref 140–400)
PMV BLD REES-ECKER: 11.4 FL (ref 7.5–12.5)
PSA SERPL-MCNC: 0.55 NG/ML
RBC # BLD AUTO: 5.19 MILLION/UL (ref 4.2–5.8)
TRIGL SERPL-MCNC: 140 MG/DL
WBC # BLD AUTO: 4.4 THOUSAND/UL (ref 3.8–10.8)

## 2025-02-25 LAB
ALBUMIN SERPL-MCNC: 4.4 G/DL (ref 3.6–5.1)
ALBUMIN/GLOB SERPL: 2.1 (CALC) (ref 1–2.5)
ALP SERPL-CCNC: 42 U/L (ref 35–144)
ALT SERPL-CCNC: 29 U/L (ref 9–46)
AST SERPL-CCNC: 19 U/L (ref 10–35)
BILIRUB SERPL-MCNC: 0.5 MG/DL (ref 0.2–1.2)
BUN SERPL-MCNC: 21 MG/DL (ref 7–25)
BUN/CREAT SERPL: NORMAL (CALC) (ref 6–22)
CALCIUM SERPL-MCNC: 9.4 MG/DL (ref 8.6–10.3)
CHLORIDE SERPL-SCNC: 106 MMOL/L (ref 98–110)
CO2 SERPL-SCNC: 27 MMOL/L (ref 20–32)
CREAT SERPL-MCNC: 0.82 MG/DL (ref 0.7–1.3)
GFR/BSA.PRED SERPLBLD CYS-BASED-ARV: 105 ML/MIN/1.73M2
GLOBULIN SER CALC-MCNC: 2.1 G/DL (CALC) (ref 1.9–3.7)
GLUCOSE SERPL-MCNC: 94 MG/DL (ref 65–99)
POTASSIUM SERPL-SCNC: 4.6 MMOL/L (ref 3.5–5.3)
PROT SERPL-MCNC: 6.5 G/DL (ref 6.1–8.1)
SODIUM SERPL-SCNC: 141 MMOL/L (ref 135–146)
TESTOST FREE SERPL-MCNC: 51.5 PG/ML (ref 35–155)
TESTOST SERPL-MCNC: 343 NG/DL (ref 250–1100)
TSH SERPL-ACNC: 0.47 MIU/L (ref 0.4–4.5)

## 2025-02-26 ENCOUNTER — RESULTS FOLLOW-UP (OUTPATIENT)
Dept: FAMILY MEDICINE CLINIC | Facility: CLINIC | Age: 55
End: 2025-02-26

## 2025-03-31 ENCOUNTER — OFFICE VISIT (OUTPATIENT)
Dept: FAMILY MEDICINE CLINIC | Facility: CLINIC | Age: 55
End: 2025-03-31
Payer: COMMERCIAL

## 2025-03-31 VITALS
SYSTOLIC BLOOD PRESSURE: 122 MMHG | DIASTOLIC BLOOD PRESSURE: 78 MMHG | HEART RATE: 64 BPM | RESPIRATION RATE: 16 BRPM | OXYGEN SATURATION: 97 % | HEIGHT: 67 IN | WEIGHT: 186 LBS | BODY MASS INDEX: 29.19 KG/M2

## 2025-03-31 DIAGNOSIS — E78.00 HYPERCHOLESTEROLEMIA: ICD-10-CM

## 2025-03-31 DIAGNOSIS — Z00.00 PHYSICAL EXAM, ANNUAL: ICD-10-CM

## 2025-03-31 DIAGNOSIS — E23.0 HYPOGONADOTROPIC HYPOGONADISM (HCC): ICD-10-CM

## 2025-03-31 DIAGNOSIS — Z00.00 ANNUAL PHYSICAL EXAM: Primary | ICD-10-CM

## 2025-03-31 DIAGNOSIS — I48.91 NEW ONSET ATRIAL FIBRILLATION (HCC): ICD-10-CM

## 2025-03-31 DIAGNOSIS — E89.0 POSTOPERATIVE HYPOTHYROIDISM: ICD-10-CM

## 2025-03-31 DIAGNOSIS — Z12.11 COLON CANCER SCREENING: ICD-10-CM

## 2025-03-31 DIAGNOSIS — I48.91 NEW ONSET ATRIAL FIBRILLATION (HCC): Primary | ICD-10-CM

## 2025-03-31 PROCEDURE — 99213 OFFICE O/P EST LOW 20 MIN: CPT | Performed by: FAMILY MEDICINE

## 2025-03-31 PROCEDURE — 99396 PREV VISIT EST AGE 40-64: CPT | Performed by: FAMILY MEDICINE

## 2025-03-31 RX ORDER — LEVOTHYROXINE SODIUM 137 MCG
TABLET ORAL
COMMUNITY
Start: 2025-02-11

## 2025-03-31 RX ORDER — LEVOTHYROXINE SODIUM 137 UG/1
CAPSULE ORAL
COMMUNITY
Start: 2024-11-01

## 2025-03-31 NOTE — ASSESSMENT & PLAN NOTE
Presently in normal sinus rhythm.  Patient remains on Toprol-XL 25 mg once daily.  He would like to eventually get off of Toprol-XL as he is experiencing some lightheadedness with it    Etiology?  He has followed up with cardiology.  Was found to be mildly hyperthyroid and dose of Synthroid was adjusted.  He is now simply taking branded Synthroid

## 2025-03-31 NOTE — PATIENT INSTRUCTIONS
"Patient Education     Routine physical for adults   The Basics   Written by the doctors and editors at [facility]   What is a physical? -- A physical is a routine visit, or \"check-up,\" with your doctor. You might also hear it called a \"wellness visit\" or \"preventive visit.\"  During each visit, the doctor will:   Ask about your physical and mental health   Ask about your habits, behaviors, and lifestyle   Do an exam   Give you vaccines if needed   Talk to you about any medicines you take   Give advice about your health   Answer your questions  Getting regular check-ups is an important part of taking care of your health. It can help your doctor find and treat any problems you have. But it's also important for preventing health problems.  A routine physical is different from a \"sick visit.\" A sick visit is when you see a doctor because of a health concern or problem. Since physicals are scheduled ahead of time, you can think about what you want to ask the doctor.  How often should I get a physical? -- It depends on your age and health. In general, for people age 21 years and older:   If you are younger than 50 years, you might be able to get a physical every 3 years.   If you are 50 years or older, your doctor might recommend a physical every year.  If you have an ongoing health condition, like diabetes or high blood pressure, your doctor will probably want to see you more often.  What happens during a physical? -- In general, each visit will include:   Physical exam - The doctor or nurse will check your height, weight, heart rate, and blood pressure. They will also look at your eyes and ears. They will ask about how you are feeling and whether you have any symptoms that bother you.   Medicines - It's a good idea to bring a list of all the medicines you take to each doctor visit. Your doctor will talk to you about your medicines and answer any questions. Tell them if you are having any side effects that bother you. You " "should also tell them if you are having trouble paying for any of your medicines.   Habits and behaviors - This includes:   Your diet   Your exercise habits   Whether you smoke, drink alcohol, or use drugs   Whether you are sexually active   Whether you feel safe at home  Your doctor will talk to you about things you can do to improve your health and lower your risk of health problems. They will also offer help and support. For example, if you want to quit smoking, they can give you advice and might prescribe medicines. If you want to improve your diet or get more physical activity, they can help you with this, too.   Lab tests, if needed - The tests you get will depend on your age and situation. For example, your doctor might want to check your:   Cholesterol   Blood sugar   Iron level   Vaccines - The recommended vaccines will depend on your age, health, and what vaccines you already had. Vaccines are very important because they can prevent certain serious or deadly infections.   Discussion of screening - \"Screening\" means checking for diseases or other health problems before they cause symptoms. Your doctor can recommend screening based on your age, risk, and preferences. This might include tests to check for:   Cancer, such as breast, prostate, cervical, ovarian, colorectal, prostate, lung, or skin cancer   Sexually transmitted infections, such as chlamydia and gonorrhea   Mental health conditions like depression and anxiety  Your doctor will talk to you about the different types of screening tests. They can help you decide which screenings to have. They can also explain what the results might mean.   Answering questions - The physical is a good time to ask the doctor or nurse questions about your health. If needed, they can refer you to other doctors or specialists, too.  Adults older than 65 years often need other care, too. As you get older, your doctor will talk to you about:   How to prevent falling at " home   Hearing or vision tests   Memory testing   How to take your medicines safely   Making sure that you have the help and support you need at home  All topics are updated as new evidence becomes available and our peer review process is complete.  This topic retrieved from [employer] on: May 02, 2024.  Topic 878215 Version 1.0  Release: 32.4.3 - C32.122  © 2024 UpToDate, Inc. and/or its affiliates. All rights reserved.  Consumer Information Use and Disclaimer   Disclaimer: This generalized information is a limited summary of diagnosis, treatment, and/or medication information. It is not meant to be comprehensive and should be used as a tool to help the user understand and/or assess potential diagnostic and treatment options. It does NOT include all information about conditions, treatments, medications, side effects, or risks that may apply to a specific patient. It is not intended to be medical advice or a substitute for the medical advice, diagnosis, or treatment of a health care provider based on the health care provider's examination and assessment of a patient's specific and unique circumstances. Patients must speak with a health care provider for complete information about their health, medical questions, and treatment options, including any risks or benefits regarding use of medications. This information does not endorse any treatments or medications as safe, effective, or approved for treating a specific patient. UpToDate, Inc. and its affiliates disclaim any warranty or liability relating to this information or the use thereof.The use of this information is governed by the Terms of Use, available at https://www.woltersInRiveruwer.com/en/know/clinical-effectiveness-terms. 2024© UpToDate, Inc. and its affiliates and/or licensors. All rights reserved.  Copyright   © 2024 UpToDate, Inc. and/or its affiliates. All rights reserved.

## 2025-03-31 NOTE — ASSESSMENT & PLAN NOTE
Cholesterol has increased slightly.  Patient admits that he really was not doing very much over the winter.  Short-term repeat lipid panel ordered  Orders:  •  Lipid panel; Future

## 2025-03-31 NOTE — PROGRESS NOTES
Adult Annual Physical  Name: Varun Lopez      : 1970      MRN: [id number]  Encounter Provider: Jose Mccarthy DO  Encounter Date: 3/31/2025   Encounter department: [facility]    Assessment & Plan  Annual physical exam  Annual physical examination performed.  Labs reviewed.       New onset atrial fibrillation (HCC)  Presently in normal sinus rhythm.  Patient remains on Toprol-XL 25 mg once daily.  He would like to eventually get off of Toprol-XL as he is experiencing some lightheadedness with it    Etiology?  He has followed up with cardiology.  Was found to be mildly hyperthyroid and dose of Synthroid was adjusted.  He is now simply taking branded Synthroid       Physical exam, annual  Annual physical examination performed           Hypercholesterolemia  Cholesterol has increased slightly.  Patient admits that he really was not doing very much over the winter.  Short-term repeat lipid panel ordered  Orders:  •  Lipid panel; Future    Postoperative hypothyroidism  Managed by endocrinologist at [place]       Hypogonadotropic hypogonadism (HCC)  Managed by endocrinologist at [place]       Colon cancer screening  Patient did perform Cologuard once back in .  Overdue for repeat.  Patient would like to perform colonoscopy before he retires from work probably next year  Orders:  •  Ambulatory Referral to Gastroenterology; Future    Preventive Screenings:  - Diabetes Screening: screening up-to-date  - Cholesterol Screening: screening not indicated and has hyperlipidemia   - Hepatitis C screening: screening up-to-date   - HIV screening: risks/benefits discussed   - Colon cancer screening: orders placed   - Lung cancer screening: screening not indicated   - Prostate cancer screening: screening up-to-date     Immunizations:  - Immunizations due: Influenza and Tdap    Counseling/Anticipatory Guidance:  - Alcohol: discussed moderation in alcohol intake and recommendations for healthy  alcohol use.   - Injury prevention: discussed safety/seat belts, safety helmets, smoke detectors, carbon monoxide detectors, and smoking near bedding or upholstery.       Depression Screening and Follow-up Plan: Patient was screened for depression during today's encounter. They screened negative with a PHQ-2 score of 0.          History of Present Illness     Adult Annual Physical:  Patient presents for annual physical. 55-year-old male with history of papillary carcinoma of the thyroid presents for complete physical examination and follow-up of chronic conditions.  Thyroid hormone supplementation is managed by thyroid cancer specialist at [facility].  There was adjustment made to his Synthroid dosing.  He was on levothyroxine and apparently the generic levothyroxine was inappropriately being dosed based upon generic manufacture.  Patient had received an FDA recall letter.  He did develop episodes of atrial fibrillation which was felt to be the result of overcorrection of his thyroid hormone replacement as his TSH was suppressed and his free T4 was elevated.  Patient has been wearing an Apple Watch and has been using a [employer] mobile EKG device when he does experience episodes of palpitations.  This past Saturday he was doing quite a bit of yard work outside when it was extremely hot and he likely did not drink anything and he felt very lightheaded which did subside after he went inside drank and ate.  He would like to get off of Toprol-XL.  He did have follow-up with cardiologist in February.  Patient is overdue for colon cancer screening.  Endocrinologist at [place] did take him off of Clomid.  Since the patient has been back to exercising and running 3 to 4 days/week he has not experienced episodes of palpitations.     Diet and Physical Activity:  - Diet/Nutrition: low carb diet and intermittent fasting.  - Exercise: vigorous cardiovascular exercise and 3-4 times a week on average.    Depression  Screening:  - PHQ-2 Score: 0    General Health:  - Sleep: 7-8 hours of sleep on average.  - Hearing: normal hearing right ear and normal hearing left ear.  - Vision: most recent eye exam < 1 year ago.  - Dental: regular dental visits and brushes teeth twice daily.    /GYN Health:    - History of STDs: no     Health:  - History of STDs: no.     Advanced Care Planning:  - Has an advanced directive?: yes    - Has a durable medical POA?: yes      Review of Systems   Constitutional: Negative.    HENT: Negative.     Eyes: Negative.    Respiratory: Negative.     Cardiovascular: Negative.  Negative for palpitations.   Gastrointestinal: Negative.    Endocrine: Negative.    Genitourinary: Negative.    Musculoskeletal: Negative.    Skin: Negative.    Allergic/Immunologic: Negative.    Neurological: Negative.    Hematological: Negative.    Psychiatric/Behavioral: Negative.     All other systems reviewed and are negative.          Medical History Reviewed by provider this encounter:  Tobacco  Allergies  Meds  Problems  Med Hx  Surg Hx  Fam Hx     .  Past Medical History   Past Medical History:   Diagnosis Date   • Papillary thyroid carcinoma (HCC) 02/23/2018    had total thyroidectomy   • Recent URI     pt states is better - cough in am then subsides   • Rosacea    • Seasonal allergies    • Varicose veins of right lower extremity      Past Surgical History:   Procedure Laterality Date   • CYST REMOVAL      on back   • HERNIA REPAIR Left     inguinal with mesh   • KNEE ARTHROSCOPY Right    • CO STAB PHLEBT VARICOSE VEINS 1 XTR > 20 INCS Right 11/13/2023    Procedure: MULTIPLE STAB PHLEB;  Surgeon: Teddy Roberts DO;  Location: [facility];  Service: Vascular   • SHOULDER ARTHROSCOPY Bilateral    • TOTAL THYROIDECTOMY     • WISDOM TOOTH EXTRACTION       Family History   Problem Relation Age of Onset   • Prostate cancer Father         Prostate removed   • Hypertension Father         on meds   • Diabetes Maternal  Grandfather    • Prostate cancer Paternal Grandfather       reports that he quit smoking about 23 years ago. His smoking use included cigarettes. He quit smokeless tobacco use about 23 years ago.  His smokeless tobacco use included chew. He reports current alcohol use. He reports that he does not use drugs.  Current Outpatient Medications   Medication Instructions   • Levothyroxine Sodium 137 MCG CAPS    • Magnesium 100 MG TABS Daily   • metoprolol succinate (TOPROL-XL) 25 mg, Oral, Daily   • metroNIDAZOLE (METROCREAM) 0.75 % cream 1 Application, Topical, 2 times daily, To affected area   • Multiple Vitamins-Minerals (ZINC PO) Daily   • Omega-3 Fatty Acids (FISH OIL) 645 MG CAPS Daily   • Potassium 99 MG TABS Daily   • Synthroid 137 MCG tablet TAKE 1 TABLET 6 DAYS A WEEKAND 1 AND 1/2 TABLETS ONCE A WEEK   • vitamin C 1,000 mg, Daily   • Zn-Pyg Afri-Nettle-Saw Palmet (SAW PALMETTO COMPLEX PO) Take by mouth   No Known Allergies   Current Outpatient Medications on File Prior to Visit   Medication Sig Dispense Refill   • Ascorbic Acid (VITAMIN C) 1000 MG tablet Take 1,000 mg by mouth daily     • Levothyroxine Sodium 137 MCG CAPS      • Magnesium 100 MG TABS Take by mouth daily     • metoprolol succinate (TOPROL-XL) 25 mg 24 hr tablet Take 1 tablet (25 mg total) by mouth daily 90 tablet 1   • metroNIDAZOLE (METROCREAM) 0.75 % cream APPLY TO AFFECTED AREA TWICE A DAY 45 g 3   • Multiple Vitamins-Minerals (ZINC PO) Take by mouth daily     • Omega-3 Fatty Acids (FISH OIL) 645 MG CAPS Take by mouth daily     • Potassium 99 MG TABS Take by mouth daily     • Synthroid 137 MCG tablet TAKE 1 TABLET 6 DAYS A WEEKAND 1 AND 1/2 TABLETS ONCE A WEEK     • Zn-Pyg Afri-Nettle-Saw Palmet (SAW PALMETTO COMPLEX PO) Take by mouth     • [DISCONTINUED] Cholecalciferol (VITAMIN D) 2000 units CAPS Take 1 capsule by mouth daily     • [DISCONTINUED] clomiPHENE (Clomid) 50 mg tablet TAKE 1/2 TABLET BY MOUTH EVERY OTHER DAY (Patient not taking:  "Reported on 2025) 23 tablet 2   • [DISCONTINUED] Synthroid 150 MCG tablet Take 1 tablet (150 mcg total) by mouth daily 150 mcg 6x week and extra 75 mcg on  (Patient taking differently: Take 137 mcg by mouth daily 137 mg every day. 1/2 tablet extra on ) 30 tablet 0     No current facility-administered medications on file prior to visit.      Social History     Tobacco Use   • Smoking status: Former     Current packs/day: 0.00     Types: Cigarettes     Quit date:      Years since quittin.2   • Smokeless tobacco: Former     Types: Chew     Quit date: 2002   Vaping Use   • Vaping status: Never Used   Substance and Sexual Activity   • Alcohol use: Yes     Alcohol/week: 0.0 standard drinks of alcohol     Comment: social   • Drug use: No   • Sexual activity: Yes     Partners: Female     Comment: , vasectomy       Objective   /78   Pulse 64   Resp 16   Ht 5' 7\" (1.702 m)   Wt 84.4 kg (186 lb)   SpO2 97%   BMI 29.13 kg/m²     Physical Exam  Vitals and nursing note reviewed.   Constitutional:       Appearance: Normal appearance. He is well-developed and normal weight.   HENT:      Head: Normocephalic and atraumatic.      Right Ear: Tympanic membrane, ear canal and external ear normal.      Left Ear: Tympanic membrane, ear canal and external ear normal.      Nose: Nose normal.      Mouth/Throat:      Mouth: Mucous membranes are moist.      Pharynx: Oropharynx is clear.   Eyes:      General: No scleral icterus.        Right eye: No discharge.         Left eye: No discharge.      Extraocular Movements: Extraocular movements intact.      Conjunctiva/sclera: Conjunctivae normal.      Pupils: Pupils are equal, round, and reactive to light.   Cardiovascular:      Rate and Rhythm: Normal rate and regular rhythm.      Pulses: Normal pulses.      Heart sounds: Normal heart sounds.   Pulmonary:      Effort: Pulmonary effort is normal.      Breath sounds: Normal breath sounds. "   Abdominal:      General: Bowel sounds are normal.      Palpations: Abdomen is soft.   Genitourinary:     Penis: Normal.       Prostate: Normal.      Rectum: Normal.   Musculoskeletal:         General: Normal range of motion.      Cervical back: Normal range of motion and neck supple.   Skin:     General: Skin is warm and dry.   Neurological:      General: No focal deficit present.      Mental Status: He is alert and oriented to person, place, and time. Mental status is at baseline.   Psychiatric:         Mood and Affect: Mood normal.         Behavior: Behavior normal.         Thought Content: Thought content normal.         Judgment: Judgment normal.         Recent Results (from the past 8 weeks)   Lipid panel    Collection Time: 02/20/25  7:00 AM   Result Value Ref Range    Total Cholesterol 210 (H) <200 mg/dL    HDL 46 > OR = 40 mg/dL    Triglycerides 140 <150 mg/dL    LDL Calculated 138 (H) mg/dL (calc)    Chol HDLC Ratio 4.6 <5.0 (calc)    Non-HDL Cholesterol 164 (H) <130 mg/dL (calc)   CBC and differential    Collection Time: 02/20/25  7:00 AM   Result Value Ref Range    White Blood Cell Count 4.4 3.8 - 10.8 Thousand/uL    Red Blood Cell Count 5.19 4.20 - 5.80 Million/uL    Hemoglobin 15.1 13.2 - 17.1 g/dL    HCT 44.5 38.5 - 50.0 %    MCV 85.7 80.0 - 100.0 fL    MCH 29.1 27.0 - 33.0 pg    MCHC 33.9 32.0 - 36.0 g/dL    RDW 12.9 11.0 - 15.0 %    Platelet Count 274 140 - 400 Thousand/uL    SL AMB MPV 11.4 7.5 - 12.5 fL    Neutrophils (Absolute) 2,235 1,500 - 7,800 cells/uL    Lymphocytes (Absolute) 1,575 850 - 3,900 cells/uL    Monocytes (Absolute) 299 200 - 950 cells/uL    Eosinophils (Absolute) 242 15 - 500 cells/uL    Basophils ABS 48 0 - 200 cells/uL    Neutrophils 50.8 %    Lymphocytes 35.8 %    Monocytes 6.8 %    Eosinophils 5.5 %    Basophils PCT 1.1 %   PSA, Total Screen    Collection Time: 02/20/25  7:00 AM   Result Value Ref Range    Prostate Specific Antigen Total 0.55 < OR = 4.00 ng/mL   Vitamin D 25  hydroxy    Collection Time: 02/20/25  7:00 AM   Result Value Ref Range    Vitamin D, 25-Hydroxy, Serum 52 30 - 100 ng/mL   Comprehensive metabolic panel    Collection Time: 02/20/25  7:03 AM   Result Value Ref Range    Glucose, Random 94 65 - 99 mg/dL    BUN 21 7 - 25 mg/dL    Creatinine 0.82 0.70 - 1.30 mg/dL    eGFR 105 > OR = 60 mL/min/1.73m2    SL AMB BUN/CREATININE RATIO SEE NOTE: 6 - 22 (calc)    Sodium 141 135 - 146 mmol/L    Potassium 4.6 3.5 - 5.3 mmol/L    Chloride 106 98 - 110 mmol/L    CO2 27 20 - 32 mmol/L    Calcium 9.4 8.6 - 10.3 mg/dL    Protein, Total 6.5 6.1 - 8.1 g/dL    Albumin 4.4 3.6 - 5.1 g/dL    Globulin 2.1 1.9 - 3.7 g/dL (calc)    Albumin/Globulin Ratio 2.1 1.0 - 2.5 (calc)    TOTAL BILIRUBIN 0.5 0.2 - 1.2 mg/dL    Alkaline Phosphatase 42 35 - 144 U/L    AST 19 10 - 35 U/L    ALT 29 9 - 46 U/L   TSH, 3rd generation with Free T4 reflex    Collection Time: 02/20/25  7:03 AM   Result Value Ref Range    TSH W/RFX TO FREE T4 0.47 0.40 - 4.50 mIU/L   Testosterone, free, total    Collection Time: 02/20/25  7:03 AM   Result Value Ref Range    Testosterone, Total, LC/ 250 - 1,100 ng/dL    Testosterone, Free 51.5 35.0 - 155.0 pg/mL

## 2025-04-04 ENCOUNTER — HOSPITAL ENCOUNTER (OUTPATIENT)
Dept: CT IMAGING | Facility: HOSPITAL | Age: 55
Discharge: HOME | End: 2025-04-04
Payer: COMMERCIAL

## 2025-04-04 DIAGNOSIS — C73 MALIGNANT NEOPLASM OF THYROID GLAND (HCC): ICD-10-CM

## 2025-04-04 PROCEDURE — 70491 CT SOFT TISSUE NECK W/DYE: CPT

## 2025-04-04 RX ADMIN — IOHEXOL 85 ML: 350 INJECTION, SOLUTION INTRAVENOUS at 07:50

## 2025-05-10 DIAGNOSIS — I48.91 ATRIAL FIBRILLATION WITH RAPID VENTRICULAR RESPONSE (HCC): ICD-10-CM

## 2025-05-10 DIAGNOSIS — I48.91 NEW ONSET ATRIAL FIBRILLATION (HCC): ICD-10-CM

## 2025-05-11 RX ORDER — METOPROLOL SUCCINATE 25 MG/1
25 TABLET, EXTENDED RELEASE ORAL DAILY
Qty: 90 TABLET | Refills: 1 | Status: SHIPPED | OUTPATIENT
Start: 2025-05-11

## 2025-05-15 ENCOUNTER — TELEPHONE (OUTPATIENT)
Age: 55
End: 2025-05-15

## 2025-05-15 NOTE — TELEPHONE ENCOUNTER
05/15/25  Screened by: Samantha Ortega    Referring Provider Dr. Mccarthy    Pre- Screening: Yes    There is no height or weight on file to calculate BMI.  Has patient been referred for a routine screening Colonoscopy? yes  Is the patient between 45-75 years old? yes      Previous Colonoscopy no   If yes:    Date:     Facility:     Reason:     Does the patient want to see a Gastroenterologist prior to their procedure OR are they having any GI symptoms? no    Has the patient been hospitalized or had abdominal surgery in the past 6 months? no    Does the patient use supplemental oxygen? no    Does the patient take Coumadin, Lovenox, Plavix, Elliquis, Xarelto, or other blood thinning medication? no    Has the patient had a stroke, cardiac event, or stent placed in the past year? yes

## 2025-05-30 LAB
CHOLEST SERPL-MCNC: 222 MG/DL
CHOLEST/HDLC SERPL: 4 (CALC)
HDLC SERPL-MCNC: 56 MG/DL
LDLC SERPL CALC-MCNC: 148 MG/DL (CALC)
NONHDLC SERPL-MCNC: 166 MG/DL (CALC)
TRIGL SERPL-MCNC: 78 MG/DL

## 2025-06-02 DIAGNOSIS — E89.0 POSTOPERATIVE HYPOTHYROIDISM: ICD-10-CM

## 2025-06-02 DIAGNOSIS — I48.91 NEW ONSET ATRIAL FIBRILLATION (HCC): Primary | ICD-10-CM

## 2025-06-04 ENCOUNTER — RESULTS FOLLOW-UP (OUTPATIENT)
Dept: FAMILY MEDICINE CLINIC | Facility: CLINIC | Age: 55
End: 2025-06-04

## 2025-06-04 DIAGNOSIS — E78.00 HYPERCHOLESTEROLEMIA: Primary | ICD-10-CM

## 2025-06-04 RX ORDER — ROSUVASTATIN CALCIUM 5 MG/1
5 TABLET, COATED ORAL DAILY
Qty: 100 TABLET | Refills: 3 | Status: SHIPPED | OUTPATIENT
Start: 2025-06-04

## 2025-06-20 ENCOUNTER — OFFICE VISIT (OUTPATIENT)
Dept: GASTROENTEROLOGY | Facility: CLINIC | Age: 55
End: 2025-06-20
Attending: FAMILY MEDICINE
Payer: COMMERCIAL

## 2025-06-20 ENCOUNTER — TELEPHONE (OUTPATIENT)
Age: 55
End: 2025-06-20

## 2025-06-20 ENCOUNTER — TELEPHONE (OUTPATIENT)
Dept: GASTROENTEROLOGY | Facility: CLINIC | Age: 55
End: 2025-06-20

## 2025-06-20 VITALS
WEIGHT: 182 LBS | HEART RATE: 74 BPM | HEIGHT: 67 IN | DIASTOLIC BLOOD PRESSURE: 101 MMHG | BODY MASS INDEX: 28.56 KG/M2 | SYSTOLIC BLOOD PRESSURE: 153 MMHG

## 2025-06-20 DIAGNOSIS — Z12.11 COLON CANCER SCREENING: ICD-10-CM

## 2025-06-20 PROCEDURE — 99203 OFFICE O/P NEW LOW 30 MIN: CPT | Performed by: PHYSICIAN ASSISTANT

## 2025-06-20 RX ORDER — SODIUM PICOSULFATE, MAGNESIUM OXIDE, AND ANHYDROUS CITRIC ACID 12; 3.5; 1 G/175ML; G/175ML; MG/175ML
LIQUID ORAL
Qty: 350 ML | Refills: 0 | Status: SHIPPED | OUTPATIENT
Start: 2025-06-20

## 2025-06-20 RX ORDER — SODIUM CHLORIDE, SODIUM LACTATE, POTASSIUM CHLORIDE, CALCIUM CHLORIDE 600; 310; 30; 20 MG/100ML; MG/100ML; MG/100ML; MG/100ML
125 INJECTION, SOLUTION INTRAVENOUS CONTINUOUS
OUTPATIENT
Start: 2025-06-20

## 2025-06-20 NOTE — TELEPHONE ENCOUNTER
Scheduled date of colonoscopy (as of today):  8/4/25  Physician performing colonoscopy: Dr Jones  Location of colonoscopy: [facility]  Bowel prep reviewed with patient: Clenpiq given at appt   Instructions reviewed with patient by: ls   Clearances: due to Afib, will send cardiac clearance request to Dr Tian

## 2025-06-20 NOTE — TELEPHONE ENCOUNTER
Dr Tian~    Our mutual patient is scheduled for procedure: colonoscopy at [facility]. Please advise if pt is cardiac cleared due to Afib less than 1 year ago. Thank you so much!     On: __8__/_   4 _/_  25  _      With:  ___Karen______    He/She is taking the following blood thinner:    N/A      Can this be stopped __N/A____ days prior to the procedure?      Physician Approving clearance: ________________________

## 2025-06-20 NOTE — LETTER
Cardiology Pre Operative Risk Assessment      PRE OPERATIVE CARDIAC RISK ASSESSMENT    06/20/25    Varun Lopez  1970  [id number]    Date of Surgery: 08/04/2025    Type of Surgery: Colonoscopy    Surgeon: Dr. Jones    No Cardiac Contraindication for Planned Surgical Procedures    Anticoagulation: N/A    Electronically Signed:   Mariluz Tian MD, PhD  Cardiology

## 2025-06-20 NOTE — PROGRESS NOTES
"Name: Varun Lopez      : 1970      MRN: [id number]  Encounter Provider: Marcia Garcia PA-C  Encounter Date: 2025   Encounter department: [facility] [place] [address]  :  Assessment & Plan  Colon cancer screening  Plan for colon cancer screening, patient is asymptomatic does report family history of polyps but no specific details but no history of colorectal cancer in family members, plan for colonoscopy, Clenpiq prep has been ordered  Orders:    Ambulatory Referral to Gastroenterology    Colonoscopy; Future    sodium picosulfate, magnesium oxide, citric acid (Clenpiq) oral solution; Take 175 mL (1 bottle) the evening before the colonoscopy, between 5 PM and 9 PM, followed by a second 175 mL bottle 5 hours before the colonoscopy.        History of Present Illness   Varun Lopez is a 55 y.o. male who presents for consultation to colonoscopy.  Patient has never had a colonoscopy but did have Cologuard testing back in  which was negative.  He reports no change in bowel habits. He denies any family history of colorectal cancer but does state that his family has history of polyps.  Patient denies any upper GI symptoms.  Patient has history of atrial fibrillation and they had recommended he come in for consultation, this was exacerbated by him taking a generic Synthroid and now that he has been on the brand it is actually stabilized and he just wore heart monitor and has follow-up with cardiology in early August, currently just on metoprolol, no blood thinners.      Review of Systems A complete review of systems is negative other than that noted above in the HPI.         Objective   BP (!) 153/101 (BP Location: Left arm, Patient Position: Sitting, Cuff Size: Standard)   Pulse 74   Ht 5' 7\" (1.702 m)   Wt 82.6 kg (182 lb)   BMI 28.51 kg/m²     Physical Exam   General Alert no acute distress  Heart normal S1-S2  Lungs clear to auscultation  Abdomen soft " positive bowel sounds nontender nondistended      Lab Results: I personally reviewed relevant lab results.

## 2025-06-20 NOTE — TELEPHONE ENCOUNTER
Scheduled date of colonoscopy (as of today): 08/08/25  Physician performing colonoscopy: Dr Jones  Location of colonoscopy: [facility]  Bowel prep reviewed with patient: pt already has prep instr  Instructions reviewed with patient by: IZABELLA  Clearances: n/a

## 2025-08-05 ENCOUNTER — OFFICE VISIT (OUTPATIENT)
Dept: CARDIOLOGY CLINIC | Facility: CLINIC | Age: 55
End: 2025-08-05
Payer: COMMERCIAL

## 2025-08-05 VITALS
OXYGEN SATURATION: 99 % | BODY MASS INDEX: 28.99 KG/M2 | WEIGHT: 184.7 LBS | SYSTOLIC BLOOD PRESSURE: 130 MMHG | DIASTOLIC BLOOD PRESSURE: 90 MMHG | HEIGHT: 67 IN | HEART RATE: 68 BPM

## 2025-08-05 DIAGNOSIS — R03.0 WHITE COAT SYNDROME WITHOUT DIAGNOSIS OF HYPERTENSION: ICD-10-CM

## 2025-08-05 DIAGNOSIS — F10.90 ALCOHOL USE: ICD-10-CM

## 2025-08-05 DIAGNOSIS — E78.2 MIXED HYPERLIPIDEMIA: ICD-10-CM

## 2025-08-05 DIAGNOSIS — I48.0 PAROXYSMAL ATRIAL FIBRILLATION (HCC): Primary | ICD-10-CM

## 2025-08-05 DIAGNOSIS — E89.0 POSTOPERATIVE HYPOTHYROIDISM: ICD-10-CM

## 2025-08-05 PROCEDURE — 99214 OFFICE O/P EST MOD 30 MIN: CPT | Performed by: STUDENT IN AN ORGANIZED HEALTH CARE EDUCATION/TRAINING PROGRAM

## 2025-08-08 ENCOUNTER — HOSPITAL ENCOUNTER (OUTPATIENT)
Dept: GASTROENTEROLOGY | Facility: HOSPITAL | Age: 55
Setting detail: OUTPATIENT SURGERY
End: 2025-08-08
Attending: PHYSICIAN ASSISTANT
Payer: COMMERCIAL

## 2025-08-08 ENCOUNTER — ANESTHESIA (OUTPATIENT)
Dept: GASTROENTEROLOGY | Facility: HOSPITAL | Age: 55
End: 2025-08-08
Payer: COMMERCIAL

## 2025-08-08 ENCOUNTER — ANESTHESIA EVENT (OUTPATIENT)
Dept: GASTROENTEROLOGY | Facility: HOSPITAL | Age: 55
End: 2025-08-08
Payer: COMMERCIAL

## (undated) DEVICE — PROXIMATE SKIN STAPLERS (35 WIDE) CONTAINS 35 STAINLESS STEEL STAPLES (FIXED HEAD): Brand: PROXIMATE

## (undated) DEVICE — PREP PAD BNS: Brand: CONVERTORS

## (undated) DEVICE — ACE WRAP 4 IN STERILE

## (undated) DEVICE — DRAPE SHEET THREE QUARTER

## (undated) DEVICE — GLOVE INDICATOR PI UNDERGLOVE SZ 8 BLUE

## (undated) DEVICE — ACE WRAP 6 IN STERILE

## (undated) DEVICE — INTENDED FOR TISSUE SEPARATION, AND OTHER PROCEDURES THAT REQUIRE A SHARP SURGICAL BLADE TO PUNCTURE OR CUT.: Brand: BARD-PARKER SAFETY BLADES SIZE 11, STERILE

## (undated) DEVICE — IRR SET 4M PG F/TUMESCENT

## (undated) DEVICE — ULTRASOUND GEL STERILE FOIL PK

## (undated) DEVICE — TIBURON SPLIT SHEET: Brand: CONVERTORS

## (undated) DEVICE — CHLORAPREP HI-LITE 26ML ORANGE

## (undated) DEVICE — TONGUE DEPRESSOR STERILE

## (undated) DEVICE — PROVE COVER: Brand: UNBRANDED

## (undated) DEVICE — GLOVE SRG BIOGEL 7.5

## (undated) DEVICE — BETHLEHEM UNIVERSAL MINOR GEN: Brand: CARDINAL HEALTH

## (undated) DEVICE — 2000CC GUARDIAN II: Brand: GUARDIAN

## (undated) DEVICE — SCD SEQUENTIAL COMPRESSION COMFORT SLEEVE MEDIUM KNEE LENGTH: Brand: KENDALL SCD

## (undated) DEVICE — DRAPE SHEET X-LG

## (undated) DEVICE — 3M™ STERI-STRIP™ REINFORCED ADHESIVE SKIN CLOSURES, R1547, 1/2 IN X 4 IN (12 MM X 100 MM), 6 STRIPS/ENVELOPE: Brand: 3M™ STERI-STRIP™

## (undated) DEVICE — COBAN 4 IN STERILE

## (undated) DEVICE — ALL PURPOSE SPONGES,NON-WOVEN, 4 PLY: Brand: CURITY

## (undated) DEVICE — KERLIX BANDAGE ROLL: Brand: KERLIX

## (undated) DEVICE — GAUZE SPONGES,16 PLY: Brand: CURITY